# Patient Record
Sex: FEMALE | Race: WHITE | NOT HISPANIC OR LATINO | Employment: OTHER | URBAN - NONMETROPOLITAN AREA
[De-identification: names, ages, dates, MRNs, and addresses within clinical notes are randomized per-mention and may not be internally consistent; named-entity substitution may affect disease eponyms.]

---

## 2017-04-12 ENCOUNTER — TELEPHONE (OUTPATIENT)
Dept: FAMILY MEDICINE CLINIC | Facility: CLINIC | Age: 57
End: 2017-04-12

## 2017-04-12 RX ORDER — TRAZODONE HYDROCHLORIDE 50 MG/1
50-100 TABLET ORAL NIGHTLY
Qty: 60 TABLET | Refills: 2 | Status: SHIPPED | OUTPATIENT
Start: 2017-04-12 | End: 2017-04-13 | Stop reason: SDUPTHER

## 2017-04-12 NOTE — TELEPHONE ENCOUNTER
Requested Refills Sent    ----- Message from Lana Taveras sent at 4/12/2017  9:32 AM CDT -----  Regarding: REFILL  TRISTEN PT  Contact: [phone]  [facility] called and said that patient needs refill of Trazadone 50 mg #60. Phone is [phone].

## 2017-04-13 RX ORDER — TRAZODONE HYDROCHLORIDE 50 MG/1
50-100 TABLET ORAL NIGHTLY
Qty: 60 TABLET | Refills: 2 | Status: SHIPPED | OUTPATIENT
Start: 2017-04-13 | End: 2017-07-10 | Stop reason: SDUPTHER

## 2017-05-11 ENCOUNTER — OFFICE VISIT (OUTPATIENT)
Dept: FAMILY MEDICINE CLINIC | Facility: CLINIC | Age: 57
End: 2017-05-11

## 2017-05-11 VITALS
BODY MASS INDEX: 25.23 KG/M2 | HEART RATE: 90 BPM | HEIGHT: 64 IN | DIASTOLIC BLOOD PRESSURE: 78 MMHG | OXYGEN SATURATION: 99 % | SYSTOLIC BLOOD PRESSURE: 120 MMHG | WEIGHT: 147.8 LBS

## 2017-05-11 DIAGNOSIS — Z11.59 NEED FOR HEPATITIS C SCREENING TEST: ICD-10-CM

## 2017-05-11 DIAGNOSIS — K64.8 INTERNAL HEMORRHOID: Primary | ICD-10-CM

## 2017-05-11 DIAGNOSIS — Z00.00 ANNUAL PHYSICAL EXAM: Primary | ICD-10-CM

## 2017-05-11 PROCEDURE — 99212 OFFICE O/P EST SF 10 MIN: CPT | Performed by: GENERAL PRACTICE

## 2017-05-11 RX ORDER — HYDROCORTISONE ACETATE 25 MG/1
25 SUPPOSITORY RECTAL 2 TIMES DAILY
Qty: 12 SUPPOSITORY | Refills: 1 | Status: SHIPPED | OUTPATIENT
Start: 2017-05-11 | End: 2017-07-18

## 2017-07-07 ENCOUNTER — LAB (OUTPATIENT)
Dept: LAB | Facility: HOSPITAL | Age: 57
End: 2017-07-07

## 2017-07-07 DIAGNOSIS — Z00.00 ANNUAL PHYSICAL EXAM: ICD-10-CM

## 2017-07-07 DIAGNOSIS — Z11.59 NEED FOR HEPATITIS C SCREENING TEST: ICD-10-CM

## 2017-07-07 LAB
ALBUMIN SERPL-MCNC: 4.8 G/DL (ref 3.4–4.8)
ALBUMIN/GLOB SERPL: 1.6 G/DL (ref 1.1–1.8)
ALP SERPL-CCNC: 60 U/L (ref 38–126)
ALT SERPL W P-5'-P-CCNC: 45 U/L (ref 9–52)
ANION GAP SERPL CALCULATED.3IONS-SCNC: 13 MMOL/L (ref 5–15)
ARTICHOKE IGE QN: 182 MG/DL (ref 1–129)
AST SERPL-CCNC: 25 U/L (ref 14–36)
BACTERIA UR QL AUTO: ABNORMAL /HPF
BILIRUB SERPL-MCNC: 0.7 MG/DL (ref 0.2–1.3)
BILIRUB UR QL STRIP: NEGATIVE
BUN BLD-MCNC: 20 MG/DL (ref 7–21)
BUN/CREAT SERPL: 22.2 (ref 7–25)
CALCIUM SPEC-SCNC: 9.4 MG/DL (ref 8.4–10.2)
CHLORIDE SERPL-SCNC: 104 MMOL/L (ref 95–110)
CHOLEST SERPL-MCNC: 256 MG/DL (ref 0–199)
CLARITY UR: CLEAR
CO2 SERPL-SCNC: 25 MMOL/L (ref 22–31)
COLOR UR: YELLOW
CREAT BLD-MCNC: 0.9 MG/DL (ref 0.5–1)
GFR SERPL CREATININE-BSD FRML MDRD: 65 ML/MIN/1.73 (ref 51–120)
GLOBULIN UR ELPH-MCNC: 3 GM/DL (ref 2.3–3.5)
GLUCOSE BLD-MCNC: 106 MG/DL (ref 60–100)
GLUCOSE UR STRIP-MCNC: NEGATIVE MG/DL
HCV AB SER DONR QL: NEGATIVE
HDLC SERPL-MCNC: 51 MG/DL (ref 60–200)
HGB UR QL STRIP.AUTO: ABNORMAL
HYALINE CASTS UR QL AUTO: ABNORMAL /LPF
KETONES UR QL STRIP: NEGATIVE
LDLC/HDLC SERPL: 3.61 {RATIO} (ref 0–3.22)
LEUKOCYTE ESTERASE UR QL STRIP.AUTO: NEGATIVE
NITRITE UR QL STRIP: NEGATIVE
PH UR STRIP.AUTO: 5.5 [PH] (ref 5–9)
POTASSIUM BLD-SCNC: 4.5 MMOL/L (ref 3.5–5.1)
PROT SERPL-MCNC: 7.8 G/DL (ref 6.3–8.6)
PROT UR QL STRIP: NEGATIVE
RBC # UR: ABNORMAL /HPF
REF LAB TEST METHOD: ABNORMAL
SODIUM BLD-SCNC: 142 MMOL/L (ref 137–145)
SP GR UR STRIP: 1.02 (ref 1–1.03)
SQUAMOUS #/AREA URNS HPF: ABNORMAL /HPF
TRIGL SERPL-MCNC: 104 MG/DL (ref 20–199)
UROBILINOGEN UR QL STRIP: ABNORMAL
WBC UR QL AUTO: ABNORMAL /HPF

## 2017-07-07 PROCEDURE — 80061 LIPID PANEL: CPT

## 2017-07-07 PROCEDURE — 81001 URINALYSIS AUTO W/SCOPE: CPT

## 2017-07-07 PROCEDURE — 80053 COMPREHEN METABOLIC PANEL: CPT

## 2017-07-07 PROCEDURE — 36415 COLL VENOUS BLD VENIPUNCTURE: CPT

## 2017-07-07 PROCEDURE — 86803 HEPATITIS C AB TEST: CPT

## 2017-07-10 ENCOUNTER — APPOINTMENT (OUTPATIENT)
Dept: LAB | Facility: HOSPITAL | Age: 57
End: 2017-07-10

## 2017-07-10 ENCOUNTER — OFFICE VISIT (OUTPATIENT)
Dept: FAMILY MEDICINE CLINIC | Facility: CLINIC | Age: 57
End: 2017-07-10

## 2017-07-10 ENCOUNTER — APPOINTMENT (OUTPATIENT)
Dept: MAMMOGRAPHY | Facility: CLINIC | Age: 57
End: 2017-07-10

## 2017-07-10 VITALS
WEIGHT: 149.6 LBS | OXYGEN SATURATION: 99 % | HEART RATE: 76 BPM | SYSTOLIC BLOOD PRESSURE: 118 MMHG | DIASTOLIC BLOOD PRESSURE: 60 MMHG | HEIGHT: 64 IN | BODY MASS INDEX: 25.54 KG/M2

## 2017-07-10 DIAGNOSIS — E78.2 MIXED HYPERLIPIDEMIA: ICD-10-CM

## 2017-07-10 DIAGNOSIS — N89.8 VAGINAL ITCHING: ICD-10-CM

## 2017-07-10 DIAGNOSIS — R31.9 HEMATURIA: ICD-10-CM

## 2017-07-10 DIAGNOSIS — Z12.31 ENCOUNTER FOR SCREENING MAMMOGRAM FOR MALIGNANT NEOPLASM OF BREAST: Primary | ICD-10-CM

## 2017-07-10 DIAGNOSIS — Z00.00 ANNUAL PHYSICAL EXAM: Primary | ICD-10-CM

## 2017-07-10 LAB
CANDIDA ALBICANS: NEGATIVE
GARDNERELLA VAGINALIS: POSITIVE
TRICHOMONAS VAGINALIS PCR: NEGATIVE

## 2017-07-10 PROCEDURE — 99396 PREV VISIT EST AGE 40-64: CPT | Performed by: GENERAL PRACTICE

## 2017-07-10 PROCEDURE — 77063 BREAST TOMOSYNTHESIS BI: CPT | Performed by: GENERAL PRACTICE

## 2017-07-10 PROCEDURE — 77067 SCR MAMMO BI INCL CAD: CPT | Performed by: GENERAL PRACTICE

## 2017-07-10 PROCEDURE — 87660 TRICHOMONAS VAGIN DIR PROBE: CPT | Performed by: GENERAL PRACTICE

## 2017-07-10 PROCEDURE — 87510 GARDNER VAG DNA DIR PROBE: CPT | Performed by: GENERAL PRACTICE

## 2017-07-10 PROCEDURE — 87480 CANDIDA DNA DIR PROBE: CPT | Performed by: GENERAL PRACTICE

## 2017-07-10 RX ORDER — TRAZODONE HYDROCHLORIDE 50 MG/1
50-100 TABLET ORAL NIGHTLY
Qty: 180 TABLET | Refills: 3 | Status: SHIPPED | OUTPATIENT
Start: 2017-07-10 | End: 2018-08-20 | Stop reason: SDUPTHER

## 2017-07-10 RX ORDER — LOVASTATIN 20 MG/1
20 TABLET ORAL NIGHTLY
Qty: 90 TABLET | Refills: 3 | Status: SHIPPED | OUTPATIENT
Start: 2017-07-10 | End: 2018-07-20 | Stop reason: SDUPTHER

## 2017-07-10 NOTE — PROGRESS NOTES
Subjective   Belen Larios is a 56 y.o. female.     Chief Complaint   Patient presents with   • Annual Exam     labs smamo     History of Present Illness     For annual wellness exam.  Labs reviewed. Due for mammogram. Having anal and vaginal itching.     The following portions of the patient's history were reviewed and updated as appropriate: allergies, current medications, past family and social history and problem list.    Outpatient Medications Prior to Visit   Medication Sig Dispense Refill   • acetaminophen-codeine (TYLENOL #3) 300-30 MG per tablet Take 1 tablet by mouth every 4 (four) hours as needed for moderate pain (4-6). 60 tablet 0   • Calcium Carb-Cholecalciferol (CALCIUM CARBONATE-VITAMIN D3) 600-400 MG-UNIT tablet Take 600 mg by mouth daily.     • diclofenac (VOLTAREN) 50 MG EC tablet Take 1 tablet by mouth 3 (three) times a day. 90 tablet 5   • hydrocortisone (ANUSOL-HC) 25 MG suppository Insert 1 suppository into the rectum 2 (Two) Times a Day. 12 suppository 1   • traZODone (DESYREL) 50 MG tablet Take 1-2 tablets by mouth Every Night. 60 tablet 2   • ciprofloxacin (CIPRO) 500 MG tablet Take 1 tablet by mouth now. 1 tablet 0     No facility-administered medications prior to visit.      Review of Systems   Constitutional: Negative.  Negative for activity change, appetite change, chills, fatigue, fever and unexpected weight change.   HENT: Negative.  Negative for congestion, ear pain, hearing loss, nosebleeds, rhinorrhea, sinus pressure, sneezing, sore throat, tinnitus and trouble swallowing.    Eyes: Negative.  Negative for pain, discharge, redness, itching and visual disturbance.   Respiratory: Negative.  Negative for apnea, cough, chest tightness, shortness of breath and wheezing.    Cardiovascular: Negative.  Negative for chest pain, palpitations and leg swelling.   Gastrointestinal: Negative.  Negative for abdominal distention, abdominal pain, constipation, diarrhea, nausea and vomiting.  "  Endocrine: Negative.    Genitourinary: Negative.  Negative for dysuria, frequency and urgency.   Musculoskeletal: Negative.  Negative for arthralgias, back pain, gait problem, joint swelling, myalgias, neck pain and neck stiffness.   Skin: Negative.  Negative for color change and rash.   Allergic/Immunologic: Negative.    Neurological: Negative.  Negative for dizziness, weakness, light-headedness, numbness and headaches.   Hematological: Negative.  Negative for adenopathy.   Psychiatric/Behavioral: Negative.  Negative for dysphoric mood and sleep disturbance. The patient is not nervous/anxious.      Objective     Visit Vitals   • /60   • Pulse 76   • Ht 64\" (162.6 cm)   • Wt 149 lb 9.6 oz (67.9 kg)   • SpO2 99%   • BMI 25.68 kg/m2     Physical Exam   Constitutional: She is oriented to person, place, and time. She appears well-developed and well-nourished. No distress.   HENT:   Head: Normocephalic and atraumatic.   Nose: Nose normal.   Mouth/Throat: Oropharynx is clear and moist.   Eyes: Conjunctivae and EOM are normal. Pupils are equal, round, and reactive to light. Right eye exhibits no discharge. Left eye exhibits no discharge.   Neck: No tracheal deviation present. No thyromegaly present.   Cardiovascular: Normal rate, regular rhythm, normal heart sounds and intact distal pulses.    No murmur heard.  Pulmonary/Chest: Effort normal and breath sounds normal. No respiratory distress. She has no wheezes. She has no rales. She exhibits no tenderness. Right breast exhibits no inverted nipple, no mass, no nipple discharge, no skin change and no tenderness. Left breast exhibits no inverted nipple, no mass, no nipple discharge, no skin change and no tenderness.   Abdominal: Soft. Bowel sounds are normal. She exhibits no distension and no mass. There is no tenderness. No hernia. Hernia confirmed negative in the right inguinal area and confirmed negative in the left inguinal area.   Genitourinary: Vagina normal and " uterus normal. Pelvic exam was performed with patient supine. There is no rash, tenderness or lesion on the right labia. There is no rash, tenderness or lesion on the left labia. No erythema, tenderness or bleeding in the vagina. No foreign body in the vagina. No vaginal discharge found.   Musculoskeletal: Normal range of motion. She exhibits no deformity.   Lymphadenopathy:     She has no cervical adenopathy.        Right: No inguinal adenopathy present.        Left: No inguinal adenopathy present.   Neurological: She is alert and oriented to person, place, and time. She has normal reflexes.   Skin: Skin is warm and dry.   Psychiatric: She has a normal mood and affect. Her behavior is normal. Judgment and thought content normal.   Nursing note and vitals reviewed.     Lab on 07/07/2017   Component Date Value Ref Range Status   • Hepatitis C Ab 07/07/2017 Negative  Negative Final   Lab on 07/07/2017   Component Date Value Ref Range Status   • Color, UA 07/07/2017 Yellow  Yellow, Straw, Dark Yellow, Liliana Final   • Appearance, UA 07/07/2017 Clear  Clear Final   • pH, UA 07/07/2017 5.5  5.0 - 9.0 Final   • Specific Gravity, UA 07/07/2017 1.019  1.003 - 1.030 Final   • Glucose, UA 07/07/2017 Negative  Negative Final   • Ketones, UA 07/07/2017 Negative  Negative Final   • Bilirubin, UA 07/07/2017 Negative  Negative Final   • Blood, UA 07/07/2017 Trace* Negative Final   • Protein, UA 07/07/2017 Negative  Negative Final   • Leuk Esterase, UA 07/07/2017 Negative  Negative Final   • Nitrite, UA 07/07/2017 Negative  Negative Final   • Urobilinogen, UA 07/07/2017 0.2 E.U./dL  0.2 - 1.0 E.U./dL Final   • RBC, UA 07/07/2017 13-20* None Seen /HPF Final   • WBC, UA 07/07/2017 0-2  None Seen, 0-2, 3-5 /HPF Final   • Bacteria, UA 07/07/2017 None Seen  None Seen /HPF Final   • Squamous Epithelial Cells, UA 07/07/2017 3-5* None Seen, 0-2 /HPF Final   • Hyaline Casts, UA 07/07/2017 0-2  None Seen /LPF Final   • Methodology 07/07/2017  Automated Microscopy   Final   Lab on 07/07/2017   Component Date Value Ref Range Status   • Total Cholesterol 07/07/2017 256* 0 - 199 mg/dL Final   • Triglycerides 07/07/2017 104  20 - 199 mg/dL Final   • HDL Cholesterol 07/07/2017 51* 60 - 200 mg/dL Final   • LDL Cholesterol  07/07/2017 182* 1 - 129 mg/dL Final   • LDL/HDL Ratio 07/07/2017 3.61* 0.00 - 3.22 Final   Lab on 07/07/2017   Component Date Value Ref Range Status   • Glucose 07/07/2017 106* 60 - 100 mg/dL Final   • BUN 07/07/2017 20  7 - 21 mg/dL Final   • Creatinine 07/07/2017 0.90  0.50 - 1.00 mg/dL Final   • Sodium 07/07/2017 142  137 - 145 mmol/L Final   • Potassium 07/07/2017 4.5  3.5 - 5.1 mmol/L Final   • Chloride 07/07/2017 104  95 - 110 mmol/L Final   • CO2 07/07/2017 25.0  22.0 - 31.0 mmol/L Final   • Calcium 07/07/2017 9.4  8.4 - 10.2 mg/dL Final   • Total Protein 07/07/2017 7.8  6.3 - 8.6 g/dL Final   • Albumin 07/07/2017 4.80  3.40 - 4.80 g/dL Final   • ALT (SGPT) 07/07/2017 45  9 - 52 U/L Final   • AST (SGOT) 07/07/2017 25  14 - 36 U/L Final   • Alkaline Phosphatase 07/07/2017 60  38 - 126 U/L Final   • Total Bilirubin 07/07/2017 0.7  0.2 - 1.3 mg/dL Final   • eGFR Non  Amer 07/07/2017 65  51 - 120 mL/min/1.73 Final   • Globulin 07/07/2017 3.0  2.3 - 3.5 gm/dL Final   • A/G Ratio 07/07/2017 1.6  1.1 - 1.8 g/dL Final   • BUN/Creatinine Ratio 07/07/2017 22.2  7.0 - 25.0 Final   • Anion Gap 07/07/2017 13.0  5.0 - 15.0 mmol/L Final      Assessment/Plan   Problem List Items Addressed This Visit     None      Visit Diagnoses     Annual physical exam    -  Primary    Relevant Orders    Comprehensive Metabolic Panel    Lipid Panel    Urinalysis With Microscopic    Vaginal itching        Relevant Orders    Vaginitis / Vaginosis DNA Probe (Completed)    Mixed hyperlipidemia        Relevant Medications    lovastatin (MEVACOR) 20 MG tablet    Other Relevant Orders    Comprehensive Metabolic Panel    Lipid Panel    Hematuria        Relevant Orders     Urinalysis With Microscopic        Will notify regarding results. Start lovastatin.     New Medications Ordered This Visit   Medications   • lovastatin (MEVACOR) 20 MG tablet     Sig: Take 1 tablet by mouth Every Night.     Dispense:  90 tablet     Refill:  3   • traZODone (DESYREL) 50 MG tablet     Sig: Take 1-2 tablets by mouth Every Night.     Dispense:  180 tablet     Refill:  3     Return in about 1 year (around 7/10/2018) for Annual physical.

## 2017-07-12 RX ORDER — CLINDAMYCIN PHOSPHATE 20 MG/G
1 CREAM VAGINAL NIGHTLY
Qty: 40 G | Refills: 0 | Status: SHIPPED | OUTPATIENT
Start: 2017-07-12 | End: 2017-07-18

## 2017-08-28 ENCOUNTER — LAB (OUTPATIENT)
Dept: LAB | Facility: HOSPITAL | Age: 57
End: 2017-08-28

## 2017-08-28 DIAGNOSIS — Z00.00 ANNUAL PHYSICAL EXAM: ICD-10-CM

## 2017-08-28 LAB
BACTERIA UR QL AUTO: ABNORMAL /HPF
BILIRUB UR QL STRIP: NEGATIVE
CLARITY UR: CLEAR
COLOR UR: YELLOW
GLUCOSE UR STRIP-MCNC: NEGATIVE MG/DL
HGB UR QL STRIP.AUTO: ABNORMAL
HYALINE CASTS UR QL AUTO: ABNORMAL /LPF
KETONES UR QL STRIP: NEGATIVE
LEUKOCYTE ESTERASE UR QL STRIP.AUTO: NEGATIVE
NITRITE UR QL STRIP: NEGATIVE
PH UR STRIP.AUTO: 5.5 [PH] (ref 5–9)
PROT UR QL STRIP: NEGATIVE
RBC # UR: ABNORMAL /HPF
REF LAB TEST METHOD: ABNORMAL
SP GR UR STRIP: 1.02 (ref 1–1.03)
SQUAMOUS #/AREA URNS HPF: ABNORMAL /HPF
UROBILINOGEN UR QL STRIP: ABNORMAL
WBC UR QL AUTO: ABNORMAL /HPF

## 2017-08-28 PROCEDURE — 81001 URINALYSIS AUTO W/SCOPE: CPT | Performed by: GENERAL PRACTICE

## 2017-12-06 ENCOUNTER — OFFICE VISIT (OUTPATIENT)
Dept: FAMILY MEDICINE CLINIC | Facility: CLINIC | Age: 57
End: 2017-12-06

## 2017-12-06 VITALS
BODY MASS INDEX: 25.61 KG/M2 | HEIGHT: 64 IN | WEIGHT: 150 LBS | SYSTOLIC BLOOD PRESSURE: 122 MMHG | DIASTOLIC BLOOD PRESSURE: 82 MMHG

## 2017-12-06 DIAGNOSIS — R20.2 NUMBNESS AND TINGLING: ICD-10-CM

## 2017-12-06 DIAGNOSIS — M50.30 DEGENERATIVE DISC DISEASE, CERVICAL: ICD-10-CM

## 2017-12-06 DIAGNOSIS — M54.2 CERVICAL PAIN (NECK): Primary | ICD-10-CM

## 2017-12-06 DIAGNOSIS — R20.0 NUMBNESS AND TINGLING: ICD-10-CM

## 2017-12-06 PROCEDURE — 99213 OFFICE O/P EST LOW 20 MIN: CPT | Performed by: NURSE PRACTITIONER

## 2017-12-06 RX ORDER — MELOXICAM 7.5 MG/1
TABLET ORAL
Qty: 60 TABLET | Refills: 11 | Status: SHIPPED | OUTPATIENT
Start: 2017-12-06 | End: 2018-08-20

## 2017-12-06 RX ORDER — HYDROCORTISONE ACETATE 25 MG/1
25 SUPPOSITORY RECTAL 2 TIMES DAILY
Qty: 20 SUPPOSITORY | Refills: 5 | Status: SHIPPED | OUTPATIENT
Start: 2017-12-06 | End: 2018-08-20

## 2017-12-06 RX ORDER — BACLOFEN 20 MG/1
20 TABLET ORAL 3 TIMES DAILY
Qty: 90 TABLET | Refills: 11 | Status: SHIPPED | OUTPATIENT
Start: 2017-12-06 | End: 2018-08-20

## 2017-12-06 RX ORDER — ACETAMINOPHEN AND CODEINE PHOSPHATE 300; 30 MG/1; MG/1
1 TABLET ORAL EVERY 4 HOURS PRN
Qty: 90 TABLET | Refills: 0 | Status: SHIPPED | OUTPATIENT
Start: 2017-12-06 | End: 2018-10-30

## 2017-12-06 NOTE — PROGRESS NOTES
Chief Complaint   Patient presents with   • Neck Pain     does she need to stay on the same meds   • Hemorrhoids     flare up     Subjective   Belen Larios is a 57 y.o. female.     Neck Pain    This is a recurrent problem. The current episode started more than 1 year ago. The problem occurs 2 to 4 times per day. The problem has been gradually worsening. The pain is associated with lifting a heavy object. The pain is present in the anterior neck and left side. The quality of the pain is described as cramping, shooting and stabbing. The pain is at a severity of 10/10. The pain is severe. The symptoms are aggravated by twisting. The pain is same all the time. Stiffness is present all day. Associated symptoms include headaches and numbness. Pertinent negatives include no chest pain, fever, leg pain, pain with swallowing, paresis, photophobia, syncope, tingling, trouble swallowing, visual change or weakness. She has tried acetaminophen, NSAIDs, oral narcotics, muscle relaxants, neck support, ice, chiropractic manipulation, heat and home exercises (also seen neurosurgeon in [place] and they did not suggest surgery ) for the symptoms. The treatment provided mild relief.   Hemorrhoids   Associated symptoms include arthralgias, fatigue, headaches, joint swelling, myalgias, neck pain and numbness. Pertinent negatives include no chest pain, chills, congestion, fever, nausea, rash, sore throat, visual change or weakness.        The following portions of the patient's history were reviewed and updated as appropriate: allergies, current medications, past social history and problem list.    Review of Systems   Constitutional: Positive for fatigue. Negative for activity change, appetite change, chills and fever.   HENT: Negative for congestion, sore throat and trouble swallowing.    Eyes: Negative.  Negative for photophobia.   Respiratory: Negative.    Cardiovascular: Negative for chest pain and syncope.  "  Gastrointestinal: Positive for hemorrhoids. Negative for nausea.   Endocrine: Negative.    Genitourinary: Negative.    Musculoskeletal: Positive for arthralgias, back pain, joint swelling, myalgias, neck pain and neck stiffness. Negative for gait problem.   Skin: Negative.  Negative for rash.   Allergic/Immunologic: Negative.    Neurological: Positive for numbness and headaches. Negative for dizziness, tingling, tremors, seizures, syncope, facial asymmetry, speech difficulty, weakness and light-headedness.   Hematological: Negative.    Psychiatric/Behavioral: Negative.        Objective   /82  Ht 162.6 cm (64\")  Wt 68 kg (150 lb)  BMI 25.75 kg/m2  Physical Exam   Constitutional: She is oriented to person, place, and time. She appears well-developed and well-nourished. No distress.   HENT:   Head: Normocephalic and atraumatic.   Mouth/Throat: No oropharyngeal exudate.   Eyes: EOM are normal. Pupils are equal, round, and reactive to light. Right eye exhibits no discharge. Left eye exhibits no discharge. No scleral icterus.   Neck: Normal range of motion. Neck supple. No JVD present. No tracheal deviation present. No thyromegaly present.   Cardiovascular: Normal rate, regular rhythm, normal heart sounds and normal pulses.    Pulmonary/Chest: Effort normal and breath sounds normal. No stridor. No respiratory distress. She has no wheezes. She has no rales. She exhibits no tenderness.   Abdominal: Soft. Bowel sounds are normal. She exhibits no distension and no mass. There is no tenderness. There is no rebound and no guarding. No hernia.   Genitourinary: Rectum normal.   Musculoskeletal: She exhibits tenderness.        Right shoulder: She exhibits tenderness, pain and spasm. She exhibits normal range of motion, no bony tenderness, no swelling, no effusion, no crepitus, no deformity, no laceration, normal pulse and normal strength.        Left shoulder: She exhibits tenderness, bony tenderness, crepitus, pain, " spasm and decreased strength. She exhibits normal range of motion, no swelling, no effusion, no deformity, no laceration and normal pulse.        Cervical back: She exhibits decreased range of motion, tenderness, bony tenderness, swelling, edema, pain and spasm. She exhibits no deformity, no laceration and normal pulse.        Arms:  Lymphadenopathy:     She has no cervical adenopathy.   Neurological: She is alert and oriented to person, place, and time. She has normal reflexes. She displays normal reflexes. No cranial nerve deficit. She exhibits normal muscle tone. Coordination normal.   Skin: Skin is warm and dry. No rash noted. She is not diaphoretic. No erythema. No pallor.   Psychiatric: She has a normal mood and affect.   Nursing note and vitals reviewed.    MRI Cervical Spine Without Contrast [[id number]] (Order [id number])   Status: Final result   Appointment Information   PACS Images   Radiology Images   Study Result   Patient Name-  GERMAN GUSTAFSON  Patient ID-  [id number]   Ordering-  NAYELY MAGALLON NP  Attending-  YESENIA ABBOTT MD  Referring-  NAYELY MAGALLON NP  ------------------------------------------------  Neck pain and left shoulder pain.      MR, cervical spine without intravenous contrast.      Comparison is made with MRI exam dated August 30, 2010.      Examination revealed anterior fusion of C6 and C7. The alignment of  the cervical spine is well maintained. No subluxation is seen. The  disc spaces are within normal limits. No bone marrow replacement is  identified. The predental space is unremarkable. The craniocervical  junction is normal in appearance.      At C2-C3 there is mild disc bulge/osteophyte complex without cord  effacement.      At C3-C4 no significant abnormality is seen.      At C4-C5 there is facet arthropathy on the right.      At C5-C6 there is disc bulge/osteophyte complex which touches the  cord. There are mild bilateral uncovertebral osteophytes.      At C6-C7  there is mild posterior osteophytic ridging which touches the  right hemicord.      At C7-T1 there is disc bulge/osteophyte complex which touches the  cord.      No abnormal signal is seen within the spinal cord.      CONCLUSION- Degenerative changes as described above.      Electronically Signed By- Kory Gold MD On: 2016 16:24:36   Imaging   MRI Cervical Spine Without Contrast (Order #[id number]) on 2016 - Imaging Information   Result Notes   Notes Recorded by SHARYN Perkins on       Assessment/Plan   Problem List Items Addressed This Visit        Nervous and Auditory    Cervical pain (neck) - Primary    Relevant Orders    Comprehensive Metabolic Panel    Numbness and tingling    Relevant Orders    Comprehensive Metabolic Panel       Musculoskeletal and Integument    Degenerative disc disease, cervical           New Medications Ordered This Visit   Medications   • acetaminophen-codeine (TYLENOL #3) 300-30 MG per tablet     Sig: Take 1 tablet by mouth Every 4 (Four) Hours As Needed for Moderate Pain .     Dispense:  90 tablet     Refill:  0   • meloxicam (MOBIC) 7.5 MG tablet     Si-2 tab daily     Dispense:  60 tablet     Refill:  11   • baclofen (LIORESAL) 20 MG tablet     Sig: Take 1 tablet by mouth 3 (Three) Times a Day.     Dispense:  90 tablet     Refill:  11   • hydrocortisone (ANUSOL-HC) 25 MG suppository     Sig: Insert 1 suppository into the rectum 2 (Two) Times a Day.     Dispense:  20 suppository     Refill:  5      continue chiropractor as directed once monthly, meds sparingly, monitor gi upset diet discussed, ice to area, follow up if worsen    Patient understands the risks associated with this controlled medication, including tolerance and addiction.  she also agrees to only obtain this medication from me, and not from a another provider, unless that provider is covering for me in my absence.  she also agrees to be compliant in dosing, and not self adjust the dose of  medication.  A signed controlled substance agreement is on file, and she has received a controlled substance education sheet at this a previous visit.  she has also signed a consent for treatment with a controlled substance as per [facility] policy. SHABNAM was obtained.

## 2018-05-14 RX ORDER — TRAZODONE HYDROCHLORIDE 50 MG/1
TABLET ORAL
Qty: 60 TABLET | Refills: 5 | Status: SHIPPED | OUTPATIENT
Start: 2018-05-14 | End: 2018-10-18

## 2018-07-20 RX ORDER — LOVASTATIN 20 MG/1
20 TABLET ORAL NIGHTLY
Qty: 90 TABLET | Refills: 3 | Status: SHIPPED | OUTPATIENT
Start: 2018-07-20 | End: 2019-08-07 | Stop reason: SDUPTHER

## 2018-08-14 ENCOUNTER — LAB (OUTPATIENT)
Dept: LAB | Facility: HOSPITAL | Age: 58
End: 2018-08-14

## 2018-08-14 DIAGNOSIS — E78.2 MIXED HYPERLIPIDEMIA: ICD-10-CM

## 2018-08-14 DIAGNOSIS — Z00.00 ANNUAL PHYSICAL EXAM: ICD-10-CM

## 2018-08-14 LAB
ALBUMIN SERPL-MCNC: 4.9 G/DL (ref 3.4–4.8)
ALBUMIN/GLOB SERPL: 1.7 G/DL (ref 1.1–1.8)
ALP SERPL-CCNC: 49 U/L (ref 38–126)
ALT SERPL W P-5'-P-CCNC: 31 U/L (ref 9–52)
ANION GAP SERPL CALCULATED.3IONS-SCNC: 10 MMOL/L (ref 5–15)
ARTICHOKE IGE QN: 95 MG/DL (ref 1–129)
AST SERPL-CCNC: 30 U/L (ref 14–36)
BACTERIA UR QL AUTO: ABNORMAL /HPF
BILIRUB SERPL-MCNC: 0.7 MG/DL (ref 0.2–1.3)
BILIRUB UR QL STRIP: NEGATIVE
BUN BLD-MCNC: 19 MG/DL (ref 7–21)
BUN/CREAT SERPL: 25.3 (ref 7–25)
CALCIUM SPEC-SCNC: 9.4 MG/DL (ref 8.4–10.2)
CHLORIDE SERPL-SCNC: 104 MMOL/L (ref 95–110)
CHOLEST SERPL-MCNC: 181 MG/DL (ref 0–199)
CLARITY UR: CLEAR
CO2 SERPL-SCNC: 27 MMOL/L (ref 22–31)
COLOR UR: YELLOW
CREAT BLD-MCNC: 0.75 MG/DL (ref 0.5–1)
GFR SERPL CREATININE-BSD FRML MDRD: 80 ML/MIN/1.73 (ref 51–120)
GLOBULIN UR ELPH-MCNC: 2.9 GM/DL (ref 2.3–3.5)
GLUCOSE BLD-MCNC: 119 MG/DL (ref 60–100)
GLUCOSE UR STRIP-MCNC: NEGATIVE MG/DL
HDLC SERPL-MCNC: 47 MG/DL (ref 60–200)
HGB UR QL STRIP.AUTO: ABNORMAL
HYALINE CASTS UR QL AUTO: ABNORMAL /LPF
KETONES UR QL STRIP: ABNORMAL
LDLC/HDLC SERPL: 2.27 {RATIO} (ref 0–3.22)
LEUKOCYTE ESTERASE UR QL STRIP.AUTO: ABNORMAL
NITRITE UR QL STRIP: NEGATIVE
PH UR STRIP.AUTO: 5.5 [PH] (ref 5–9)
POTASSIUM BLD-SCNC: 4.3 MMOL/L (ref 3.5–5.1)
PROT SERPL-MCNC: 7.8 G/DL (ref 6.3–8.6)
PROT UR QL STRIP: NEGATIVE
RBC # UR: ABNORMAL /HPF
REF LAB TEST METHOD: ABNORMAL
SODIUM BLD-SCNC: 141 MMOL/L (ref 137–145)
SP GR UR STRIP: 1.02 (ref 1–1.03)
SQUAMOUS #/AREA URNS HPF: ABNORMAL /HPF
TRIGL SERPL-MCNC: 137 MG/DL (ref 20–199)
UROBILINOGEN UR QL STRIP: ABNORMAL
WBC UR QL AUTO: ABNORMAL /HPF

## 2018-08-14 PROCEDURE — 80053 COMPREHEN METABOLIC PANEL: CPT

## 2018-08-14 PROCEDURE — 36415 COLL VENOUS BLD VENIPUNCTURE: CPT

## 2018-08-14 PROCEDURE — 80061 LIPID PANEL: CPT

## 2018-08-14 PROCEDURE — 81001 URINALYSIS AUTO W/SCOPE: CPT

## 2018-08-20 ENCOUNTER — OFFICE VISIT (OUTPATIENT)
Dept: FAMILY MEDICINE CLINIC | Facility: CLINIC | Age: 58
End: 2018-08-20

## 2018-08-20 VITALS
WEIGHT: 146.4 LBS | HEIGHT: 64 IN | OXYGEN SATURATION: 98 % | DIASTOLIC BLOOD PRESSURE: 84 MMHG | SYSTOLIC BLOOD PRESSURE: 130 MMHG | HEART RATE: 89 BPM | BODY MASS INDEX: 25 KG/M2

## 2018-08-20 DIAGNOSIS — Z01.419 ENCOUNTER FOR GYNECOLOGICAL EXAMINATION WITHOUT ABNORMAL FINDING: ICD-10-CM

## 2018-08-20 DIAGNOSIS — Z78.0 POST-MENOPAUSAL: Primary | ICD-10-CM

## 2018-08-20 DIAGNOSIS — Z12.31 ENCOUNTER FOR SCREENING MAMMOGRAM FOR MALIGNANT NEOPLASM OF BREAST: ICD-10-CM

## 2018-08-20 DIAGNOSIS — Z00.00 ANNUAL PHYSICAL EXAM: ICD-10-CM

## 2018-08-20 DIAGNOSIS — R73.9 HYPERGLYCEMIA: Primary | ICD-10-CM

## 2018-08-20 PROCEDURE — 99396 PREV VISIT EST AGE 40-64: CPT | Performed by: GENERAL PRACTICE

## 2018-08-20 PROCEDURE — G0123 SCREEN CERV/VAG THIN LAYER: HCPCS | Performed by: GENERAL PRACTICE

## 2018-08-20 NOTE — PROGRESS NOTES
Subjective   Belen Larios is a 57 y.o. female.     Chief Complaint   Patient presents with   • Annual Exam     labs desmond kirkpatrick       History of Present Illness     For annual wellness exam.  Labs reviewed. Due for pap, mammogram and bone density.     The following portions of the patient's history were reviewed and updated as appropriate: allergies, current medications, past family and social history and problem list.    Outpatient Medications Prior to Visit   Medication Sig Dispense Refill   • acetaminophen-codeine (TYLENOL #3) 300-30 MG per tablet Take 1 tablet by mouth Every 4 (Four) Hours As Needed for Moderate Pain . 90 tablet 0   • Calcium Carb-Cholecalciferol (CALCIUM CARBONATE-VITAMIN D3) 600-400 MG-UNIT tablet Take 600 mg by mouth daily.     • lovastatin (MEVACOR) 20 MG tablet TAKE 1 TABLET BY MOUTH EVERY NIGHT. 90 tablet 3   • traZODone (DESYREL) 50 MG tablet TAKE 1 TO 2 TABLETS BY MOUTH EVERY NIGHT. 60 tablet 5   • baclofen (LIORESAL) 20 MG tablet Take 1 tablet by mouth 3 (Three) Times a Day. 90 tablet 11   • hydrocortisone (ANUSOL-HC) 25 MG suppository Insert 1 suppository into the rectum 2 (Two) Times a Day. 20 suppository 5   • meloxicam (MOBIC) 7.5 MG tablet 1-2 tab daily 60 tablet 11   • traZODone (DESYREL) 50 MG tablet Take 1-2 tablets by mouth Every Night. 180 tablet 3     No facility-administered medications prior to visit.        Review of Systems   Constitutional: Negative.  Negative for chills, fatigue, fever and unexpected weight change.   HENT: Negative.  Negative for congestion, ear pain, hearing loss, nosebleeds, rhinorrhea, sneezing, sore throat and tinnitus.    Eyes: Negative.  Negative for discharge.   Respiratory: Negative.  Negative for cough, shortness of breath and wheezing.    Cardiovascular: Negative.  Negative for chest pain and palpitations.   Gastrointestinal: Negative.  Negative for abdominal pain, constipation, diarrhea, nausea and vomiting.   Endocrine: Negative.   "  Genitourinary: Negative.  Negative for dysuria, frequency and urgency.   Musculoskeletal: Negative.  Negative for arthralgias, back pain, joint swelling, myalgias and neck pain.   Skin: Negative.  Negative for rash.   Allergic/Immunologic: Negative.    Neurological: Negative.  Negative for dizziness, weakness, numbness and headaches.   Hematological: Negative.  Negative for adenopathy.   Psychiatric/Behavioral: Negative.  Negative for dysphoric mood and sleep disturbance. The patient is not nervous/anxious.        Objective     Visit Vitals  /84   Pulse 89   Ht 162.6 cm (64\")   Wt 66.4 kg (146 lb 6.4 oz)   SpO2 98%   BMI 25.13 kg/m²     Physical Exam   Constitutional: She is oriented to person, place, and time. She appears well-developed and well-nourished. No distress.   HENT:   Head: Normocephalic and atraumatic.   Nose: Nose normal.   Mouth/Throat: Oropharynx is clear and moist.   Eyes: Pupils are equal, round, and reactive to light. Conjunctivae and EOM are normal. Right eye exhibits no discharge. Left eye exhibits no discharge.   Neck: No tracheal deviation present. No thyromegaly present.   Cardiovascular: Normal rate, regular rhythm, normal heart sounds and intact distal pulses.    No murmur heard.  Pulmonary/Chest: Effort normal and breath sounds normal. No respiratory distress. She has no wheezes. She has no rales. She exhibits no tenderness. Right breast exhibits no inverted nipple, no mass, no nipple discharge, no skin change and no tenderness. Left breast exhibits no inverted nipple, no mass, no nipple discharge, no skin change and no tenderness.   Abdominal: Soft. Bowel sounds are normal. She exhibits no distension and no mass. There is no tenderness. No hernia. Hernia confirmed negative in the right inguinal area and confirmed negative in the left inguinal area.   Genitourinary: Vagina normal and uterus normal. Pelvic exam was performed with patient supine. There is no rash, tenderness or " lesion on the right labia. There is no rash, tenderness or lesion on the left labia. Uterus is not deviated, not enlarged, not fixed and not tender. Cervix exhibits no discharge and no friability. Right adnexum displays no mass, no tenderness and no fullness. Left adnexum displays no mass, no tenderness and no fullness. No erythema, tenderness or bleeding in the vagina. No foreign body in the vagina. No vaginal discharge found.   Genitourinary Comments: Pap done, mild vaginal atrophy   Musculoskeletal: Normal range of motion. She exhibits no deformity.   Lymphadenopathy:     She has no cervical adenopathy.        Right: No inguinal adenopathy present.        Left: No inguinal adenopathy present.   Neurological: She is alert and oriented to person, place, and time. She has normal reflexes.   Skin: Skin is warm and dry.   Psychiatric: She has a normal mood and affect. Her behavior is normal. Judgment and thought content normal.   Nursing note and vitals reviewed.    Results for orders placed or performed in visit on 08/14/18   Comprehensive Metabolic Panel   Result Value Ref Range    Glucose 119 (H) 60 - 100 mg/dL    BUN 19 7 - 21 mg/dL    Creatinine 0.75 0.50 - 1.00 mg/dL    Sodium 141 137 - 145 mmol/L    Potassium 4.3 3.5 - 5.1 mmol/L    Chloride 104 95 - 110 mmol/L    CO2 27.0 22.0 - 31.0 mmol/L    Calcium 9.4 8.4 - 10.2 mg/dL    Total Protein 7.8 6.3 - 8.6 g/dL    Albumin 4.90 (H) 3.40 - 4.80 g/dL    ALT (SGPT) 31 9 - 52 U/L    AST (SGOT) 30 14 - 36 U/L    Alkaline Phosphatase 49 38 - 126 U/L    Total Bilirubin 0.7 0.2 - 1.3 mg/dL    eGFR Non  Amer 80 51 - 120 mL/min/1.73    Globulin 2.9 2.3 - 3.5 gm/dL    A/G Ratio 1.7 1.1 - 1.8 g/dL    BUN/Creatinine Ratio 25.3 (H) 7.0 - 25.0    Anion Gap 10.0 5.0 - 15.0 mmol/L   Lipid Panel   Result Value Ref Range    Total Cholesterol 181 0 - 199 mg/dL    Triglycerides 137 20 - 199 mg/dL    HDL Cholesterol 47 (L) 60 - 200 mg/dL    LDL Cholesterol  95 1 - 129 mg/dL     LDL/HDL Ratio 2.27 0.00 - 3.22   Urinalysis without microscopic (no culture) - Urine, Clean Catch   Result Value Ref Range    Color, UA Yellow Yellow, Straw, Dark Yellow, Liliana    Appearance, UA Clear Clear    pH, UA 5.5 5.0 - 9.0    Specific Gravity, UA 1.021 1.003 - 1.030    Glucose, UA Negative Negative    Ketones, UA Trace (A) Negative    Bilirubin, UA Negative Negative    Blood, UA Small (1+) (A) Negative    Protein, UA Negative Negative    Leuk Esterase, UA Small (1+) (A) Negative    Nitrite, UA Negative Negative    Urobilinogen, UA 0.2 E.U./dL 0.2 - 1.0 E.U./dL   Urinalysis, Microscopic Only - Urine, Clean Catch   Result Value Ref Range    RBC, UA 3-5 (A) None Seen /HPF    WBC, UA 6-12 (A) None Seen, 0-2, 3-5 /HPF    Bacteria, UA 1+ (A) None Seen /HPF    Squamous Epithelial Cells, UA 13-20 (A) None Seen, 0-2 /HPF    Hyaline Casts, UA 3-6 None Seen /LPF    Methodology Automated Microscopy       Assessment/Plan   Problem List Items Addressed This Visit     None      Visit Diagnoses     Hyperglycemia    -  Primary    Relevant Orders    Ambulatory Referral to Nutrition Services    Hemoglobin A1c    Encounter for gynecological examination without abnormal finding        Relevant Orders    Liquid-based Pap Smear, Screening    Annual physical exam        Relevant Orders    Hemoglobin A1c    Comprehensive Metabolic Panel    Lipid Panel    Liquid-based Pap Smear, Screening    Encounter for screening mammogram for malignant neoplasm of breast              Will notify regarding results.     No orders of the defined types were placed in this encounter.    Return in about 1 year (around 8/20/2019) for Annual physical.

## 2018-08-21 ENCOUNTER — TELEPHONE (OUTPATIENT)
Dept: FAMILY MEDICINE CLINIC | Facility: CLINIC | Age: 58
End: 2018-08-21

## 2018-08-21 NOTE — PROGRESS NOTES
Per Dr. Lucio, Ms. Larios has been called with her recent DEXA Bone Density results & recommendations.  Continue her current medications and follow-up as planned or sooner if any problems.

## 2018-08-21 NOTE — TELEPHONE ENCOUNTER
Per Dr. Lucio, Ms. Larios has been called with her recent DEXA Bone Density results & recommendations.  Continue her current medications and follow-up as planned or sooner if any problems.      ----- Message from Cathleen Lucio MD sent at 8/21/2018 12:46 PM CDT -----  Call and tell bone density shows some bone loss but not osteoporosis, make sure she is taking Ca + D 600mg daily and exercising regularly

## 2018-08-24 ENCOUNTER — TELEPHONE (OUTPATIENT)
Dept: FAMILY MEDICINE CLINIC | Facility: CLINIC | Age: 58
End: 2018-08-24

## 2018-08-27 ENCOUNTER — HOSPITAL ENCOUNTER (OUTPATIENT)
Dept: NUTRITION | Facility: HOSPITAL | Age: 58
Discharge: HOME | End: 2018-08-27
Admitting: DIETITIAN, REGISTERED

## 2018-08-27 VITALS — WEIGHT: 146 LBS | BODY MASS INDEX: 24.92 KG/M2 | HEIGHT: 64 IN

## 2018-08-27 DIAGNOSIS — Z12.31 ENCOUNTER FOR SCREENING MAMMOGRAM FOR MALIGNANT NEOPLASM OF BREAST: Primary | ICD-10-CM

## 2018-08-27 LAB
LAB AP CASE REPORT: NORMAL
LAB AP GYN ADDITIONAL INFORMATION: NORMAL
PATH INTERP SPEC-IMP: NORMAL
STAT OF ADQ CVX/VAG CYTO-IMP: NORMAL

## 2018-08-27 PROCEDURE — 97802 MEDICAL NUTRITION INDIV IN: CPT | Performed by: DIETITIAN, REGISTERED

## 2018-08-27 NOTE — PROGRESS NOTES
"Adult Outpatient Nutrition  Assessment    Patient Name:  Belen Larios  YOB: 1960  MRN: [id number]    Assessment Date:  8/27/2018    Comments:  Pt said for three years she has been \"watching her carbs\" feels like she needs some education. Pt hasn't been eating any carbs much at all not sure what to eat. Would like to lose 10 lbs still.\" I feel like a whole world is open up to me\"-  \"looking at this menu, I don't think\"i can't do this'\"  Happy to learn she can eat some carbs.  Will return in 2 weeks for follow up. This RDN will follow then.          General Info     Row Name 08/27/18 1535       Today's Session    Person(s) attending today's session Patient                Anthropometrics     Row Name 08/27/18 1536          Anthropometrics    Height 162.6 cm (64.02\")     Weight 66.2 kg (146 lb)        Ideal Body Weight (IBW)    Ideal Body Weight (IBW) (kg) 55.04     % Ideal Body Weight 120.32        Body Mass Index (BMI)    BMI (kg/m2) 25.1        IBW Adjustment, Para/Tetraplegia    5% Adjustment, Para (IBW) 52.29     10% Adjustment, Para (IBW) 49.54     10% Adjustment, Tetra (IBW) 49.54     15% Adjustment, Tetra (IBW) 46.78             Nutritional Info/Activity     Row Name 08/27/18 1534       Nutritional Information    Have you had weight changes? Yes    Describe weight changes 15 lb lost eating very few carbs    What is your desired body weight? 61.7 kg (136 lb)    Have you tried to lose weight before? Yes    What is your motivation to lose weight? control blood sugar    Functional Status able to prepare meals    What is the biggest challenge you have with your diet? Knowledge       Eating Environment    Eating environment Family       Physical Activity    Are you currently involved in an activity/exercise program?  Yes    Describe physical activity couch to 5 k    How would you rank exercise as an important health lifestyle practice? 7            Home Nutrition Report     Row Name 08/27/18 " "1538          Home Nutrition Report    Typical Food/Fluid Intake 3 meals per day     Food Preferences omits carbs to control her blood sugar. is hungry all the time. wants to lose more weight             Estimated/Assessed Needs     Row Name 08/27/18 1540 08/27/18 1536       Calculation Measurements    Height  -- 162.6 cm (64.02\")       Estimated/Assessed Needs    Additional Documentation Calorie Requirements (Group)  --       Calorie Requirements    Estimated Calorie Requirement Comment 1500   for weight mgt  --            Evaluation of Prescribed Nutrient/Fluid Intake     Row Name 08/27/18 1536          Calculation Measurements    Height 162.6 cm (64.02\")             Labs/Tests/Procedures/Meds     Row Name 08/27/18 1540          Labs/Procedures/Meds    Lab Results Reviewed reviewed        Medications    Pertinent Medications Reviewed reviewed           Electronically signed by:  April Mcgee RD  08/27/18 3:42 PM   "

## 2018-10-15 DIAGNOSIS — M89.8X1 PAIN OF RIGHT CLAVICLE: Primary | ICD-10-CM

## 2018-10-16 ENCOUNTER — OFFICE VISIT (OUTPATIENT)
Dept: ORTHOPEDIC SURGERY | Facility: CLINIC | Age: 58
End: 2018-10-16

## 2018-10-16 VITALS — HEIGHT: 64 IN | BODY MASS INDEX: 24.41 KG/M2 | WEIGHT: 143 LBS

## 2018-10-16 DIAGNOSIS — E78.00 HYPERCHOLESTEROLEMIA: ICD-10-CM

## 2018-10-16 DIAGNOSIS — S22.41XA CLOSED FRACTURE OF MULTIPLE RIBS OF RIGHT SIDE, INITIAL ENCOUNTER: ICD-10-CM

## 2018-10-16 DIAGNOSIS — S42.021A CLOSED DISPLACED FRACTURE OF SHAFT OF RIGHT CLAVICLE, INITIAL ENCOUNTER: Primary | ICD-10-CM

## 2018-10-16 DIAGNOSIS — M89.8X1 PAIN OF RIGHT CLAVICLE: ICD-10-CM

## 2018-10-16 DIAGNOSIS — Z87.09 HISTORY OF PNEUMOTHORAX: ICD-10-CM

## 2018-10-16 PROCEDURE — 99204 OFFICE O/P NEW MOD 45 MIN: CPT | Performed by: ORTHOPAEDIC SURGERY

## 2018-10-16 RX ORDER — BUPIVACAINE HCL/0.9 % NACL/PF 0.1 %
2 PLASTIC BAG, INJECTION (ML) EPIDURAL ONCE
Status: CANCELLED | OUTPATIENT
Start: 2018-10-19 | End: 2018-10-19
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
## 2018-10-18 ENCOUNTER — APPOINTMENT (OUTPATIENT)
Dept: PREADMISSION TESTING | Facility: HOSPITAL | Age: 58
End: 2018-10-18

## 2018-10-18 ENCOUNTER — ANESTHESIA EVENT (OUTPATIENT)
Dept: PERIOP | Facility: HOSPITAL | Age: 58
End: 2018-10-18

## 2018-10-18 VITALS
HEIGHT: 64 IN | DIASTOLIC BLOOD PRESSURE: 88 MMHG | RESPIRATION RATE: 12 BRPM | HEART RATE: 78 BPM | OXYGEN SATURATION: 100 % | WEIGHT: 143 LBS | BODY MASS INDEX: 24.41 KG/M2 | SYSTOLIC BLOOD PRESSURE: 154 MMHG

## 2018-10-18 DIAGNOSIS — E78.00 HYPERCHOLESTEROLEMIA: ICD-10-CM

## 2018-10-18 DIAGNOSIS — S42.021A CLOSED DISPLACED FRACTURE OF SHAFT OF RIGHT CLAVICLE, INITIAL ENCOUNTER: ICD-10-CM

## 2018-10-18 DIAGNOSIS — Z87.09 HISTORY OF PNEUMOTHORAX: ICD-10-CM

## 2018-10-18 DIAGNOSIS — S22.41XA CLOSED FRACTURE OF MULTIPLE RIBS OF RIGHT SIDE, INITIAL ENCOUNTER: ICD-10-CM

## 2018-10-18 DIAGNOSIS — M89.8X1 PAIN OF RIGHT CLAVICLE: ICD-10-CM

## 2018-10-18 LAB — MRSA DNA SPEC QL NAA+PROBE: NEGATIVE

## 2018-10-18 PROCEDURE — 93005 ELECTROCARDIOGRAM TRACING: CPT

## 2018-10-18 PROCEDURE — 93010 ELECTROCARDIOGRAM REPORT: CPT | Performed by: INTERNAL MEDICINE

## 2018-10-18 PROCEDURE — 87641 MR-STAPH DNA AMP PROBE: CPT | Performed by: ORTHOPAEDIC SURGERY

## 2018-10-18 RX ORDER — SODIUM CHLORIDE, SODIUM GLUCONATE, SODIUM ACETATE, POTASSIUM CHLORIDE, AND MAGNESIUM CHLORIDE 526; 502; 368; 37; 30 MG/100ML; MG/100ML; MG/100ML; MG/100ML; MG/100ML
1000 INJECTION, SOLUTION INTRAVENOUS CONTINUOUS
Status: CANCELLED | OUTPATIENT
Start: 2018-10-19

## 2018-10-18 RX ORDER — TRAZODONE HYDROCHLORIDE 50 MG/1
50 TABLET ORAL NIGHTLY
COMMUNITY
End: 2019-08-22

## 2018-10-18 NOTE — DISCHARGE INSTRUCTIONS
[facility]  Pre-op Information and Guidelines    You will be called after 2 p.m. the day before your surgery (Friday for Monday surgery) and notified of your time for arrival and approximate surgery time.  If you have not received a call by 4P.M., please contact Same Day Surgery at [phone] of if outside [facility] call [phone].    Please Follow these Important Safety Guidelines:    • The morning of your procedure, take only the medications listed below with   A sip of water:_____________________________________________       ______________________________________________    • DO NOT eat or drink anything after 12:00 midnight the night before surgery  Specific instructions concerning drinking clear liquids will be discussed during  the pre-surgery instruction call the day before your surgery.    • If you take a blood thinner (ex. Plavix, Coumadin, aspirin), ask your doctor when to stop it before surgery  STOP DATE: _________________    • Only 2 visitors are allowed in patient rooms at a time  Your visitors will be asked to wait in the lobby until the admission process is complete with the exception of a parent with a child and patients in need of special assistance.    • YOU CANNOT DRIVE YOURSELF HOME  You must be accompanied by someone who will be responsible for driving you home after surgery and for your care at home.    • DO NOT chew gum, use breath mints, hard candy, or smoke the day of surgery  • DO NOT drink alcohol for at least 24 hours before your surgery  • DO NOT wear any jewelry and remove all body piercing before coming to the hospital  • DO NOT wear make-up to the hospital  • If you are having surgery on an extremity (arm/leg/foot) remove nail polish/artificial nails on the surgical side  • Clothing, glasses, contacts, dentures, and hairpieces must be removed before surgery  • Bathe the night before or the morning of your surgery and do not use powders/lotions on  skin.

## 2018-10-19 ENCOUNTER — HOSPITAL ENCOUNTER (OUTPATIENT)
Facility: HOSPITAL | Age: 58
Setting detail: HOSPITAL OUTPATIENT SURGERY
Discharge: HOME | End: 2018-10-19
Attending: ORTHOPAEDIC SURGERY | Admitting: ORTHOPAEDIC SURGERY

## 2018-10-19 ENCOUNTER — ANESTHESIA (OUTPATIENT)
Dept: PERIOP | Facility: HOSPITAL | Age: 58
End: 2018-10-19

## 2018-10-19 ENCOUNTER — APPOINTMENT (OUTPATIENT)
Dept: GENERAL RADIOLOGY | Facility: HOSPITAL | Age: 58
End: 2018-10-19

## 2018-10-19 VITALS
DIASTOLIC BLOOD PRESSURE: 82 MMHG | RESPIRATION RATE: 20 BRPM | TEMPERATURE: 98.3 F | BODY MASS INDEX: 24.28 KG/M2 | HEART RATE: 82 BPM | HEIGHT: 64 IN | SYSTOLIC BLOOD PRESSURE: 169 MMHG | WEIGHT: 142.2 LBS | OXYGEN SATURATION: 98 %

## 2018-10-19 DIAGNOSIS — M89.8X1 PAIN OF RIGHT CLAVICLE: ICD-10-CM

## 2018-10-19 DIAGNOSIS — S42.021A CLOSED DISPLACED FRACTURE OF SHAFT OF RIGHT CLAVICLE, INITIAL ENCOUNTER: ICD-10-CM

## 2018-10-19 DIAGNOSIS — E78.00 HYPERCHOLESTEROLEMIA: ICD-10-CM

## 2018-10-19 DIAGNOSIS — Z87.09 HISTORY OF PNEUMOTHORAX: ICD-10-CM

## 2018-10-19 DIAGNOSIS — S22.41XA CLOSED FRACTURE OF MULTIPLE RIBS OF RIGHT SIDE, INITIAL ENCOUNTER: ICD-10-CM

## 2018-10-19 PROCEDURE — C1713 ANCHOR/SCREW BN/BN,TIS/BN: HCPCS | Performed by: ORTHOPAEDIC SURGERY

## 2018-10-19 PROCEDURE — 76000 FLUOROSCOPY <1 HR PHYS/QHP: CPT

## 2018-10-19 PROCEDURE — 73000 X-RAY EXAM OF COLLAR BONE: CPT | Performed by: ORTHOPAEDIC SURGERY

## 2018-10-19 PROCEDURE — 25010000002 DEXAMETHASONE PER 1 MG: Performed by: NURSE ANESTHETIST, CERTIFIED REGISTERED

## 2018-10-19 PROCEDURE — 25010000002 FENTANYL CITRATE (PF) 250 MCG/5ML SOLUTION: Performed by: NURSE ANESTHETIST, CERTIFIED REGISTERED

## 2018-10-19 PROCEDURE — 25010000002 PHENYLEPHRINE PER 1 ML: Performed by: NURSE ANESTHETIST, CERTIFIED REGISTERED

## 2018-10-19 PROCEDURE — 23485 REVISION OF COLLAR BONE: CPT | Performed by: ORTHOPAEDIC SURGERY

## 2018-10-19 PROCEDURE — 25010000002 PROPOFOL 10 MG/ML EMULSION: Performed by: NURSE ANESTHETIST, CERTIFIED REGISTERED

## 2018-10-19 PROCEDURE — 25010000002 HYDROMORPHONE 1 MG/ML SOLUTION: Performed by: NURSE ANESTHETIST, CERTIFIED REGISTERED

## 2018-10-19 DEVICE — 3.5MM X 12.0MM LOCKING CORTICAL SCREW
Type: IMPLANTABLE DEVICE | Status: FUNCTIONAL
Brand: ACUMED

## 2018-10-19 DEVICE — 3.5MM X 14.0MM LOCKING CORTICAL SCREW
Type: IMPLANTABLE DEVICE | Status: FUNCTIONAL
Brand: ACUMED

## 2018-10-19 DEVICE — 3.5MM X 16.0MM CORTICAL SCREW
Type: IMPLANTABLE DEVICE | Status: FUNCTIONAL
Brand: ACUMED

## 2018-10-19 DEVICE — LOW PROF CLAVICLE PLATE, 8-HOLE STR RT
Type: IMPLANTABLE DEVICE | Status: FUNCTIONAL
Brand: ACUMED

## 2018-10-19 DEVICE — 3.5MM X 18.0MM CORTICAL SCREW
Type: IMPLANTABLE DEVICE | Status: FUNCTIONAL
Brand: ACUMED

## 2018-10-19 RX ORDER — LIDOCAINE HYDROCHLORIDE 20 MG/ML
INJECTION, SOLUTION INFILTRATION; PERINEURAL AS NEEDED
Status: DISCONTINUED | OUTPATIENT
Start: 2018-10-19 | End: 2018-10-19 | Stop reason: SURG

## 2018-10-19 RX ORDER — DEXAMETHASONE SODIUM PHOSPHATE 4 MG/ML
INJECTION, SOLUTION INTRA-ARTICULAR; INTRALESIONAL; INTRAMUSCULAR; INTRAVENOUS; SOFT TISSUE AS NEEDED
Status: DISCONTINUED | OUTPATIENT
Start: 2018-10-19 | End: 2018-10-19 | Stop reason: SURG

## 2018-10-19 RX ORDER — PROMETHAZINE HYDROCHLORIDE 25 MG/ML
12.5 INJECTION, SOLUTION INTRAMUSCULAR; INTRAVENOUS ONCE AS NEEDED
Status: DISCONTINUED | OUTPATIENT
Start: 2018-10-19 | End: 2018-10-19 | Stop reason: HOSPADM

## 2018-10-19 RX ORDER — SODIUM CHLORIDE, SODIUM GLUCONATE, SODIUM ACETATE, POTASSIUM CHLORIDE, AND MAGNESIUM CHLORIDE 526; 502; 368; 37; 30 MG/100ML; MG/100ML; MG/100ML; MG/100ML; MG/100ML
1000 INJECTION, SOLUTION INTRAVENOUS CONTINUOUS
Status: DISCONTINUED | OUTPATIENT
Start: 2018-10-19 | End: 2018-10-19 | Stop reason: HOSPADM

## 2018-10-19 RX ORDER — OXYCODONE HYDROCHLORIDE AND ACETAMINOPHEN 5; 325 MG/1; MG/1
1 TABLET ORAL ONCE AS NEEDED
Status: DISCONTINUED | OUTPATIENT
Start: 2018-10-19 | End: 2018-10-19 | Stop reason: HOSPADM

## 2018-10-19 RX ORDER — BUPIVACAINE HYDROCHLORIDE AND EPINEPHRINE 5; 5 MG/ML; UG/ML
INJECTION, SOLUTION EPIDURAL; INTRACAUDAL; PERINEURAL AS NEEDED
Status: DISCONTINUED | OUTPATIENT
Start: 2018-10-19 | End: 2018-10-19 | Stop reason: HOSPADM

## 2018-10-19 RX ORDER — ACETAMINOPHEN 650 MG/1
650 SUPPOSITORY RECTAL ONCE AS NEEDED
Status: DISCONTINUED | OUTPATIENT
Start: 2018-10-19 | End: 2018-10-19 | Stop reason: HOSPADM

## 2018-10-19 RX ORDER — ROCURONIUM BROMIDE 10 MG/ML
INJECTION, SOLUTION INTRAVENOUS AS NEEDED
Status: DISCONTINUED | OUTPATIENT
Start: 2018-10-19 | End: 2018-10-19 | Stop reason: SURG

## 2018-10-19 RX ORDER — DIPHENHYDRAMINE HYDROCHLORIDE 50 MG/ML
12.5 INJECTION INTRAMUSCULAR; INTRAVENOUS
Status: DISCONTINUED | OUTPATIENT
Start: 2018-10-19 | End: 2018-10-19 | Stop reason: HOSPADM

## 2018-10-19 RX ORDER — FENTANYL CITRATE 50 UG/ML
INJECTION, SOLUTION INTRAMUSCULAR; INTRAVENOUS AS NEEDED
Status: DISCONTINUED | OUTPATIENT
Start: 2018-10-19 | End: 2018-10-19 | Stop reason: SURG

## 2018-10-19 RX ORDER — PROPOFOL 10 MG/ML
VIAL (ML) INTRAVENOUS AS NEEDED
Status: DISCONTINUED | OUTPATIENT
Start: 2018-10-19 | End: 2018-10-19 | Stop reason: SURG

## 2018-10-19 RX ORDER — FLUMAZENIL 0.1 MG/ML
0.2 INJECTION INTRAVENOUS AS NEEDED
Status: DISCONTINUED | OUTPATIENT
Start: 2018-10-19 | End: 2018-10-19 | Stop reason: HOSPADM

## 2018-10-19 RX ORDER — ACETAMINOPHEN 325 MG/1
650 TABLET ORAL ONCE AS NEEDED
Status: DISCONTINUED | OUTPATIENT
Start: 2018-10-19 | End: 2018-10-19 | Stop reason: HOSPADM

## 2018-10-19 RX ORDER — NALOXONE HCL 0.4 MG/ML
0.2 VIAL (ML) INJECTION AS NEEDED
Status: DISCONTINUED | OUTPATIENT
Start: 2018-10-19 | End: 2018-10-19 | Stop reason: HOSPADM

## 2018-10-19 RX ORDER — LABETALOL HYDROCHLORIDE 5 MG/ML
5 INJECTION, SOLUTION INTRAVENOUS
Status: DISCONTINUED | OUTPATIENT
Start: 2018-10-19 | End: 2018-10-19 | Stop reason: HOSPADM

## 2018-10-19 RX ORDER — ONDANSETRON 2 MG/ML
4 INJECTION INTRAMUSCULAR; INTRAVENOUS ONCE AS NEEDED
Status: DISCONTINUED | OUTPATIENT
Start: 2018-10-19 | End: 2018-10-19 | Stop reason: HOSPADM

## 2018-10-19 RX ORDER — BACITRACIN 50000 [IU]/1
INJECTION, POWDER, FOR SOLUTION INTRAMUSCULAR AS NEEDED
Status: DISCONTINUED | OUTPATIENT
Start: 2018-10-19 | End: 2018-10-19 | Stop reason: HOSPADM

## 2018-10-19 RX ORDER — 0.9 % SODIUM CHLORIDE 0.9 %
VIAL (ML) INJECTION AS NEEDED
Status: DISCONTINUED | OUTPATIENT
Start: 2018-10-19 | End: 2018-10-19 | Stop reason: HOSPADM

## 2018-10-19 RX ORDER — EPHEDRINE SULFATE 50 MG/ML
5 INJECTION, SOLUTION INTRAVENOUS ONCE AS NEEDED
Status: DISCONTINUED | OUTPATIENT
Start: 2018-10-19 | End: 2018-10-19 | Stop reason: HOSPADM

## 2018-10-19 RX ORDER — MEPERIDINE HYDROCHLORIDE 50 MG/ML
12.5 INJECTION INTRAMUSCULAR; INTRAVENOUS; SUBCUTANEOUS
Status: DISCONTINUED | OUTPATIENT
Start: 2018-10-19 | End: 2018-10-19 | Stop reason: HOSPADM

## 2018-10-19 RX ORDER — BUPIVACAINE HCL/0.9 % NACL/PF 0.1 %
2 PLASTIC BAG, INJECTION (ML) EPIDURAL ONCE
Status: COMPLETED | OUTPATIENT
Start: 2018-10-19 | End: 2018-10-19

## 2018-10-19 RX ADMIN — PHENYLEPHRINE HYDROCHLORIDE 100 MCG: 10 INJECTION INTRAVENOUS at 16:20

## 2018-10-19 RX ADMIN — LIDOCAINE HYDROCHLORIDE 70 MG: 20 INJECTION, SOLUTION INFILTRATION; PERINEURAL at 16:09

## 2018-10-19 RX ADMIN — OXYCODONE HYDROCHLORIDE AND ACETAMINOPHEN 1 TABLET: 5; 325 TABLET ORAL at 19:44

## 2018-10-19 RX ADMIN — SODIUM CHLORIDE, SODIUM GLUCONATE, SODIUM ACETATE, POTASSIUM CHLORIDE, AND MAGNESIUM CHLORIDE: 526; 502; 368; 37; 30 INJECTION, SOLUTION INTRAVENOUS at 17:15

## 2018-10-19 RX ADMIN — FENTANYL CITRATE 100 MCG: 50 INJECTION, SOLUTION INTRAMUSCULAR; INTRAVENOUS at 16:09

## 2018-10-19 RX ADMIN — FENTANYL CITRATE 50 MCG: 50 INJECTION, SOLUTION INTRAMUSCULAR; INTRAVENOUS at 17:22

## 2018-10-19 RX ADMIN — ROCURONIUM BROMIDE 30 MG: 10 INJECTION INTRAVENOUS at 16:09

## 2018-10-19 RX ADMIN — PROPOFOL 150 MG: 10 INJECTION, EMULSION INTRAVENOUS at 16:09

## 2018-10-19 RX ADMIN — Medication 2 G: at 16:19

## 2018-10-19 RX ADMIN — HYDROMORPHONE HYDROCHLORIDE 0.5 MG: 1 INJECTION, SOLUTION INTRAMUSCULAR; INTRAVENOUS; SUBCUTANEOUS at 18:44

## 2018-10-19 RX ADMIN — HYDROMORPHONE HYDROCHLORIDE 0.5 MG: 1 INJECTION, SOLUTION INTRAMUSCULAR; INTRAVENOUS; SUBCUTANEOUS at 18:58

## 2018-10-19 RX ADMIN — DEXAMETHASONE SODIUM PHOSPHATE 4 MG: 4 INJECTION, SOLUTION INTRAMUSCULAR; INTRAVENOUS at 16:57

## 2018-10-19 RX ADMIN — SODIUM CHLORIDE, SODIUM GLUCONATE, SODIUM ACETATE, POTASSIUM CHLORIDE, AND MAGNESIUM CHLORIDE 1000 ML: 526; 502; 368; 37; 30 INJECTION, SOLUTION INTRAVENOUS at 13:55

## 2018-10-19 RX ADMIN — PHENYLEPHRINE HYDROCHLORIDE 100 MCG: 10 INJECTION INTRAVENOUS at 16:50

## 2018-10-19 NOTE — ANESTHESIA PREPROCEDURE EVALUATION
Anesthesia Evaluation     history of anesthetic complications: PONV  NPO Solid Status: > 8 hours  NPO Liquid Status: > 2 hours           Airway   Mallampati: II  TM distance: >3 FB  Neck ROM: full  Possible difficult intubation  Dental - normal exam     Pulmonary - normal exam    breath sounds clear to auscultation  (-) not a smoker    ROS comment: snores  Cardiovascular - normal exam  Exercise tolerance: good (4-7 METS)    ECG reviewed  Rhythm: regular  Rate: normal    (+) hyperlipidemia,   (-) angina    ROS comment: Normal sinus rhythm  Normal ECG  When compared with ECG of 18-FEB-2016 12:42,  premature ventricular complexes are no longer present  Confirmed by JOE    Neuro/Psych  (+) headaches (occ),     GI/Hepatic/Renal/Endo      Musculoskeletal     (+) neck pain,       ROS comment: Right clavicle fracture  Abdominal    Substance History   (+) alcohol use (socially),      OB/GYN negative ob/gyn ROS         Other        (-) history of cancer                  Anesthesia Plan    ASA 2     general and regional     intravenous induction   Anesthetic plan, all risks, benefits, and alternatives have been provided, discussed and informed consent has been obtained with: patient and spouse/significant other.
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
## 2018-10-19 NOTE — ANESTHESIA PROCEDURE NOTES
Airway  Urgency: elective    Airway not difficult    General Information and Staff    Patient location during procedure: OR  CRNA: RICHARD BEARDEN    Indications and Patient Condition  Indications for airway management: airway protection    Preoxygenated: yes  Mask difficulty assessment: 1 - vent by mask    Final Airway Details  Final airway type: endotracheal airway      Successful airway: ETT  Cuffed: yes   Successful intubation technique: direct laryngoscopy  Facilitating devices/methods: intubating stylet  Endotracheal tube insertion site: oral  Blade: Sumanth  Blade size: 3  ETT size: 7.5 mm  Cormack-Lehane Classification: grade I - full view of glottis  Placement verified by: chest auscultation and capnometry   Measured from: lips  ETT to lips (cm): 20  Number of attempts at approach: 1

## 2018-10-19 NOTE — ANESTHESIA PROCEDURE NOTES
Peripheral Block    Pre-sedation assessment completed: 10/19/2018 4:00 PM    Patient location during procedure: OR  Start time: 10/19/2018 4:00 PM  Stop time: 10/19/2018 4:06 PM  Reason for block: at surgeon's request and post-op pain management  Performed by  Anesthesiologist: JOJO SIMON  Assisted by: ADELSO SALEH  Preanesthetic Checklist  Completed: patient identified, site marked, surgical consent, pre-op evaluation, timeout performed, IV checked, risks and benefits discussed and monitors and equipment checked  Prep:  Pt Position: supine  Sterile barriers:cap, gloves and mask  Prep: ChloraPrep  Patient monitoring: blood pressure monitoring, continuous pulse oximetry and EKG  Procedure  Sedation:yes  Performed under: MAC  Guidance:ultrasound guided  ULTRASOUND INTERPRETATION.  Using ultrasound guidance a 22 G gauge needle was placed in close proximity to the brachial plexus nerve, at which point, under ultrasound guidance anesthetic was injected in the area of the nerve and spread of the anesthesia was seen on ultrasound in close proximity thereto.  There were no abnormalities seen on ultrasound; a digital image was taken; and the patient tolerated the procedure with no complications. Images:still images obtained    Laterality:rightInjection Technique:single-shot  Needle Type:echogenic  Needle Gauge:22 G    Medications  Comment:Superficial cervical plexus block performed under ultrasound guidance as above  Images looked good and local spread was appropriate  Local Injected:ropivacaine 0.5% Local Amount Injected:10mL  Post Assessment  Injection Assessment: negative aspiration for heme, no paresthesia on injection and incremental injection  Patient Tolerance:comfortable throughout block  Complications:no

## 2018-10-19 NOTE — OP NOTE
CLAVICLE OPEN REDUCTION INTERNAL FIXATION  Procedure Note    Name:    Belen Larios  YOB: 1960  Date of surgery:   10/19/2018    Pre-op Diagnosis:   Hypercholesterolemia [E78.00]  History of pneumothorax [Z87.09]  Closed displaced fracture of shaft of right clavicle, initial encounter [S42.021A]  Pain of right clavicle [M89.8X1]  Closed fracture of multiple ribs of right side, initial encounter [S22.41XA]    Post-op Diagnosis:    Post-Op Diagnosis Codes:     * Hypercholesterolemia [E78.00]     * History of pneumothorax [Z87.09]     * Closed displaced fracture of shaft of right clavicle, initial encounter [S42.021A]     * Pain of right clavicle [M89.8X1]     * Closed fracture of multiple ribs of right side, initial encounter [S22.41XA]    Procedure:  Procedure(s):  CLAVICLE OPEN REDUCTION INTERNAL FIXATION with iliac crest bone graft right side.         Surgeon:  Surgeon(s):  Phillip Grant MD    ASSISTANT:  Bisi Almaguer CSA    Anesthesia: General    Staff:   Circulator: Ness Amaro RN; Maile Russell RN  Radiology Technologist: Noemi Rodriguez II  Scrub Person: Liliana Crowley  Assistant: Bisi Almaguer CSA    Estimated Blood Loss: minimal    Specimens:                None      Drains:  none    Findings:  Partially healed clavicle that was grossly displaced.    Complications: None    IMPLANTS:     Implant Name Type Inv. Item Serial No.  Lot No. LRB No. Used   PLT CLAV LP STR 8H RT - [id number] Implant PLT CLAV LP STR 8H RT  ACUMED  Right 1   SCRW SOFIYA 3.5 X16MM - [id number] Implant SCRW SOFIYA 3.5 X16MM  ACUMED  Right 1   SCRW SOFIYA 3.5X18MM - [id number] Implant SCRW SOFIYA 3.5X18MM  ACUMED  Right 1   SCRW SOFIYA LK 3.5X12MM - [id number] Implant SCRW SOFIYA LK 3.5X12MM  ACUMED  Right 1   SCRW SOFIYA LK 3.5X14MM - [id number] Implant SCRW SOFIYA LK 3.5X14MM  ACUMED  Right 3         PROCEDURE:    INDICATIONS: The patient is a 58-year-old white female who suffered a clavicle  fracture with flail chest and pneumothorax approximately 6-7 weeks ago. She had initially been treated nonoperatively for her clavicle but was continuing to have severe pain and significant dysfunction in her clavicle. Her ribs had healed significantly and her pulmonary status was drastically improved. After a long discussion with the patient she wished to proceed with open reduction and internal fixation, understanding that bone grafting would most likely be necessary due to the partial healing in a grossly displaced position. The situation mimics an unstable nonunion and therefore requires longer operative time and the bone grafting for final healing.     DESCRIPTION OF PROCEDURE: Once consent was obtained the patient was taken to the operating room and once adequate anesthesia was obtained the patient was positioned on the operating table with a longitudinal-rolled sheet between her shoulder blades. The patient was placed in a modified beach chair position and all bony prominences were well-padded and the head was secured. Surgical timeout was performed. The right upper extremity and the right hip area were prepped.     An incision was then made overlying the clavicle fracture. Dissection was carried down to the fracture site. Periosteum was elevated along the medial aspect of the clavicle and then laterally as well. There was a significant amount of fibrous soft tissue material in the fracture site. This was debrided and removed with a rongeur. There was a free bony fragment that was  the medial and lateral aspects of the clavicle. This was not attached to either segment of the clavicle but there was soft tissue fibrous material that was healing around the bony fragment. Bone ends of the medial and lateral aspect of the clavicle were sclerotic in nature. The free segment that was the old butterfly fragment was dissected free and was found to be devascularized. This was saved to be used as  corticocancellous bone graft. The free ends of the clavicle were then dissected free and mobilized to allow for reduction. The sclerotic ends of the bone were debrided to allow for fresh bleeding bone edges. Once adequate mobilization was felt to be achieved then the C-arm imaging was utilized to confirm acceptable alignment radiographically as well. Due to the amount of sclerotic bone and the amount of bone loss at the fracture site it was felt that bone grafting would give the best chance for final healing and complete healing.     At this point the wound was covered and the hip area was exposed and an incision was made over the iliac crest on the right-hand side. Dissection was carried down to the crest and the periosteum was incised. The iliac crest was then exposed and a trap door was created with a 2.0 drill bit and the osteotomes to open the anterior aspect of the crest. Curettes were then used to gain cancellous bone grafting material from the inside of the iliac crest. This was kept in a cup with saline solution. Once an adequate amount of bone graft material was obtained then the wound was copiously irrigated with Bacitracin saline. The trap door was closed and the periosteum was closed with 0 Vicryl over the trap door opening in the crest. The subcutaneous tissue was then closed with 0 Vicryl and 2-0 Vicryl and then this wound was covered as well.     The attention was then returned to the clavicle. A plate was then applied onto the superior surface of the clavicle and reduction was maintained using lion jaw clamps. C-arm imaging was used to confirm acceptable position and alignment of the fracture as well as placement of the plate. The positioning was felt to be acceptable and so then a nonlocking screw was placed on either side of the fracture site in the oblong hole. Locking screws x2 were then placed on either side of the fracture site. The 2 screw holes that were directly on either side of the  fracture site were not utilized because this is where the obliquity of the fracture was and it was not felt that adequate fixation would be held in these screw holes. The final C-arm imaging confirmed acceptable position and alignment and the screw lengths were confirmed. Bone graft material was mixed with the small segment of bone that was removed and this was crunched to allow for basically a corticocancellous bone graft material. This was packed around the fracture site under the plate. Final 2-view x-ray of the clavicle confirmed acceptable position and alignment of the fracture, placement of the plate/screws, as well as screw lengths. The wound had been copiously irrigated with Bacitracin saline prior to placement of the bone graft. The periosteum was then closed using 0 Vicryl as a deep layer. The subcutaneous tissue was closed using 2-0 Vicryl and then the skin was closed using skin staples. The wound on the hip was then closed with skin staples as well. Both wounds were covered with Xeroform gauze, 4 x 4's, and Mefix dressing. The patient was then awakened and taken to the recovery room in good condition. She tolerated the procedure very well. Each wound had also been injected with 10 mL of Marcaine with epinephrine.           Phillip Grant MD     Date: 10/19/2018  Time: 6:48 PM

## 2018-10-20 NOTE — ANESTHESIA POSTPROCEDURE EVALUATION
Patient: Belen Larios    Procedure Summary     Date:  10/19/18 Room / Location:  [facility] OR  / [facility] OR    Anesthesia Start:  1553 Anesthesia Stop:  1829    Procedure:  CLAVICLE OPEN REDUCTION INTERNAL FIXATION with iliac crest bone graft right side.     (C-ARM#3) (Right Shoulder) Diagnosis:       Hypercholesterolemia      History of pneumothorax      Closed displaced fracture of shaft of right clavicle, initial encounter      Pain of right clavicle      Closed fracture of multiple ribs of right side, initial encounter      (Hypercholesterolemia [E78.00])      (History of pneumothorax [Z87.09])      (Closed displaced fracture of shaft of right clavicle, initial encounter [S42.021A])      (Pain of right clavicle [M89.8X1])      (Closed fracture of multiple ribs of right side, initial encounter [S22.41XA])    Surgeon:  Phillip Grant MD Provider:  Efren Cavazos MD    Anesthesia Type:  general, regional ASA Status:  2          Anesthesia Type: general, regional  Last vitals  BP   169/82 (10/19/18 2003)   Temp   98.3 °F (36.8 °C) (10/19/18 2003)   Pulse   82 (10/19/18 2003)   Resp   20 (10/19/18 2003)     SpO2   98 % (10/19/18 2003)     Post Anesthesia Care and Evaluation    Patient location during evaluation: PACU  Patient participation: complete - patient participated  Level of consciousness: awake and alert  Pain score: 0  Pain management: adequate  Airway patency: patent  Anesthetic complications: No anesthetic complications  PONV Status: none  Cardiovascular status: acceptable  Respiratory status: acceptable  Hydration status: acceptable

## 2018-10-20 NOTE — DISCHARGE INSTRUCTIONS
Minor Surgery  Outpatient Instructions    General Information  You have had a minor surgical procedure and are not expected to require extensive treatment or a long recovery period.  However, the following information/instructions that are listed serve as guidelines to help you recover at home.    Activity:  Wear sling at all times    Hygiene:  May shower once bandage is removed. No tub baths/submerging wounds in water.     Dressing/Wound Care:  May remove dressing in 2 days.     Diet:  As tolerated    Important Points:  -Call your doctor to report any of the following:   -unusual or excessive bleeding/drainage   -pain not reduced/controlled by medication   -elevated temperature consistently greater that 100 degrees F   -increased swelling, redness, bruising, or tenderness in surgical area  -Take medications as prescribed by your physician  -If you have any questions, please contact your doctor or the Same Day Surgery Unit at   [phone]  -If a post-operative emergency occurs, report to your nearest ER

## 2018-10-29 DIAGNOSIS — S42.021A CLOSED DISPLACED FRACTURE OF SHAFT OF RIGHT CLAVICLE, INITIAL ENCOUNTER: Primary | ICD-10-CM

## 2018-10-30 ENCOUNTER — OFFICE VISIT (OUTPATIENT)
Dept: ORTHOPEDIC SURGERY | Facility: CLINIC | Age: 58
End: 2018-10-30

## 2018-10-30 VITALS — BODY MASS INDEX: 24.59 KG/M2 | HEIGHT: 64 IN | WEIGHT: 144 LBS

## 2018-10-30 DIAGNOSIS — M89.8X1 PAIN OF RIGHT CLAVICLE: ICD-10-CM

## 2018-10-30 DIAGNOSIS — S42.021A CLOSED DISPLACED FRACTURE OF SHAFT OF RIGHT CLAVICLE, INITIAL ENCOUNTER: Primary | ICD-10-CM

## 2018-10-30 PROCEDURE — 99024 POSTOP FOLLOW-UP VISIT: CPT | Performed by: ORTHOPAEDIC SURGERY

## 2018-11-26 DIAGNOSIS — S42.021A CLOSED DISPLACED FRACTURE OF SHAFT OF RIGHT CLAVICLE, INITIAL ENCOUNTER: Primary | ICD-10-CM

## 2018-11-27 ENCOUNTER — OFFICE VISIT (OUTPATIENT)
Dept: ORTHOPEDIC SURGERY | Facility: CLINIC | Age: 58
End: 2018-11-27

## 2018-11-27 VITALS — WEIGHT: 149.2 LBS | BODY MASS INDEX: 25.47 KG/M2 | HEIGHT: 64 IN

## 2018-11-27 DIAGNOSIS — S42.021D CLOSED DISPLACED FRACTURE OF SHAFT OF RIGHT CLAVICLE WITH ROUTINE HEALING, SUBSEQUENT ENCOUNTER: Primary | ICD-10-CM

## 2018-11-27 PROCEDURE — 99024 POSTOP FOLLOW-UP VISIT: CPT | Performed by: ORTHOPAEDIC SURGERY

## 2018-11-27 RX ORDER — OXYCODONE AND ACETAMINOPHEN 7.5; 325 MG/1; MG/1
1 TABLET ORAL EVERY 6 HOURS PRN
Qty: 30 TABLET | Refills: 0 | Status: SHIPPED | OUTPATIENT
Start: 2018-11-27 | End: 2018-12-27

## 2018-11-27 NOTE — PROGRESS NOTES
"Belen Larios is a 58 y.o. female returns for     Chief Complaint   Patient presents with   • Right Clavicle - Follow-up     Xray today       HISTORY OF PRESENT ILLNESS:  Follow up Right clavicle.  ORIF 10/19/18,  Xray today.  Patient reports more pan the last week.  Pain level 6/10 today.  Having more pain over the last week but was more active.  More soreness, some aching.         CONCURRENT MEDICAL HISTORY:    The following portions of the patient's history were reviewed and updated as appropriate: allergies, current medications, past family history, past medical history, past social history, past surgical history and problem list.     ROS  No fevers or chills.  No chest pain or shortness of air.  No GI or  disturbances.    PHYSICAL EXAMINATION:       Ht 162.6 cm (64\")   Wt 67.7 kg (149 lb 3.2 oz)   LMP  (LMP Unknown)   BMI 25.61 kg/m²     Physical Exam   Constitutional: She is oriented to person, place, and time. She appears well-developed and well-nourished.   Neurological: She is alert and oriented to person, place, and time.   Psychiatric: She has a normal mood and affect. Her behavior is normal. Judgment and thought content normal.       GAIT:     [x]  Normal  []  Antalgic    Assistive device: [x]  None  []  Walker     []  Crutches  []  Cane     []  Wheelchair  []  Stretcher    Right Shoulder Exam     Tenderness   Right shoulder tenderness location: mild tenderness.    Range of Motion   Active abduction: 90   Forward flexion: 90     Muscle Strength   Abduction: 3/5   Supraspinatus: 3/5     Other   Erythema: absent  Scars: present  Sensation: normal  Pulse: present              Xr Clavicle Right    Result Date: 11/28/2018  Narrative: Ordering Provider:  Phillip Grant MD Ordering Diagnosis/Indication:  Closed displaced fracture of shaft of right clavicle, initial encounter Procedure:  XR CLAVICLE RIGHT Exam Date:  11/27/18 COMPARISON:  Todays X-rays were compared to previous images dated " October 30, 2018.     Impression:  2 views of the right clavicle show acceptable position and alignment of a midshaft clavicle fracture status post ORIF.  No sign of implant loosening or failure is noted.  Progressive healing is noted in comparison to prior x-ray.  Presence of hardware in the lower cervical spine consistent with prior cervical fusion noted on the periphery of the x-ray. Phillip Grant MD 11/27/18     Xr Clavicle Right    Result Date: 10/30/2018  Narrative: Ordering Provider:  Phillip Grant MD Ordering Diagnosis/Indication:  Closed displaced fracture of shaft of right clavicle, initial encounter Procedure:  XR CLAVICLE RIGHT Exam Date:  10/30/18 COMPARISON:  Todays X-rays were compared to previous images dated October 16, 2018.     Impression:  2 views of the right clavicle show acceptable position and alignment of a clavicle fracture status post ORIF.  No sign of implant loosening or failure is noted.  Surgical clips are noted on both views.  Interval fixation is noted in comparison to prior x-rays.  There is also noted fixation for a lower cervical spinal fusion as well with no interval change.  Bone grafting material is noted at the fracture site of the clavicle. Phillip Grant MD 10/30/18             ASSESSMENT:    Diagnoses and all orders for this visit:    Closed displaced fracture of shaft of right clavicle with routine healing, subsequent encounter  -     Ambulatory Referral to Physical Therapy Evaluate and treat    Other orders  -     oxyCODONE-acetaminophen (PERCOCET) 7.5-325 MG per tablet; Take 1 tablet by mouth Every 6 (Six) Hours As Needed for Moderate Pain .          PLAN    Slow to move.  Will continue with motion and activity   Will begin PT for ROM and begin mild strengthening.  Recheck in 4 week with repeat xrays.  Slowly progress as tolerated.    Patient's Body mass index is 25.61 kg/m². BMI is within normal parameters. No follow-up required.      Return  in about 4 weeks (around 12/25/2018) for Recheck with repeat xrays.    Phillip Grant MD

## 2018-12-04 ENCOUNTER — HOSPITAL ENCOUNTER (OUTPATIENT)
Dept: PHYSICAL THERAPY | Facility: HOSPITAL | Age: 58
Setting detail: THERAPIES SERIES
Discharge: HOME | End: 2018-12-04

## 2018-12-04 DIAGNOSIS — S42.021D CLOSED DISPLACED FRACTURE OF SHAFT OF RIGHT CLAVICLE WITH ROUTINE HEALING, SUBSEQUENT ENCOUNTER: Primary | ICD-10-CM

## 2018-12-04 PROCEDURE — 97110 THERAPEUTIC EXERCISES: CPT | Performed by: PHYSICAL THERAPIST

## 2018-12-04 PROCEDURE — 97161 PT EVAL LOW COMPLEX 20 MIN: CPT | Performed by: PHYSICAL THERAPIST
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
## 2018-12-07 ENCOUNTER — HOSPITAL ENCOUNTER (OUTPATIENT)
Dept: PHYSICAL THERAPY | Facility: HOSPITAL | Age: 58
Setting detail: THERAPIES SERIES
Discharge: HOME | End: 2018-12-07

## 2018-12-07 DIAGNOSIS — S42.021D CLOSED DISPLACED FRACTURE OF SHAFT OF RIGHT CLAVICLE WITH ROUTINE HEALING, SUBSEQUENT ENCOUNTER: Primary | ICD-10-CM

## 2018-12-07 PROCEDURE — 97110 THERAPEUTIC EXERCISES: CPT

## 2018-12-07 NOTE — THERAPY TREATMENT NOTE
Outpatient Physical Therapy Ortho Treatment Note  [facility]     Patient Name: Belen Larios  : 1960  MRN: [id number]  Today's Date: 2018      Visit Date: 2018   Attendance:   Subjective improvement:N/A  Recert: 18  MD Appointment: 18      Visit Dx:    ICD-10-CM ICD-9-CM   1. Closed displaced fracture of shaft of right clavicle with routine healing, subsequent encounter S42.021D V54.11       Patient Active Problem List   Diagnosis   • Neck pain, acute   • Skin sensation disturbance   • Cervical pain (neck)   • Numbness and tingling   • Degenerative disc disease, cervical   • Pain of right clavicle   • Hypercholesterolemia   • History of pneumothorax   • Closed displaced fracture of shaft of right clavicle   • Multiple closed fractures of ribs of right side        Past Medical History:   Diagnosis Date   • Acute upper respiratory infection    • Allergic rhinitis    • Backache     Backache - possible IT band      • Cough    • Encounter for general adult medical examination without abnormal findings    • Fibrocystic breast    • Health maintenance examination     Adult health examination      • History of screening mammography     Screening mammography      • Hyperlipidemia    • Hyperthyroidism     Hyperthyroidism - hx of same     • Pharyngitis    • Rhinitis    • Sacroiliitis (CMS/HCC)     Sacroiliitis, not elsewhere classified      • Skin lesion     Skin lesion - lesion right cheek      • Upper respiratory infection    • Visit for gynecologic examination         Past Surgical History:   Procedure Laterality Date   • CERVICAL SPINE SURGERY  10/28/2010    Cervical spine disk surgery (1)   • COLONOSCOPY  10/27/2011    Colonoscopy, diagnostic (screening) 47360 (1)      • ENDOSCOPY  2008    EGD w/ tube 13694 (1)    Hypopharynx, esophagus,NRL. 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 Multiple cold biop was obtained from the antrum. A single cold biop was obtained & placed on  PyloriTek strip. Pylorus, duodenum NRL.    • INJECTION OF MEDICATION  12/08/2015    Kenalog (4)      • INJECTION OF MEDICATION  01/17/2015    Rocephin (2)      • INJECTION OF MEDICATION  01/17/2015    Solu-Medrol (1)     • PAP SMEAR  06/22/2015    WNL, CARD SENT, DR. RAMON'S OFFICE   • PROCEDURE GENERIC CONVERTED  02/18/2016    Consult, Dermatology (1)          PT Ortho     Row Name 12/07/18 1100       Precautions and Contraindications    Precautions  C6-7 fusion  -EM       Posture/Observations    Posture/Observations Comments  No apparent distress  -EM      User Key  (r) = Recorded By, (t) = Taken By, (c) = Cosigned By    Initials Name Provider Type    Ramos Huertas, PTA Physical Therapy Assistant                      PT Assessment/Plan     Row Name 12/07/18 1100          PT Assessment    Functional Limitations  Limitations in functional capacity and performance;Performance in self-care ADL;Limitation in home management;Performance in work activities  -EM     Impairments  Range of motion;Pain;Muscle strength  -EM     Assessment Comments  Pt deja tx well with new therex. Tx limited due to a Code Red at facility.   -EM     Rehab Potential  Good  -EM     Patient/caregiver participated in establishment of treatment plan and goals  Yes  -EM     Patient would benefit from skilled therapy intervention  Yes  -EM        PT Plan    PT Frequency  2x/week  -EM     Predicted Duration of Therapy Intervention (Therapy Eval)  6wks  -EM     PT Plan Comments  Update HEP issue shoulder pulley for HEP. Cont to gently progress as deja  -EM       User Key  (r) = Recorded By, (t) = Taken By, (c) = Cosigned By    Initials Name Provider Type    Ramos Huertas PTA Physical Therapy Assistant              Exercises     Row Name 12/07/18 1100             Subjective Comments    Subjective Comments  Pt states she is doing well, has most of her difficulty with abd. Pt reports full compliance with current HEP.   -EM         Exercise 1     Exercise Name 1  PRO II-AAROM UE  -EM      Time 1  10'  -EM         Exercise 2    Exercise Name 2  3 Way Shoulder Pulley  -EM      Sets 2  1  -EM      Reps 2  20  -EM         Exercise 3    Exercise Name 3  AAROM w/ Cane: Flex, Abd  -EM      Sets 3  1  -EM      Reps 3  20  -EM         Exercise 4    Exercise Name 4  Tx terminated due to Code Red at facility  -EM        User Key  (r) = Recorded By, (t) = Taken By, (c) = Cosigned By    Initials Name Provider Type    EM Ramos Lake, PTA Physical Therapy Assistant                         PT OP Goals     Row Name 12/07/18 1100          PT Short Term Goals    STG Date to Achieve  12/25/18  -EM     STG 1  Patient will be independent in home exercise program  -EM     STG 1 Progress  Met  (Significant)   -EM     STG 2  Patient will have active shoulder flexion 140°  -EM     STG 2 Progress  Not Met  -EM     STG 3  Patient will have active shoulder abduction 110°  -EM     STG 3 Progress  Not Met  -EM     STG 4  Patient will have active external rotation to 70° at 90° abduction  -EM     STG 4 Progress  Not Met  -EM     STG 5   Patient will have internal rotation to 60° at 90° abduction  -EM     STG 5 Progress  Not Met  -EM     STG 6  Patient will have quick Dash score of 36% or less  -EM     STG 6 Progress  Not Met  -EM        Long Term Goals    LTG Date to Achieve  01/04/19  -EM     LTG 1  Patient will be able to report subjectively 75-80% overall improvement  -EM     LTG 1 Progress  Not Met  -EM     LTG 2  Patient be independent in final home exercise program  -EM     LTG 2 Progress  Not Met  -EM     LTG 3  Patient will have active shoulder flexion 160°  -EM     LTG 3 Progress  Not Met  -EM     LTG 4  Patient will have active shoulder abduction to 150°  -EM     LTG 4 Progress  Not Met  -EM     LTG 5  Patient will have quick Dash score of less than 20% deficit  -EM     LTG 5 Progress  Not Met  -EM     LTG 6  Patient will have met Patient goals of returning to work and put  hair up in a ponytail  -EM     LTG 6 Progress  Not Met  -EM        Time Calculation    PT Goal Re-Cert Due Date  12/25/18  -EM       User Key  (r) = Recorded By, (t) = Taken By, (c) = Cosigned By    Initials Name Provider Type    EM Ramos Lake, SHELLEY Physical Therapy Assistant                         Time Calculation:   Start Time: 1004  Stop Time: 1044  Time Calculation (min): 40 min  Total Timed Code Minutes- PT: 40 minute(s)  Therapy Suggested Charges     Code   Minutes Charges    None           Therapy Charges for Today     Code Description Service Date Service Provider Modifiers Qty    84694755484 HC PT THER PROC EA 15 MIN 12/7/2018 Ramos Lake PTA GP 3                    Ramos Lake PTA  12/7/2018

## 2018-12-10 ENCOUNTER — HOSPITAL ENCOUNTER (OUTPATIENT)
Dept: PHYSICAL THERAPY | Facility: HOSPITAL | Age: 58
Setting detail: THERAPIES SERIES
Discharge: HOME | End: 2018-12-10

## 2018-12-10 DIAGNOSIS — S42.021D CLOSED DISPLACED FRACTURE OF SHAFT OF RIGHT CLAVICLE WITH ROUTINE HEALING, SUBSEQUENT ENCOUNTER: Primary | ICD-10-CM

## 2018-12-10 PROCEDURE — G0283 ELEC STIM OTHER THAN WOUND: HCPCS

## 2018-12-10 PROCEDURE — 97110 THERAPEUTIC EXERCISES: CPT

## 2018-12-10 NOTE — THERAPY TREATMENT NOTE
Outpatient Physical Therapy Ortho Treatment Note  [facility]     Patient Name: Belen Larios  : 1960  MRN: [id number]  Today's Date: 12/10/2018      Visit Date: 12/10/2018   Attendance: 3/3  Subjective improvement: N/A  Recert: 18  MD Appointment: 18      Visit Dx:    ICD-10-CM ICD-9-CM   1. Closed displaced fracture of shaft of right clavicle with routine healing, subsequent encounter S42.021D V54.11       Patient Active Problem List   Diagnosis   • Neck pain, acute   • Skin sensation disturbance   • Cervical pain (neck)   • Numbness and tingling   • Degenerative disc disease, cervical   • Pain of right clavicle   • Hypercholesterolemia   • History of pneumothorax   • Closed displaced fracture of shaft of right clavicle   • Multiple closed fractures of ribs of right side        Past Medical History:   Diagnosis Date   • Acute upper respiratory infection    • Allergic rhinitis    • Backache     Backache - possible IT band      • Cough    • Encounter for general adult medical examination without abnormal findings    • Fibrocystic breast    • Health maintenance examination     Adult health examination      • History of screening mammography     Screening mammography      • Hyperlipidemia    • Hyperthyroidism     Hyperthyroidism - hx of same     • Pharyngitis    • Rhinitis    • Sacroiliitis (CMS/HCC)     Sacroiliitis, not elsewhere classified      • Skin lesion     Skin lesion - lesion right cheek      • Upper respiratory infection    • Visit for gynecologic examination         Past Surgical History:   Procedure Laterality Date   • CERVICAL SPINE SURGERY  10/28/2010    Cervical spine disk surgery (1)   • COLONOSCOPY  10/27/2011    Colonoscopy, diagnostic (screening) 12460 (1)      • ENDOSCOPY  2008    EGD w/ tube 53666 (1)    Hypopharynx, esophagus,NRL. 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 Multiple cold biop was obtained from the antrum. A single cold biop was obtained & placed  on PyloriTek strip. Pylorus, duodenum NRL.    • INJECTION OF MEDICATION  12/08/2015    Kenalog (4)      • INJECTION OF MEDICATION  01/17/2015    Rocephin (2)      • INJECTION OF MEDICATION  01/17/2015    Solu-Medrol (1)     • PAP SMEAR  06/22/2015    WNL, CARD SENT, DR. RAMON'S OFFICE   • PROCEDURE GENERIC CONVERTED  02/18/2016    Consult, Dermatology (1)          PT Ortho     Row Name 12/10/18 1100       Precautions and Contraindications    Precautions  C6-7 fusion  -EM       Posture/Observations    Posture/Observations Comments  No acute distress w/   -EM       Right Upper Ext    Rt Shoulder Abduction PROM  125  -EM    Rt Shoulder Flexion PROM  143  -EM    Rt Shoulder External Rotation PROM  45  -EM    Rt Shoulder Internal Rotation PROM  53  -EM      User Key  (r) = Recorded By, (t) = Taken By, (c) = Cosigned By    Initials Name Provider Type    EM Ramos Lake, PTA Physical Therapy Assistant                      PT Assessment/Plan     Row Name 12/10/18 1100          PT Assessment    Functional Limitations  Limitations in functional capacity and performance;Performance in self-care ADL;Limitation in home management;Performance in work activities  -EM     Impairments  Range of motion;Pain;Muscle strength  -EM     Assessment Comments  Pt deja tx well with good improvements noted with flexion, Abd, and ER.  -EM     Rehab Potential  Good  -EM     Patient/caregiver participated in establishment of treatment plan and goals  Yes  -EM     Patient would benefit from skilled therapy intervention  Yes  -EM        PT Plan    PT Frequency  2x/week  -EM     Predicted Duration of Therapy Intervention (Therapy Eval)  6 wks  -EM     PT Plan Comments  Add Wall slides next tx. Reduce Scap Retraction without TB due to sight increaes in pain afterwards. Cont to gently progress as deja. Progress to supine AROM, AAROM against gravity.   -EM       User Key  (r) = Recorded By, (t) = Taken By, (c) = Cosigned By    Initials Name Provider  Type    EM Ramos Lake, SHELLEY Physical Therapy Assistant          Modalities     Row Name 12/10/18 1100             Subjective Pain    Able to rate subjective pain?  yes  -EM      Pre-Treatment Pain Level  4  -EM      Post-Treatment Pain Level  3  -EM         ELECTRICAL STIMULATION    Attended/Unattended  Unattended  -EM      Stimulation Type  IFC w/ ice  -EM      Max mAmp  6  -EM      Location/Electrode Placement/Other  R shoulder 10'  -EM        User Key  (r) = Recorded By, (t) = Taken By, (c) = Cosigned By    Initials Name Provider Type    EM Ramos Lake PTA Physical Therapy Assistant          Exercises     Row Name 12/10/18 1100             Subjective Comments    Subjective Comments   Pt reports no new changes since last tx.   -EM         Subjective Pain    Able to rate subjective pain?  yes  -EM      Pre-Treatment Pain Level  4  -EM      Post-Treatment Pain Level  3  -EM         Exercise 1    Exercise Name 1  PRO II-AAROM UE/LE  -EM      Time 1  10'  -EM         Exercise 2    Exercise Name 2  3 Way Shoulder Pulley  -EM      Sets 2  1  -EM      Reps 2  30  -EM         Exercise 3    Exercise Name 3  Scap Retraction w/ TB  -EM      Sets 3  1  -EM      Reps 3  20  -EM      Additional Comments  Yellow-Slight increase in pain noted afterward  -EM         Exercise 4    Exercise Name 4  Lat Pulls w/ TB  -EM      Sets 4  1  -EM      Reps 4  20  -EM      Additional Comments  Yellow  -EM         Exercise 5    Exercise Name 5  Shoulder Shruggs  -EM      Sets 5  1  -EM      Reps 5  20  -EM         Exercise 6    Exercise Name 6  AAROM w/ Cane: Flex, Abd  -EM      Sets 6  1  -EM      Reps 6  30  -EM         Exercise 7    Exercise Name 7  Sup Shoulder Pendulum: CW, CCW, Up/down, Side/side  -EM      Sets 7  1  -EM      Reps 7  20  -EM         Exercise 8    Exercise Name 8  Sup Serratus Punch   -EM      Sets 8  1  -EM      Reps 8  20  -EM         Exercise 9    Exercise Name 9  PROM  -EM      Time 9  16'  -EM          Exercise 10    Exercise Name 10  IFC w/ ice  -EM      Time 10  10'  -EM        User Key  (r) = Recorded By, (t) = Taken By, (c) = Cosigned By    Initials Name Provider Type    KEEGAN Ramos Lake, SHELLEY Physical Therapy Assistant                        Manual Rx (last 36 hours)      Manual Treatments     Row Name 12/10/18 1100             Manual Rx 1    Manual Rx 1 Location  R shoulder   -EM      Manual Rx 1 Type  PROM: Flex, Abd, IR, ER  -EM      Manual Rx 1 Duration  16'  -EM        User Key  (r) = Recorded By, (t) = Taken By, (c) = Cosigned By    Initials Name Provider Type    KEEGAN LakeRamos, SHELLEY Physical Therapy Assistant          PT OP Goals     Row Name 12/10/18 1100          PT Short Term Goals    STG Date to Achieve  12/25/18  -EM     STG 1  Patient will be independent in home exercise program  -EM     STG 1 Progress  Met  (Significant)   -EM     STG 2  Patient will have active shoulder flexion 140°  -EM     STG 2 Progress  Not Met  -EM     STG 3  Patient will have active shoulder abduction 110°  -EM     STG 3 Progress  Not Met  -EM     STG 4  Patient will have active external rotation to 70° at 90° abduction  -EM     STG 4 Progress  Not Met  -EM     STG 5   Patient will have internal rotation to 60° at 90° abduction  -EM     STG 5 Progress  Not Met  -EM     STG 6  Patient will have quick Dash score of 36% or less  -EM     STG 6 Progress  Not Met  -EM        Long Term Goals    LTG Date to Achieve  01/04/19  -EM     LTG 1  Patient will be able to report subjectively 75-80% overall improvement  -EM     LTG 1 Progress  Not Met  -EM     LTG 2  Patient be independent in final home exercise program  -EM     LTG 2 Progress  Not Met  -EM     LTG 3  Patient will have active shoulder flexion 160°  -EM     LTG 3 Progress  Not Met  -EM     LTG 4  Patient will have active shoulder abduction to 150°  -EM     LTG 4 Progress  Not Met  -EM     LTG 5  Patient will have quick Dash score of less than 20% deficit  -EM     LTG 5  Progress  Not Met  -EM     LTG 6  Patient will have met Patient goals of returning to work and put hair up in a ponytail  -EM     LTG 6 Progress  Not Met  -EM        Time Calculation    PT Goal Re-Cert Due Date  12/25/18  -EM       User Key  (r) = Recorded By, (t) = Taken By, (c) = Cosigned By    Initials Name Provider Type    EM Ramos Lake, SHELLEY Physical Therapy Assistant          Therapy Education  Education Details: Updated HEP issued with pulley system: AAROM w/ cane: flex, abd, IR, ER, Ext, Scap Retraction  Given: HEP  Program: Progressed  How Provided: Verbal, Demonstration, Written  Provided to: Patient  Level of Understanding: Verbalized, Demonstrated              Time Calculation:   Start Time: 1113  Stop Time: 1225  Time Calculation (min): 72 min  Total Timed Code Minutes- PT: 72 minute(s)  Therapy Suggested Charges     Code   Minutes Charges    None           Therapy Charges for Today     Code Description Service Date Service Provider Modifiers Qty    02695538550 HC PT THER PROC EA 15 MIN 12/10/2018 Ramos Lake, PTA GP 4    69946995077 HC PT ELECTRICAL STIM UNATTENDED 12/10/2018 Ramos Lake, PTA  1                    Ramos Lake PTA  12/10/2018

## 2018-12-12 ENCOUNTER — HOSPITAL ENCOUNTER (OUTPATIENT)
Dept: PHYSICAL THERAPY | Facility: HOSPITAL | Age: 58
Setting detail: THERAPIES SERIES
Discharge: HOME | End: 2018-12-12

## 2018-12-12 DIAGNOSIS — S42.021D CLOSED DISPLACED FRACTURE OF SHAFT OF RIGHT CLAVICLE WITH ROUTINE HEALING, SUBSEQUENT ENCOUNTER: Primary | ICD-10-CM

## 2018-12-12 PROCEDURE — 97110 THERAPEUTIC EXERCISES: CPT

## 2018-12-12 PROCEDURE — G0283 ELEC STIM OTHER THAN WOUND: HCPCS

## 2018-12-12 NOTE — THERAPY TREATMENT NOTE
Outpatient Physical Therapy Ortho Treatment Note  [facility]     Patient Name: Belen Larios  : 1960  MRN: [id number]  Today's Date: 2018      Visit Date: 2018   Attendance:   Subjective improvement: N/A  Recert: 18  MD Appointment: 18        Visit Dx:    ICD-10-CM ICD-9-CM   1. Closed displaced fracture of shaft of right clavicle with routine healing, subsequent encounter S42.021D V54.11       Patient Active Problem List   Diagnosis   • Neck pain, acute   • Skin sensation disturbance   • Cervical pain (neck)   • Numbness and tingling   • Degenerative disc disease, cervical   • Pain of right clavicle   • Hypercholesterolemia   • History of pneumothorax   • Closed displaced fracture of shaft of right clavicle   • Multiple closed fractures of ribs of right side        Past Medical History:   Diagnosis Date   • Acute upper respiratory infection    • Allergic rhinitis    • Backache     Backache - possible IT band      • Cough    • Encounter for general adult medical examination without abnormal findings    • Fibrocystic breast    • Health maintenance examination     Adult health examination      • History of screening mammography     Screening mammography      • Hyperlipidemia    • Hyperthyroidism     Hyperthyroidism - hx of same     • Pharyngitis    • Rhinitis    • Sacroiliitis (CMS/HCC)     Sacroiliitis, not elsewhere classified      • Skin lesion     Skin lesion - lesion right cheek      • Upper respiratory infection    • Visit for gynecologic examination         Past Surgical History:   Procedure Laterality Date   • CERVICAL SPINE SURGERY  10/28/2010    Cervical spine disk surgery (1)   • COLONOSCOPY  10/27/2011    Colonoscopy, diagnostic (screening) 50754 (1)      • ENDOSCOPY  2008    EGD w/ tube 29189 (1)    Hypopharynx, esophagus,NRL. 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 Multiple cold biop was obtained from the antrum. A single cold biop was obtained & placed  on PyloriTek strip. Pylorus, duodenum NRL.    • INJECTION OF MEDICATION  12/08/2015    Kenalog (4)      • INJECTION OF MEDICATION  01/17/2015    Rocephin (2)      • INJECTION OF MEDICATION  01/17/2015    Solu-Medrol (1)     • PAP SMEAR  06/22/2015    WNL, CARD SENT, DR. RAMON'S OFFICE   • PROCEDURE GENERIC CONVERTED  02/18/2016    Consult, Dermatology (1)          PT Ortho     Row Name 12/12/18 1000       Precautions and Contraindications    Precautions  C6-7 fusion  -EM       Posture/Observations    Posture/Observations Comments  Very little guarding noted with PROM.   -EM       Right Upper Ext    Rt Shoulder Abduction PROM  130  -EM    Rt Shoulder Flexion PROM  150  -EM    Rt Shoulder External Rotation PROM  45  -EM    Rt Shoulder Internal Rotation PROM  to belly  -EM    Row Name 12/10/18 1100       Precautions and Contraindications    Precautions  C6-7 fusion  -EM       Posture/Observations    Posture/Observations Comments  No acute distress w/   -EM       Right Upper Ext    Rt Shoulder Abduction PROM  125  -EM    Rt Shoulder Flexion PROM  143  -EM    Rt Shoulder External Rotation PROM  45  -EM    Rt Shoulder Internal Rotation PROM  53  -EM      User Key  (r) = Recorded By, (t) = Taken By, (c) = Cosigned By    Initials Name Provider Type    EM Ramos Lake, PTA Physical Therapy Assistant                      PT Assessment/Plan     Row Name 12/12/18 1000          PT Assessment    Functional Limitations  Limitations in functional capacity and performance;Performance in self-care ADL;Limitation in home management;Performance in work activities  -EM     Impairments  Range of motion;Pain;Muscle strength  -EM     Assessment Comments  Pt deja tx well with initiation of AROM in a gravity minimized motion. Pt cont to make good gains with PROM measurements this tx.   -EM     Rehab Potential  Good  -EM     Patient/caregiver participated in establishment of treatment plan and goals  Yes  -EM     Patient would benefit from  skilled therapy intervention  Yes  -EM        PT Plan    PT Frequency  2x/week  -EM     Predicted Duration of Therapy Intervention (Therapy Eval)  6 weeks  -EM     PT Plan Comments  Sup AROM measurements next tx.   -EM       User Key  (r) = Recorded By, (t) = Taken By, (c) = Cosigned By    Initials Name Provider Type    Ramos Huertas, SHELLEY Physical Therapy Assistant          Modalities     Row Name 12/12/18 1000             Subjective Pain    Post-Treatment Pain Level  4  -EM         ELECTRICAL STIMULATION    Attended/Unattended  Unattended  -EM      Stimulation Type  IFC w/ ice  -EM      Max mAmp  5  -EM      Location/Electrode Placement/Other  R shoulder 15'  -EM        User Key  (r) = Recorded By, (t) = Taken By, (c) = Cosigned By    Initials Name Provider Type    Ramos Huertas PTA Physical Therapy Assistant          Exercises     Row Name 12/12/18 1000             Subjective Comments    Subjective Comments  Pt reports some soreness after last tx.   -EM         Subjective Pain    Able to rate subjective pain?  yes  -EM      Pre-Treatment Pain Level  4  -EM      Post-Treatment Pain Level  4  -EM         Exercise 1    Exercise Name 1  PRO II-AAROM UE/LE  -EM      Time 1  10'  -EM         Exercise 2    Exercise Name 2  3 Way Shoulder Pulley  -EM      Sets 2  1  -EM      Reps 2  30  -EM         Exercise 3    Exercise Name 3  Scap Retraction   -EM      Sets 3  1  -EM      Reps 3  30  -EM         Exercise 4    Exercise Name 4  Shoulder Shruggs/Rolls  -EM      Sets 4  1  -EM      Reps 4  30  -EM         Exercise 5    Exercise Name 5  AAROM w/ Cane: Flex, Abd, IR, ER, Ext  -EM      Sets 5  1  -EM      Reps 5  30  -EM         Exercise 6    Exercise Name 6  Sup AROM: Flex,   -EM      Sets 6  1  -EM      Reps 6  30  -EM         Exercise 7    Exercise Name 7  SL AROM: Abd,ER  -EM      Sets 7  1  -EM      Reps 7  30  -EM         Exercise 8    Exercise Name 8  Sup Shoulder Pendulums: CW, CCW, up/down, side/side  -EM       Sets 8  1  -EM      Reps 8  30  -EM         Exercise 9    Exercise Name 9  Sup Serratus Punches  -EM      Sets 9  1  -EM      Reps 9  30  -EM         Exercise 10    Exercise Name 10  PROM:   -EM      Time 10  10'  -EM         Exercise 11    Exercise Name 11  IFC w/ ice  -EM      Time 11  15'  -EM        User Key  (r) = Recorded By, (t) = Taken By, (c) = Cosigned By    Initials Name Provider Type    EM Ramos Lake, PTA Physical Therapy Assistant                         PT OP Goals     Row Name 12/12/18 1000          PT Short Term Goals    STG Date to Achieve  12/25/18  -EM     STG 1  Patient will be independent in home exercise program  -EM     STG 1 Progress  Met  (Significant)   -EM     STG 2  Patient will have active shoulder flexion 140°  -EM     STG 2 Progress  Not Met  -EM     STG 3  Patient will have active shoulder abduction 110°  -EM     STG 3 Progress  Not Met  -EM     STG 4  Patient will have active external rotation to 70° at 90° abduction  -EM     STG 4 Progress  Not Met  -EM     STG 5   Patient will have internal rotation to 60° at 90° abduction  -EM     STG 5 Progress  Not Met  -EM     STG 6  Patient will have quick Dash score of 36% or less  -EM     STG 6 Progress  Not Met  -EM        Long Term Goals    LTG Date to Achieve  01/04/19  -EM     LTG 1  Patient will be able to report subjectively 75-80% overall improvement  -EM     LTG 1 Progress  Not Met  -EM     LTG 2  Patient be independent in final home exercise program  -EM     LTG 2 Progress  Not Met  -EM     LTG 3  Patient will have active shoulder flexion 160°  -EM     LTG 3 Progress  Not Met  -EM     LTG 4  Patient will have active shoulder abduction to 150°  -EM     LTG 4 Progress  Not Met  -EM     LTG 5  Patient will have quick Dash score of less than 20% deficit  -EM     LTG 5 Progress  Not Met  -EM     LTG 6  Patient will have met Patient goals of returning to work and put hair up in a ponytail  -EM     LTG 6 Progress  Not Met  -EM         Time Calculation    PT Goal Re-Cert Due Date  12/25/18  -EM       User Key  (r) = Recorded By, (t) = Taken By, (c) = Cosigned By    Initials Name Provider Type    EM Ramos Lake PTA Physical Therapy Assistant                         Time Calculation:   Start Time: 1003  Stop Time: 1122  Time Calculation (min): 79 min  Total Timed Code Minutes- PT: 79 minute(s)  Therapy Suggested Charges     Code   Minutes Charges    None           Therapy Charges for Today     Code Description Service Date Service Provider Modifiers Qty    13312249154 HC PT THER PROC EA 15 MIN 12/12/2018 Ramos Lake PTA GP 4    18018817393 HC PT ELECTRICAL STIM UNATTENDED 12/12/2018 Ramos Lake PTA  1            Shoulder pulley issued with charges.      Ramos Lake PTA  12/12/2018

## 2018-12-18 ENCOUNTER — HOSPITAL ENCOUNTER (OUTPATIENT)
Dept: PHYSICAL THERAPY | Facility: HOSPITAL | Age: 58
Setting detail: THERAPIES SERIES
Discharge: HOME | End: 2018-12-18

## 2018-12-18 DIAGNOSIS — S42.021D CLOSED DISPLACED FRACTURE OF SHAFT OF RIGHT CLAVICLE WITH ROUTINE HEALING, SUBSEQUENT ENCOUNTER: Primary | ICD-10-CM

## 2018-12-18 PROCEDURE — 97110 THERAPEUTIC EXERCISES: CPT

## 2018-12-18 PROCEDURE — G0283 ELEC STIM OTHER THAN WOUND: HCPCS

## 2018-12-18 NOTE — THERAPY TREATMENT NOTE
Outpatient Physical Therapy Ortho Treatment Note  [facility]     Patient Name: Belen Larios  : 1960  MRN: [id number]  Today's Date: 2018      Visit Date: 2018   Attendance:   Subjective improvement: 50%  Recert: 18  MD Appointment: 18      Visit Dx:    ICD-10-CM ICD-9-CM   1. Closed displaced fracture of shaft of right clavicle with routine healing, subsequent encounter S42.021D V54.11       Patient Active Problem List   Diagnosis   • Neck pain, acute   • Skin sensation disturbance   • Cervical pain (neck)   • Numbness and tingling   • Degenerative disc disease, cervical   • Pain of right clavicle   • Hypercholesterolemia   • History of pneumothorax   • Closed displaced fracture of shaft of right clavicle   • Multiple closed fractures of ribs of right side        Past Medical History:   Diagnosis Date   • Acute upper respiratory infection    • Allergic rhinitis    • Backache     Backache - possible IT band      • Cough    • Encounter for general adult medical examination without abnormal findings    • Fibrocystic breast    • Health maintenance examination     Adult health examination      • History of screening mammography     Screening mammography      • Hyperlipidemia    • Hyperthyroidism     Hyperthyroidism - hx of same     • Pharyngitis    • Rhinitis    • Sacroiliitis (CMS/HCC)     Sacroiliitis, not elsewhere classified      • Skin lesion     Skin lesion - lesion right cheek      • Upper respiratory infection    • Visit for gynecologic examination         Past Surgical History:   Procedure Laterality Date   • CERVICAL SPINE SURGERY  10/28/2010    Cervical spine disk surgery (1)   • COLONOSCOPY  10/27/2011    Colonoscopy, diagnostic (screening) 29614 (1)      • ENDOSCOPY  2008    EGD w/ tube 72164 (1)    Hypopharynx, esophagus,NRL. 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 Multiple cold biop was obtained from the antrum. A single cold biop was obtained & placed  on PyloriTek strip. Pylorus, duodenum NRL.    • INJECTION OF MEDICATION  12/08/2015    Kenalog (4)      • INJECTION OF MEDICATION  01/17/2015    Rocephin (2)      • INJECTION OF MEDICATION  01/17/2015    Solu-Medrol (1)     • PAP SMEAR  06/22/2015    WNL, CARD SENT, DR. RAMON'S OFFICE   • PROCEDURE GENERIC CONVERTED  02/18/2016    Consult, Dermatology (1)          PT Ortho     Row Name 12/18/18 1000       Precautions and Contraindications    Precautions  C6-7 fusion  -EM       Subjective Pain    Able to rate subjective pain?  yes  -EM    Pre-Treatment Pain Level  4  -EM    Post-Treatment Pain Level  4  -EM       Posture/Observations    Posture/Observations Comments  Crepitis noted with a small arc with flexion-non painful.   -EM       Right Upper Ext    Rt Shoulder Abduction AROM  128  -EM    Rt Shoulder Flexion AROM  140  -EM      User Key  (r) = Recorded By, (t) = Taken By, (c) = Cosigned By    Initials Name Provider Type    EM Ramos Lake, PTA Physical Therapy Assistant                      PT Assessment/Plan     Row Name 12/18/18 1000          PT Assessment    Functional Limitations  Limitations in functional capacity and performance;Performance in self-care ADL;Limitation in home management;Performance in work activities  -EM     Impairments  Range of motion;Pain;Muscle strength  -EM     Assessment Comments  Pt deja tx well meeting 2 goals this tx with AROM measurements. Pt con to have increased pain with ER, and Abd.   -EM     Rehab Potential  Good  -EM     Patient/caregiver participated in establishment of treatment plan and goals  Yes  -EM     Patient would benefit from skilled therapy intervention  Yes  -EM        PT Plan    PT Frequency  2x/week  -EM     Predicted Duration of Therapy Intervention (Therapy Eval)  6 weeks  -EM     PT Plan Comments  MD note next tx. Cont current POC, progress gently as deja. Add Seated no money next tx.   (Significant)   -EM       User Key  (r) = Recorded By, (t) = Taken  "By, (c) = Cosigned By    Initials Name Provider Type    EM Ramos Lake, SHELLEY Physical Therapy Assistant              Exercises     Row Name 12/18/18 1000             Subjective Comments    Subjective Comments  \"I feel im hurting more than I should\"   -EM         Subjective Pain    Able to rate subjective pain?  yes  -EM      Pre-Treatment Pain Level  4  -EM      Post-Treatment Pain Level  4  -EM         Exercise 1    Exercise Name 1  PRO II-AAROM UE/LE  -EM      Time 1  10'  -EM         Exercise 2    Exercise Name 2  3 Way Shoulder Pulley  -EM      Sets 2  1  -EM      Reps 2  30  -EM         Exercise 3    Exercise Name 3  Scap Retraction   -EM      Sets 3  1  -EM      Reps 3  30  -EM         Exercise 4    Exercise Name 4  Shoulder Shruggs/Rolls  -EM      Sets 4  1  -EM      Reps 4  30  -EM         Exercise 5    Exercise Name 5  Seated AROM: Flex, Abd  -EM      Sets 5  1  -EM      Reps 5  20  -EM         Exercise 6    Exercise Name 6  Shoulder TB: Ext, Add, IR  -EM      Sets 6  1  -EM      Reps 6  20  -EM      Additional Comments  Yellow  -EM         Exercise 7    Exercise Name 7  Wall Taps  -EM      Sets 7  3  -EM      Reps 7  5 Over and Back  -EM         Exercise 8    Exercise Name 8  Sup Shoulder Pendulums: CW, CCW, up/down, Side/side  -EM      Sets 8  1  -EM      Reps 8  30  -EM         Exercise 9    Exercise Name 9  PROM  -EM      Time 9  10'  -EM         Exercise 10    Exercise Name 10  IFC w/ ice  -EM      Time 10  15'  -EM        User Key  (r) = Recorded By, (t) = Taken By, (c) = Cosigned By    Initials Name Provider Type    EM Ramos Lake, SHELLEY Physical Therapy Assistant                        Manual Rx (last 36 hours)      Manual Treatments     Row Name 12/18/18 1100             Manual Rx 1    Manual Rx 1 Location  R shoulder   -EM      Manual Rx 1 Type  PROM: Flex, Abd, IR, ER  -EM      Manual Rx 1 Duration  10'  -EM        User Key  (r) = Recorded By, (t) = Taken By, (c) = Cosigned By    Initials Name " Provider Type    EM Ramos Lake, PTA Physical Therapy Assistant          PT OP Goals     Row Name 12/18/18 1000          PT Short Term Goals    STG Date to Achieve  12/25/18  -EM     STG 1  Patient will be independent in home exercise program  -EM     STG 1 Progress  Met  (Significant)   -EM     STG 2  Patient will have active shoulder flexion 140°  -EM     STG 2 Progress  Met  (Significant)   -EM     STG 3  Patient will have active shoulder abduction 110°  -EM     STG 3 Progress  Met  (Significant)   -EM     STG 4  Patient will have active external rotation to 70° at 90° abduction  -EM     STG 4 Progress  Not Met  -EM     STG 5   Patient will have internal rotation to 60° at 90° abduction  -EM     STG 5 Progress  Not Met  -EM     STG 6  Patient will have quick Dash score of 36% or less  -EM     STG 6 Progress  Not Met  -EM        Long Term Goals    LTG Date to Achieve  01/04/19  -EM     LTG 1  Patient will be able to report subjectively 75-80% overall improvement  -EM     LTG 1 Progress  Not Met  -EM     LTG 2  Patient be independent in final home exercise program  -EM     LTG 2 Progress  Not Met  -EM     LTG 3  Patient will have active shoulder flexion 160°  -EM     LTG 3 Progress  Not Met  -EM     LTG 4  Patient will have active shoulder abduction to 150°  -EM     LTG 4 Progress  Not Met  -EM     LTG 5  Patient will have quick Dash score of less than 20% deficit  -EM     LTG 5 Progress  Not Met  -EM     LTG 6  Patient will have met Patient goals of returning to work and put hair up in a ponytail  -EM     LTG 6 Progress  Not Met  -EM        Time Calculation    PT Goal Re-Cert Due Date  12/25/18  -EM       User Key  (r) = Recorded By, (t) = Taken By, (c) = Cosigned By    Initials Name Provider Type    EM Ramos Lake, PTA Physical Therapy Assistant                         Time Calculation:   Start Time: 1005  Stop Time: 1116  Time Calculation (min): 71 min  Total Timed Code Minutes- PT: 71 minute(s)  Therapy  Suggested Charges     Code   Minutes Charges    None           Therapy Charges for Today     Code Description Service Date Service Provider Modifiers Qty    38654785353 HC PT THER PROC EA 15 MIN 12/18/2018 Ramos Lake, PTA GP 4    99819760302 HC PT ELECTRICAL STIM UNATTENDED 12/18/2018 Ramos Lake, PTA  1                    Ramos Lake, PTA  12/18/2018

## 2018-12-21 ENCOUNTER — HOSPITAL ENCOUNTER (OUTPATIENT)
Dept: PHYSICAL THERAPY | Facility: HOSPITAL | Age: 58
Setting detail: THERAPIES SERIES
Discharge: HOME | End: 2018-12-21

## 2018-12-21 DIAGNOSIS — S42.021D CLOSED DISPLACED FRACTURE OF SHAFT OF RIGHT CLAVICLE WITH ROUTINE HEALING, SUBSEQUENT ENCOUNTER: Primary | ICD-10-CM

## 2018-12-21 PROCEDURE — 97110 THERAPEUTIC EXERCISES: CPT

## 2018-12-21 NOTE — THERAPY TREATMENT NOTE
Outpatient Physical Therapy Ortho Treatment Note  [facility]     Patient Name: Belen Larios  : 1960  MRN: [id number]  Today's Date: 2018      Visit Date: 2018   Attendance:   Subjective improvement: 60%  Recert: 18  MD Appointment: 18-MD note sent with pt      Visit Dx:    ICD-10-CM ICD-9-CM   1. Closed displaced fracture of shaft of right clavicle with routine healing, subsequent encounter S42.021D V54.11       Patient Active Problem List   Diagnosis   • Neck pain, acute   • Skin sensation disturbance   • Cervical pain (neck)   • Numbness and tingling   • Degenerative disc disease, cervical   • Pain of right clavicle   • Hypercholesterolemia   • History of pneumothorax   • Closed displaced fracture of shaft of right clavicle   • Multiple closed fractures of ribs of right side        Past Medical History:   Diagnosis Date   • Acute upper respiratory infection    • Allergic rhinitis    • Backache     Backache - possible IT band      • Cough    • Encounter for general adult medical examination without abnormal findings    • Fibrocystic breast    • Health maintenance examination     Adult health examination      • History of screening mammography     Screening mammography      • Hyperlipidemia    • Hyperthyroidism     Hyperthyroidism - hx of same     • Pharyngitis    • Rhinitis    • Sacroiliitis (CMS/HCC)     Sacroiliitis, not elsewhere classified      • Skin lesion     Skin lesion - lesion right cheek      • Upper respiratory infection    • Visit for gynecologic examination         Past Surgical History:   Procedure Laterality Date   • CERVICAL SPINE SURGERY  10/28/2010    Cervical spine disk surgery (1)   • CLAVICLE OPEN REDUCTION INTERNAL FIXATION Right 10/19/2018    Procedure: CLAVICLE OPEN REDUCTION INTERNAL FIXATION with iliac crest bone graft right side.     (C-ARM#3);  Surgeon: Phillip Grant MD;  Location: [facility];  Service: Orthopedics   •  COLONOSCOPY  10/27/2011    Colonoscopy, diagnostic (screening) 94164 (1)      • ENDOSCOPY  03/05/2008    EGD w/ tube 75719 (1)    Hypopharynx, esophagus,NRL. 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 Multiple cold biop was obtained from the antrum. A single cold biop was obtained & placed on PyloriTek strip. Pylorus, duodenum NRL.    • INJECTION OF MEDICATION  12/08/2015    Kenalog (4)      • INJECTION OF MEDICATION  01/17/2015    Rocephin (2)      • INJECTION OF MEDICATION  01/17/2015    Solu-Medrol (1)     • PAP SMEAR  06/22/2015    WNL, CARD SENT, DR. RAMON'S OFFICE   • PROCEDURE GENERIC CONVERTED  02/18/2016    Consult, Dermatology (1)          PT Ortho     Row Name 12/21/18 1000       Precautions and Contraindications    Precautions  C6-7 fusion  -EM       Subjective Pain    Pre-Treatment Pain Level  3  -EM    Post-Treatment Pain Level  4  -EM       Right Upper Ext    Rt Shoulder Abduction AROM  132  -EM    Rt Shoulder Flexion AROM  149  -EM    Rt Shoulder External Rotation AROM  T2  -EM    Rt Shoulder Internal Rotation AROM  L1  -EM       MMT (Manual Muscle Testing)    General MMT Comments  R Shoulder: Flex: 4+/5, Abd: 4+/5, IR: 4+/5, ER: 4/5  -EM      User Key  (r) = Recorded By, (t) = Taken By, (c) = Cosigned By    Initials Name Provider Type    EM Ramos Lake, PTA Physical Therapy Assistant                      PT Assessment/Plan     Row Name 12/21/18 1000          PT Assessment    Functional Limitations  Limitations in functional capacity and performance;Performance in self-care ADL;Limitation in home management;Performance in work activities  -EM     Impairments  Range of motion;Pain;Muscle strength  -EM     Assessment Comments  Pt deja tx well with steady improvements noted. Pt currently tolerating gentle progression of therex well at this time.  Tx limited today due to a late arrival and needing to leave by 11:00  -EM     Rehab Potential  Good  -EM     Patient/caregiver participated in establishment of  treatment plan and goals  Yes  -EM     Patient would benefit from skilled therapy intervention  Yes  -EM        PT Plan    PT Frequency  2x/week  -EM     Predicted Duration of Therapy Intervention (Therapy Eval)  6 weeks  -EM     PT Plan Comments  Cont per MD recommendations. Return PROM next tx.   -EM       User Key  (r) = Recorded By, (t) = Taken By, (c) = Cosigned By    Initials Name Provider Type    EM Ramos Lake, PTA Physical Therapy Assistant              Exercises     Row Name 12/21/18 1000             Subjective Comments    Subjective Comments  Pt presents 4' late to tx.Pt denies any new changes since last tx. Pt states last tx went well. Pt states she needs to leave by 11:10.  -EM         Subjective Pain    Able to rate subjective pain?  yes  -EM      Pre-Treatment Pain Level  3  -EM      Post-Treatment Pain Level  4  -EM         Exercise 1    Exercise Name 1  PRO II-AAROM UE/LE  -EM      Time 1  10'  -EM         Exercise 2    Exercise Name 2  3 Way Shoulder Pulley  -EM      Sets 2  1  -EM      Reps 2  30  -EM         Exercise 3    Exercise Name 3  Scap Retraction   -EM      Sets 3  1  -EM      Reps 3  30  -EM         Exercise 4    Exercise Name 4  Shoulder Shruggs/Rolls  -EM      Sets 4  1  -EM      Reps 4  30  -EM         Exercise 5    Exercise Name 5  Seated AROM: Flex, Abd  -EM      Sets 5  1  -EM      Reps 5  30  -EM         Exercise 6    Exercise Name 6  Shoulder TB: Ext, Add, IR  -EM      Sets 6  1  -EM      Reps 6  30  -EM         Exercise 7    Exercise Name 7  Wall Taps  -EM      Sets 7  3  -EM      Reps 7  5 Over and Back  -EM         Exercise 8    Exercise Name 8   Press  -EM      Sets 8  1  -EM      Reps 8  20  -EM         Exercise 9    Exercise Name 9  Seated No Money  -EM      Sets 9  1  -EM      Reps 9  20  -EM         Exercise 10    Exercise Name 10  Pt declines IFC and ice due to needing to leave  -EM        User Key  (r) = Recorded By, (t) = Taken By, (c) = Cosigned By     Initials Name Provider Type    EM Ramos Lake, PTA Physical Therapy Assistant                         PT OP Goals     Row Name 12/21/18 1000          PT Short Term Goals    STG Date to Achieve  12/25/18  -EM     STG 1  Patient will be independent in home exercise program  -EM     STG 1 Progress  Met  (Significant)   -EM     STG 2  Patient will have active shoulder flexion 140°  -EM     STG 2 Progress  Met  (Significant)   -EM     STG 3  Patient will have active shoulder abduction 110°  -EM     STG 3 Progress  Met  (Significant)   -EM     STG 4  Patient will have active external rotation to 70° at 90° abduction  -EM     STG 4 Progress  Not Met  -EM     STG 5   Patient will have internal rotation to 60° at 90° abduction  -EM     STG 5 Progress  Not Met  -EM     STG 6  Patient will have quick Dash score of 36% or less  -EM     STG 6 Progress  Met  (Significant)   -EM        Long Term Goals    LTG Date to Achieve  01/04/19  -EM     LTG 1  Patient will be able to report subjectively 75-80% overall improvement  -EM     LTG 1 Progress  Not Met  -EM     LTG 2  Patient be independent in final home exercise program  -EM     LTG 2 Progress  Not Met  -EM     LTG 3  Patient will have active shoulder flexion 160°  -EM     LTG 3 Progress  Not Met  -EM     LTG 4  Patient will have active shoulder abduction to 150°  -EM     LTG 4 Progress  Not Met  -EM     LTG 5  Patient will have quick Dash score of less than 20% deficit  -EM     LTG 5 Progress  Not Met  -EM     LTG 6  Patient will have met Patient goals of returning to work and put hair up in a ponytail  -EM     LTG 6 Progress  Not Met  -EM        Time Calculation    PT Goal Re-Cert Due Date  12/25/18  -EM       User Key  (r) = Recorded By, (t) = Taken By, (c) = Cosigned By    Initials Name Provider Type    EM Ramos Lake, PTA Physical Therapy Assistant               Outcome Measure Options: Quick DASH  Quick DASH  Open a tight or new jar.: Mild Difficulty  Do heavy  household chores (e.g., wash walls, wash floors): Mild Difficulty  Carry a shopping bag or briefcase: No Difficulty  Wash your back: Mild Difficulty  Use a knife to cut food: No Difficulty  Recreational activities in which you take some force or impact through your arm, should or hand (e.g. golf, hammering, tennis, etc.): Moderate Difficulty  During the past week, to what extent has your arm, shoulder, or hand problem interfered with your normal social activites with family, friends, neighbors or groups?: Slightly  During the past week, were you limited in your work or other regular daily activities as a result of your arm, shoulder or hand problem?: Slightly Limited  Arm, Shoulder, or hand pain: Mild  Tingling (pins and needles) in your arm, shoulder, or hand: None  During the past week, how much difficulty have you had sleeping because of the pain in your arm, shoulder or hand?: Mild Difficulty  Number of Questions Answered: 11  Quick DASH Score: 20.45         Time Calculation:   Start Time: 1022  Stop Time: 1100  Time Calculation (min): 38 min  Total Timed Code Minutes- PT: 38 minute(s)  Therapy Suggested Charges     Code   Minutes Charges    None           Therapy Charges for Today     Code Description Service Date Service Provider Modifiers Qty    62003826937 HC PT THER PROC EA 15 MIN 12/21/2018 Ramos Lake, PTA GP 3          PT G-Codes  Outcome Measure Options: Quick DASH  Quick DASH Score: 20.45         Ramos Lake, SHELLEY  12/21/2018

## 2018-12-24 DIAGNOSIS — S42.021D CLOSED DISPLACED FRACTURE OF SHAFT OF RIGHT CLAVICLE WITH ROUTINE HEALING, SUBSEQUENT ENCOUNTER: Primary | ICD-10-CM

## 2018-12-27 ENCOUNTER — OFFICE VISIT (OUTPATIENT)
Dept: ORTHOPEDIC SURGERY | Facility: CLINIC | Age: 58
End: 2018-12-27

## 2018-12-27 VITALS — BODY MASS INDEX: 25.44 KG/M2 | WEIGHT: 149 LBS | HEIGHT: 64 IN

## 2018-12-27 DIAGNOSIS — S42.021D CLOSED DISPLACED FRACTURE OF SHAFT OF RIGHT CLAVICLE WITH ROUTINE HEALING, SUBSEQUENT ENCOUNTER: Primary | ICD-10-CM

## 2018-12-27 PROCEDURE — 99024 POSTOP FOLLOW-UP VISIT: CPT | Performed by: ORTHOPAEDIC SURGERY

## 2018-12-27 RX ORDER — OXYCODONE AND ACETAMINOPHEN 7.5; 325 MG/1; MG/1
1 TABLET ORAL EVERY 8 HOURS PRN
Qty: 20 TABLET | Refills: 0 | Status: SHIPPED | OUTPATIENT
Start: 2018-12-27 | End: 2019-02-07

## 2018-12-27 NOTE — PROGRESS NOTES
"Belen Larios is a 58 y.o. female returns for     Chief Complaint   Patient presents with   • Right Clavicle - Follow-up     Xray today       HISTORY OF PRESENT ILLNESS:  Follow up Right clavicle fracture.  ORIF on 10/19/18.  Xray today.  Therapy at [facility]. Patient brought in progress note.  She reports improvement.    Pain is getting better  Motion is getting better.     CONCURRENT MEDICAL HISTORY:    The following portions of the patient's history were reviewed and updated as appropriate: allergies, current medications, past family history, past medical history, past social history, past surgical history and problem list.     ROS  No fevers or chills.  No chest pain or shortness of air.  No GI or  disturbances.    PHYSICAL EXAMINATION:       Ht 162.6 cm (64\")   Wt 67.6 kg (149 lb)   LMP  (LMP Unknown)   BMI 25.58 kg/m²     Physical Exam   Constitutional: She is oriented to person, place, and time. She appears well-developed and well-nourished.   Neurological: She is alert and oriented to person, place, and time.   Psychiatric: She has a normal mood and affect. Her behavior is normal. Judgment and thought content normal.       GAIT:     [x]  Normal  []  Antalgic    Assistive device: [x]  None  []  Walker     []  Crutches  []  Cane     []  Wheelchair  []  Stretcher    Right Shoulder Exam     Tenderness   The patient is experiencing no tenderness.    Range of Motion   Active abduction: 120   Forward flexion: 150     Other   Erythema: absent  Scars: present              Xr Clavicle Right    Result Date: 12/27/2018  Narrative: Ordering Provider:  Phillip Grant MD Ordering Diagnosis/Indication:  Closed displaced fracture of shaft of right clavicle with routine healing, subsequent encounter Procedure:  XR CLAVICLE RIGHT Exam Date:  12/27/18 COMPARISON:  Todays X-rays were compared to previous images dated November 27, 2018.     Impression:  2 views of the right clavicle show acceptable position and " alignment of the clavicle fracture status post ORIF.  Progressive healing is noted in comparison to prior x-ray.  No sign of implant loosening or failure is noted.  No other acute bony abnormality is noted.  Also present on the x-ray is hardware in the lower cervical spine consistent with prior cervical fusion.  No change in this hardware in comparison to prior x-ray. Phillip Grant MD 12/27/18             ASSESSMENT:    Diagnoses and all orders for this visit:    Closed displaced fracture of shaft of right clavicle with routine healing, subsequent encounter    Other orders  -     oxyCODONE-acetaminophen (PERCOCET) 7.5-325 MG per tablet; Take 1 tablet by mouth Every 8 (Eight) Hours As Needed.          PLAN    Continue PT  Plan return to work on Jan 7  Continue HEP with strengthening and conditioning.  Continue icing    Patient's Body mass index is 25.58 kg/m². BMI is above normal parameters. Recommendations include: exercise counseling and nutrition counseling.      Return in about 6 weeks (around 2/7/2019) for Recheck with repeat xrays.    Phillip Grant MD

## 2018-12-28 ENCOUNTER — HOSPITAL ENCOUNTER (OUTPATIENT)
Dept: PHYSICAL THERAPY | Facility: HOSPITAL | Age: 58
Setting detail: THERAPIES SERIES
Discharge: HOME | End: 2018-12-28

## 2018-12-28 DIAGNOSIS — S42.021D CLOSED DISPLACED FRACTURE OF SHAFT OF RIGHT CLAVICLE WITH ROUTINE HEALING, SUBSEQUENT ENCOUNTER: Primary | ICD-10-CM

## 2018-12-28 PROCEDURE — 97110 THERAPEUTIC EXERCISES: CPT

## 2018-12-28 NOTE — THERAPY TREATMENT NOTE
Outpatient Physical Therapy Ortho Treatment Note  [facility]     Patient Name: Belen Larios  : 1960  MRN: [id number]  Today's Date: 2018      Visit Date: 2018   Attendance:   Subjective improvement: 60%  Recert:18  MD Appointment: 19      Visit Dx:    ICD-10-CM ICD-9-CM   1. Closed displaced fracture of shaft of right clavicle with routine healing, subsequent encounter S42.021D V54.11       Patient Active Problem List   Diagnosis   • Neck pain, acute   • Skin sensation disturbance   • Cervical pain (neck)   • Numbness and tingling   • Degenerative disc disease, cervical   • Pain of right clavicle   • Hypercholesterolemia   • History of pneumothorax   • Closed displaced fracture of shaft of right clavicle   • Multiple closed fractures of ribs of right side        Past Medical History:   Diagnosis Date   • Acute upper respiratory infection    • Allergic rhinitis    • Backache     Backache - possible IT band      • Cough    • Encounter for general adult medical examination without abnormal findings    • Fibrocystic breast    • Health maintenance examination     Adult health examination      • History of screening mammography     Screening mammography      • Hyperlipidemia    • Hyperthyroidism     Hyperthyroidism - hx of same     • Pharyngitis    • Rhinitis    • Sacroiliitis (CMS/HCC)     Sacroiliitis, not elsewhere classified      • Skin lesion     Skin lesion - lesion right cheek      • Upper respiratory infection    • Visit for gynecologic examination         Past Surgical History:   Procedure Laterality Date   • CERVICAL SPINE SURGERY  10/28/2010    Cervical spine disk surgery (1)   • CLAVICLE OPEN REDUCTION INTERNAL FIXATION Right 10/19/2018    Procedure: CLAVICLE OPEN REDUCTION INTERNAL FIXATION with iliac crest bone graft right side.     (C-ARM#3);  Surgeon: Phillip Grant MD;  Location: [facility];  Service: Orthopedics   • COLONOSCOPY  10/27/2011     "Colonoscopy, diagnostic (screening) 08680 (1)      • ENDOSCOPY  03/05/2008    EGD w/ tube 33000 (1)    Hypopharynx, esophagus,NRL. 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 Multiple cold biop was obtained from the antrum. A single cold biop was obtained & placed on PyloriTek strip. Pylorus, duodenum NRL.    • INJECTION OF MEDICATION  12/08/2015    Kenalog (4)      • INJECTION OF MEDICATION  01/17/2015    Rocephin (2)      • INJECTION OF MEDICATION  01/17/2015    Solu-Medrol (1)     • PAP SMEAR  06/22/2015    WNL, CARD SENT, DR. RAMON'S OFFICE   • PROCEDURE GENERIC CONVERTED  02/18/2016    Consult, Dermatology (1)          PT Ortho     Row Name 12/28/18 0900       Subjective Comments    Subjective Comments  Pt reports she did well over the holidays.  Went to MD yesterday. Pt to RTW on 1/7/19.  -EM       Precautions and Contraindications    Precautions  C6-7 fusion  -EM    Contraindications  \"Slowly progress as tolerated.\"  (Significant)   -EM       Posture/Observations    Posture/Observations Comments  Pain noted at endrange with all motions  -EM      User Key  (r) = Recorded By, (t) = Taken By, (c) = Cosigned By    Initials Name Provider Type    EM Ramos Lake, PTA Physical Therapy Assistant                      PT Assessment/Plan     Row Name 12/28/18 0900          PT Assessment    Functional Limitations  Limitations in functional capacity and performance;Performance in self-care ADL;Limitation in home management;Performance in work activities  -EM     Impairments  Range of motion;Pain;Muscle strength  -EM     Assessment Comments  Pt deja tx well with progress exercise and increased resistance.  Pt cont to be most limited with ER  -EM     Rehab Potential  Good  -EM     Patient/caregiver participated in establishment of treatment plan and goals  Yes  -EM     Patient would benefit from skilled therapy intervention  Yes  -EM        PT Plan    PT Frequency  2x/week  -EM     Predicted Duration of Therapy Intervention (Therapy " Eval)  6 weeks  -EM     PT Plan Comments  Cont to gently progress as tolerated-pain limited.   -EM       User Key  (r) = Recorded By, (t) = Taken By, (c) = Cosigned By    Initials Name Provider Type    Ramos Huertas, SHELLEY Physical Therapy Assistant          Modalities     Row Name 12/28/18 0900             Subjective Pain    Post-Treatment Pain Level  4  -EM         Ice    Ice Applied  Yes ice to go  -EM        User Key  (r) = Recorded By, (t) = Taken By, (c) = Cosigned By    Initials Name Provider Type    Ramos Huertas, SHELLEY Physical Therapy Assistant          Exercises     Row Name 12/28/18 0900             Subjective Comments    Subjective Comments  Pt reports she did well over the holidays.  Went to MD yesterday. Pt to RTW on 1/7/19.  -EM         Subjective Pain    Able to rate subjective pain?  yes  -EM      Pre-Treatment Pain Level  3  -EM      Post-Treatment Pain Level  4  -EM         Exercise 1    Exercise Name 1  PRO II-AAROM UE/LE  -EM         Exercise 2    Exercise Name 2  3 Way Shoulder Pulley  -EM         Exercise 3    Exercise Name 3  Wall Slides  -EM      Sets 3  1  -EM      Reps 3  30  -EM         Exercise 4    Exercise Name 4  Scap Retraction w/ TB  -EM      Sets 4  1  -EM      Reps 4  20  -EM      Additional Comments  Yellow   -EM         Exercise 5    Exercise Name 5  Lat Pulls w/ TB  -EM      Sets 5  1  -EM      Reps 5  20  -EM      Additional Comments  Yellow  -EM         Exercise 6    Exercise Name 6  Shoulder TB: Ext, Add, IR  -EM      Sets 6  1  -EM      Reps 6  30  -EM      Additional Comments  Yellow  -EM         Exercise 7    Exercise Name 7  D2 PNF Flexion w/ DB  -EM      Sets 7  1  -EM      Reps 7  20  -EM         Exercise 8    Exercise Name 8  St AROM: Flex, Abd  -EM      Sets 8  1  -EM      Reps 8  30  -EM         Exercise 9    Exercise Name 9  Wall taps w/ MB  -EM      Sets 9  1  -EM      Reps 9  10 Over and Back  -EM      Additional Comments  2# MB  -EM         Exercise 10     Exercise Name 10  PROM  -EM      Time 10  14'  -EM         Exercise 11    Exercise Name 11  Declines IFC, ice to go  -EM        User Key  (r) = Recorded By, (t) = Taken By, (c) = Cosigned By    Initials Name Provider Type    Ramos Huertas, SHELLEY Physical Therapy Assistant                        Manual Rx (last 36 hours)      Manual Treatments     Row Name 12/28/18 0900             Manual Rx 1    Manual Rx 1 Location  R shoulder   -EM      Manual Rx 1 Type  PROM: Flex, Abd, IR, ER  -EM      Manual Rx 1 Duration  14'  -EM        User Key  (r) = Recorded By, (t) = Taken By, (c) = Cosigned By    Initials Name Provider Type    Ramos Huertas, SHELLEY Physical Therapy Assistant          PT OP Goals     Row Name 12/28/18 0900          PT Short Term Goals    STG Date to Achieve  12/25/18  -EM     STG 1  Patient will be independent in home exercise program  -EM     STG 1 Progress  Met  (Significant)   -EM     STG 2  Patient will have active shoulder flexion 140°  -EM     STG 2 Progress  Met  (Significant)   -EM     STG 3  Patient will have active shoulder abduction 110°  -EM     STG 3 Progress  Met  (Significant)   -EM     STG 4  Patient will have active external rotation to 70° at 90° abduction  -EM     STG 4 Progress  Not Met  -EM     STG 5   Patient will have internal rotation to 60° at 90° abduction  -EM     STG 5 Progress  Not Met  -EM     STG 6  Patient will have quick Dash score of 36% or less  -EM     STG 6 Progress  Met  (Significant)   -EM        Long Term Goals    LTG Date to Achieve  01/04/19  -EM     LTG 1  Patient will be able to report subjectively 75-80% overall improvement  -EM     LTG 1 Progress  Not Met  -EM     LTG 2  Patient be independent in final home exercise program  -EM     LTG 2 Progress  Not Met  -EM     LTG 3  Patient will have active shoulder flexion 160°  -EM     LTG 3 Progress  Not Met  -EM     LTG 4  Patient will have active shoulder abduction to 150°  -EM     LTG 4 Progress  Not Met  -EM      LTG 5  Patient will have quick Dash score of less than 20% deficit  -EM     LTG 5 Progress  Not Met  -EM     LTG 6  Patient will have met Patient goals of returning to work and put hair up in a ponytail  -EM     LTG 6 Progress  Not Met  -EM        Time Calculation    PT Goal Re-Cert Due Date  12/25/18  -EM       User Key  (r) = Recorded By, (t) = Taken By, (c) = Cosigned By    Initials Name Provider Type    EM Ramos Lake PTA Physical Therapy Assistant                         Time Calculation:   Start Time: 0850  Stop Time: 0943  Time Calculation (min): 53 min  Total Timed Code Minutes- PT: 53 minute(s)  Therapy Suggested Charges     Code   Minutes Charges    None           Therapy Charges for Today     Code Description Service Date Service Provider Modifiers Qty    79783884299 HC PT THER PROC EA 15 MIN 12/28/2018 Ramos Lake PTA GP 4                    Ramos Lake PTA  12/28/2018

## 2019-01-03 ENCOUNTER — HOSPITAL ENCOUNTER (OUTPATIENT)
Dept: PHYSICAL THERAPY | Facility: HOSPITAL | Age: 59
Setting detail: THERAPIES SERIES
Discharge: HOME | End: 2019-01-03

## 2019-01-03 DIAGNOSIS — S42.021D CLOSED DISPLACED FRACTURE OF SHAFT OF RIGHT CLAVICLE WITH ROUTINE HEALING, SUBSEQUENT ENCOUNTER: Primary | ICD-10-CM

## 2019-01-03 PROCEDURE — G0283 ELEC STIM OTHER THAN WOUND: HCPCS

## 2019-01-03 PROCEDURE — 97110 THERAPEUTIC EXERCISES: CPT

## 2019-01-03 NOTE — THERAPY TREATMENT NOTE
Outpatient Physical Therapy Ortho Treatment Note  [facility]     Patient Name: Belen Larios  : 1960  MRN: [id number]  Today's Date: 1/3/2019      Visit Date: 2019   Attendance:   Subjective improvement:60%  Recert: 18  MD Appointment: 19      Visit Dx:    ICD-10-CM ICD-9-CM   1. Closed displaced fracture of shaft of right clavicle with routine healing, subsequent encounter S42.021D V54.11       Patient Active Problem List   Diagnosis   • Neck pain, acute   • Skin sensation disturbance   • Cervical pain (neck)   • Numbness and tingling   • Degenerative disc disease, cervical   • Pain of right clavicle   • Hypercholesterolemia   • History of pneumothorax   • Closed displaced fracture of shaft of right clavicle   • Multiple closed fractures of ribs of right side        Past Medical History:   Diagnosis Date   • Acute upper respiratory infection    • Allergic rhinitis    • Backache     Backache - possible IT band      • Cough    • Encounter for general adult medical examination without abnormal findings    • Fibrocystic breast    • Health maintenance examination     Adult health examination      • History of screening mammography     Screening mammography      • Hyperlipidemia    • Hyperthyroidism     Hyperthyroidism - hx of same     • Pharyngitis    • Rhinitis    • Sacroiliitis (CMS/HCC)     Sacroiliitis, not elsewhere classified      • Skin lesion     Skin lesion - lesion right cheek      • Upper respiratory infection    • Visit for gynecologic examination         Past Surgical History:   Procedure Laterality Date   • CERVICAL SPINE SURGERY  10/28/2010    Cervical spine disk surgery (1)   • CLAVICLE OPEN REDUCTION INTERNAL FIXATION Right 10/19/2018    Procedure: CLAVICLE OPEN REDUCTION INTERNAL FIXATION with iliac crest bone graft right side.     (C-ARM#3);  Surgeon: Phillip rGant MD;  Location: [facility];  Service: Orthopedics   • COLONOSCOPY  10/27/2011     "Colonoscopy, diagnostic (screening) 35030 (1)      • ENDOSCOPY  03/05/2008    EGD w/ tube 19676 (1)    Hypopharynx, esophagus,NRL. 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 Multiple cold biop was obtained from the antrum. A single cold biop was obtained & placed on PyloriTek strip. Pylorus, duodenum NRL.    • INJECTION OF MEDICATION  12/08/2015    Kenalog (4)      • INJECTION OF MEDICATION  01/17/2015    Rocephin (2)      • INJECTION OF MEDICATION  01/17/2015    Solu-Medrol (1)     • PAP SMEAR  06/22/2015    WNL, CARD SENT, DR. RAMON'S OFFICE   • PROCEDURE GENERIC CONVERTED  02/18/2016    Consult, Dermatology (1)          PT Ortho     Row Name 01/03/19 1500       Precautions and Contraindications    Precautions  C6-7 fusion  -EM    Contraindications  \"Slowly progress as tolerated.\"  (Significant)   -EM      User Key  (r) = Recorded By, (t) = Taken By, (c) = Cosigned By    Initials Name Provider Type    Ramos Huertas, SHELLEY Physical Therapy Assistant                      PT Assessment/Plan     Row Name 01/03/19 1500          PT Assessment    Functional Limitations  Limitations in functional capacity and performance;Performance in self-care ADL;Limitation in home management;Performance in work activities  -EM     Impairments  Range of motion;Pain;Muscle strength  -EM     Assessment Comments  Pt deja tx well with progressed therex regime. Pt demo good understanding of HEP.   -EM     Rehab Potential  Good  -EM     Patient/caregiver participated in establishment of treatment plan and goals  Yes  -EM     Patient would benefit from skilled therapy intervention  Yes  -EM        PT Plan    PT Frequency  2x/week  -EM     Predicted Duration of Therapy Intervention (Therapy Eval)  6 weeks  -EM     PT Plan Comments  Cont to progress shoulder/scap strengthening as deja. Add Lawnmower with TB next tx.   -EM       User Key  (r) = Recorded By, (t) = Taken By, (c) = Cosigned By    Initials Name Provider Type    EM Ramos Lake PTA Physical " Therapy Assistant          Modalities     Row Name 01/03/19 1500             ELECTRICAL STIMULATION    Attended/Unattended  Unattended  -EM      Stimulation Type  IFC w/ ice  -EM      Location/Electrode Placement/Other  R shoulder 15'  -EM        User Key  (r) = Recorded By, (t) = Taken By, (c) = Cosigned By    Initials Name Provider Type    EM Ramos Lake, PTA Physical Therapy Assistant          Exercises     Row Name 01/03/19 1500             Subjective Comments    Subjective Comments  Pt states she is hurting more this week, feels she may have over done it putting up hali decorations.  -EM         Subjective Pain    Able to rate subjective pain?  yes  -EM      Pre-Treatment Pain Level  4  -EM      Post-Treatment Pain Level  3  -EM         Exercise 1    Exercise Name 1  PRO II-AAROM UE/LE  -EM      Time 1  10'  -EM         Exercise 2    Exercise Name 2  3 Way Shoulder Pulley  -EM      Sets 2  1  -EM      Reps 2  30  -EM         Exercise 3    Exercise Name 3  Lat Pulls w/ TB  -EM      Sets 3  1  -EM      Reps 3  20  -EM      Additional Comments  Yellow  -EM         Exercise 4    Exercise Name 4  Scap Retraction w/ TB  -EM      Sets 4  1  -EM      Reps 4  20  -EM      Additional Comments  Yellow  -EM         Exercise 5    Exercise Name 5  6 Way Shoulder TB  -EM      Sets 5  1  -EM      Reps 5  20  -EM      Additional Comments  Yellow  -EM         Exercise 6    Exercise Name 6  D2 PNF Flexion w/ DB  -EM      Sets 6  1  -EM      Reps 6  20  -EM      Additional Comments  1# DB  -EM         Exercise 7    Exercise Name 7   Press w/ DB  -EM      Sets 7  1  -EM      Reps 7  20  -EM      Additional Comments  1# DB  -EM         Exercise 8    Exercise Name 8  Wall Taps w/ MB  -EM      Sets 8  1  -EM      Reps 8  10 Over and Back  -EM      Additional Comments  2# MB  -EM         Exercise 9    Exercise Name 9  PROM:   -EM      Time 9  11'  -EM         Exercise 10    Exercise Name 10  IFC w/ ice  -EM      Time 10   15'  -EM        User Key  (r) = Recorded By, (t) = Taken By, (c) = Cosigned By    Initials Name Provider Type    KEEGAN Ramos Lake, SHELLEY Physical Therapy Assistant                        Manual Rx (last 36 hours)      Manual Treatments     Row Name 01/03/19 1500             Manual Rx 1    Manual Rx 1 Location  R shoulder   -EM      Manual Rx 1 Type  PROM: Flex, Abd, IR, ER  -EM      Manual Rx 1 Duration  11'  -EM        User Key  (r) = Recorded By, (t) = Taken By, (c) = Cosigned By    Initials Name Provider Type    Ramos Huertas SHARON, SHELLEY Physical Therapy Assistant          PT OP Goals     Row Name 01/03/19 1500          PT Short Term Goals    STG Date to Achieve  12/25/18  -EM     STG 1  Patient will be independent in home exercise program  -EM     STG 1 Progress  Met  (Significant)   -EM     STG 2  Patient will have active shoulder flexion 140°  -EM     STG 2 Progress  Met  (Significant)   -EM     STG 3  Patient will have active shoulder abduction 110°  -EM     STG 3 Progress  Met  (Significant)   -EM     STG 4  Patient will have active external rotation to 70° at 90° abduction  -EM     STG 4 Progress  Not Met  -EM     STG 5   Patient will have internal rotation to 60° at 90° abduction  -EM     STG 5 Progress  Not Met  -EM     STG 6  Patient will have quick Dash score of 36% or less  -EM     STG 6 Progress  Met  (Significant)   -EM        Long Term Goals    LTG Date to Achieve  01/04/19  -EM     LTG 1  Patient will be able to report subjectively 75-80% overall improvement  -EM     LTG 1 Progress  Not Met  -EM     LTG 2  Patient be independent in final home exercise program  -EM     LTG 2 Progress  Not Met  -EM     LTG 3  Patient will have active shoulder flexion 160°  -EM     LTG 3 Progress  Not Met  -EM     LTG 4  Patient will have active shoulder abduction to 150°  -EM     LTG 4 Progress  Not Met  -EM     LTG 5  Patient will have quick Dash score of less than 20% deficit  -EM     LTG 5 Progress  Not Met  -EM      LTG 6  Patient will have met Patient goals of returning to work and put hair up in a ponytail  -EM     LTG 6 Progress  Not Met  -EM        Time Calculation    PT Goal Re-Cert Due Date  12/25/19  -EM       User Key  (r) = Recorded By, (t) = Taken By, (c) = Cosigned By    Initials Name Provider Type    EM Ramos Lake PTA Physical Therapy Assistant          Therapy Education  Education Details: Updated HEP issued w/ yellow TB: IR strap S, Scap REtraction TB, Lat Pulls TB, 6 way Shoulder TB, No Money TB  Given: HEP  Program: Progressed  How Provided: Verbal, Demonstration, Written  Provided to: Patient  Level of Understanding: Verbalized, Demonstrated              Time Calculation:   Start Time: 1506  Stop Time: 1618  Time Calculation (min): 72 min  Total Timed Code Minutes- PT: 72 minute(s)  Therapy Suggested Charges     Code   Minutes Charges    None           Therapy Charges for Today     Code Description Service Date Service Provider Modifiers Qty    96589284341 HC PT THER PROC EA 15 MIN 1/3/2019 Ramso Lake, PTA GP 4    80545015962 HC PT ELECTRICAL STIM UNATTENDED 1/3/2019 Ramos Lake, PTA  1                    Ramos Lake PTA  1/3/2019

## 2019-01-04 ENCOUNTER — HOSPITAL ENCOUNTER (OUTPATIENT)
Dept: PHYSICAL THERAPY | Facility: HOSPITAL | Age: 59
Setting detail: THERAPIES SERIES
Discharge: HOME | End: 2019-01-04

## 2019-01-04 DIAGNOSIS — S42.021D CLOSED DISPLACED FRACTURE OF SHAFT OF RIGHT CLAVICLE WITH ROUTINE HEALING, SUBSEQUENT ENCOUNTER: Primary | ICD-10-CM

## 2019-01-04 PROCEDURE — G0283 ELEC STIM OTHER THAN WOUND: HCPCS | Performed by: PHYSICAL THERAPIST

## 2019-01-04 PROCEDURE — 97110 THERAPEUTIC EXERCISES: CPT | Performed by: PHYSICAL THERAPIST

## 2019-01-04 NOTE — THERAPY PROGRESS REPORT/RE-CERT
Outpatient Physical Therapy Ortho Progress Note  [facility]     Patient Name: Belen Larios  : 1960  MRN: [id number]  Today's Date: 2019      Visit Date: 2019      Attendance    Authorized 30   Pre Rx pain 5   Post Rx pain 3   % improvement 75   MD follow up    Recert date            Visit Dx:    ICD-10-CM ICD-9-CM   1. Closed displaced fracture of shaft of right clavicle with routine healing, subsequent encounter S42.021D V54.11       Patient Active Problem List   Diagnosis   • Neck pain, acute   • Skin sensation disturbance   • Cervical pain (neck)   • Numbness and tingling   • Degenerative disc disease, cervical   • Pain of right clavicle   • Hypercholesterolemia   • History of pneumothorax   • Closed displaced fracture of shaft of right clavicle   • Multiple closed fractures of ribs of right side        Past Medical History:   Diagnosis Date   • Acute upper respiratory infection    • Allergic rhinitis    • Backache     Backache - possible IT band      • Cough    • Encounter for general adult medical examination without abnormal findings    • Fibrocystic breast    • Health maintenance examination     Adult health examination      • History of screening mammography     Screening mammography      • Hyperlipidemia    • Hyperthyroidism     Hyperthyroidism - hx of same     • Pharyngitis    • Rhinitis    • Sacroiliitis (CMS/HCC)     Sacroiliitis, not elsewhere classified      • Skin lesion     Skin lesion - lesion right cheek      • Upper respiratory infection    • Visit for gynecologic examination         Past Surgical History:   Procedure Laterality Date   • CERVICAL SPINE SURGERY  10/28/2010    Cervical spine disk surgery (1)   • CLAVICLE OPEN REDUCTION INTERNAL FIXATION Right 10/19/2018    Procedure: CLAVICLE OPEN REDUCTION INTERNAL FIXATION with iliac crest bone graft right side.     (C-ARM#3);  Surgeon: Phillip Grant MD;  Location: [facility];  Service:  "Orthopedics   • COLONOSCOPY  10/27/2011    Colonoscopy, diagnostic (screening) 73696 (1)      • ENDOSCOPY  03/05/2008    EGD w/ tube 09687 (1)    Hypopharynx, esophagus,NRL. 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 Multiple cold biop was obtained from the antrum. A single cold biop was obtained & placed on PyloriTek strip. Pylorus, duodenum NRL.    • INJECTION OF MEDICATION  12/08/2015    Kenalog (4)      • INJECTION OF MEDICATION  01/17/2015    Rocephin (2)      • INJECTION OF MEDICATION  01/17/2015    Solu-Medrol (1)     • PAP SMEAR  06/22/2015    WNL, CARD SENT, DR. RAMON'S OFFICE   • PROCEDURE GENERIC CONVERTED  02/18/2016    Consult, Dermatology (1)          PT Ortho     Row Name 01/04/19 0900       Subjective Comments    Subjective Comments  Pt states pain is increased today.  Just had therapy yesterday.  -DD       Precautions and Contraindications    Precautions  C6-7 fusion  -DD    Contraindications  \"Slowly progress as tolerated.\"  -DD       Subjective Pain    Able to rate subjective pain?  yes  -DD    Pre-Treatment Pain Level  5  -DD    Row Name 01/03/19 1500       Precautions and Contraindications    Precautions  C6-7 fusion  -EM    Contraindications  \"Slowly progress as tolerated.\"  (Significant)   -EM      User Key  (r) = Recorded By, (t) = Taken By, (c) = Cosigned By    Initials Name Provider Type    EM Ramos Lake, PTA Physical Therapy Assistant    DD Elly Elizondo, PT DPT Physical Therapist                      PT Assessment/Plan     Row Name 01/04/19 1041 01/03/19 1500       PT Assessment    Functional Limitations  Limitations in functional capacity and performance;Limitation in home management;Performance in work activities  -DD  Limitations in functional capacity and performance;Performance in self-care ADL;Limitation in home management;Performance in work activities  -EM    Impairments  Range of motion;Muscle strength  -DD  Range of motion;Pain;Muscle strength  -EM    Assessment Comments  Good " progress towards goals.  Painful when stressed overhead and rotation motions; Needs further ROM and Strengthening.  -DD  Pt deja tx well with progressed therex regime. Pt demo good understanding of HEP.   -EM    Please refer to paper survey for additional self-reported information  Yes  -DD  --    Rehab Potential  Good  -DD  Good  -EM    Patient/caregiver participated in establishment of treatment plan and goals  Yes  -DD  Yes  -EM    Patient would benefit from skilled therapy intervention  Yes  -DD  Yes  -EM       PT Plan    PT Frequency  2x/week  -DD  2x/week  -EM    Predicted Duration of Therapy Intervention (Therapy Eval)  3-4 weeks  -DD  6 weeks  -EM    Planned CPT's?  PT EVAL LOW COMPLEXITY: 25817;PT MANUAL THERAPY EA 15 MIN: 65972;PT THER PROC EA 15 MIN: 87909;PT ELECTRICAL STIM ATTD EA 15 MIN: 84447;PT HOT/COLD PACK WC NONMCARE: 47374  -DD  --    PT Plan Comments  progress scapular resistance and ROM.  Bent over rows. Clocks.  -DD  Cont to progress shoulder/scap strengthening as deja. Add Lawnmower with TB next tx.   -EM      User Key  (r) = Recorded By, (t) = Taken By, (c) = Cosigned By    Initials Name Provider Type    Ramos Huertas PTA Physical Therapy Assistant    Elly Eldridge, PT DPT Physical Therapist          Modalities     Row Name 01/04/19 0900 01/03/19 1500          Subjective Pain    Post-Treatment Pain Level  3  -DD  --        Ice    Ice Applied  Yes  -DD  --     Location  right shoulder  -DD  --     Rx Minutes  15 mins  -DD  --     Ice S/P Rx  Yes  -DD  --        ELECTRICAL STIMULATION    Attended/Unattended  Unattended  -DD  Unattended  -EM     Stimulation Type  IFC  -DD  IFC w/ ice  -EM     Max mAmp  8  -DD  --     Location/Electrode Placement/Other   right shoudler  -DD  R shoulder 15'  -EM       User Key  (r) = Recorded By, (t) = Taken By, (c) = Cosigned By    Initials Name Provider Type    Ramos Huertas PTA Physical Therapy Assistant    Elly Eldridge, PT DPT  "Physical Therapist          Exercises     Row Name 01/04/19 0930 01/04/19 0900 01/03/19 1500       Subjective Comments    Subjective Comments  --  Pt states pain is increased today.  Just had therapy yesterday.  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  -EM       Subjective Pain    Able to rate subjective pain?  --  yes  -DD  yes  -EM    Pre-Treatment Pain Level  --  5  -DD  4  -EM    Post-Treatment Pain Level  --  3  -DD  3  -EM       Exercise 1    Exercise Name 1  PRO II-AAROM UE/LE  -DD  --  PRO II-AAROM UE/LE  -EM    Time 1  10'  -DD  --  10'  -EM       Exercise 2    Exercise Name 2  3 Way Shoulder Pulley  -DD  --  3 Way Shoulder Pulley  -EM    Sets 2  1  -DD  --  1  -EM    Reps 2  30  -DD  --  30  -EM       Exercise 3    Exercise Name 3  IR strap stretch  -DD  --  Lat Pulls w/ TB  -EM    Sets 3  --  --  1  -EM    Reps 3  3/30\"  -DD  --  20  -EM    Additional Comments  --  --  Yellow  -EM       Exercise 4    Exercise Name 4  pendulums  -DD  --  Scap Retraction w/ TB  -EM    Sets 4  --  --  1  -EM    Reps 4  30  -DD  --  20  -EM    Additional Comments  --  --  Yellow  -EM       Exercise 5    Exercise Name 5  Joint mobes/ PROM/ MFR  -DD  --  6 Way Shoulder TB  -EM    Sets 5  --  --  1  -EM    Reps 5  --  --  20  -EM    Time 5  15  -DD  --  --    Additional Comments  --  --  Yellow  -EM       Exercise 6    Exercise Name 6  --  --  D2 PNF Flexion w/ DB  -EM    Sets 6  --  --  1  -EM    Reps 6  --  --  20  -EM    Additional Comments  --  --  1# DB  -EM       Exercise 7    Exercise Name 7  --  --   Press w/ DB  -EM    Sets 7  --  --  1  -EM    Reps 7  --  --  20  -EM    Additional Comments  --  --  1# DB  -EM       Exercise 8    Exercise Name 8  --  --  Wall Taps w/ MB  -EM    Sets 8  --  --  1  -EM    Reps 8  --  --  10 Over and Back  -EM    Additional Comments  --  --  2# MB  -EM       Exercise 9    Exercise Name 9  --  --  PROM:   -EM    " Time 9  --  --  11'  -EM       Exercise 10    Exercise Name 10  --  --  IFC w/ ice  -EM    Time 10  --  --  15'  -EM      User Key  (r) = Recorded By, (t) = Taken By, (c) = Cosigned By    Initials Name Provider Type    Ramos Huertas, SHELLEY Physical Therapy Assistant    Elly Eldridge, PT DPT Physical Therapist                        Manual Rx (last 36 hours)      Manual Treatments     Row Name 01/03/19 1500             Manual Rx 1    Manual Rx 1 Location  R shoulder   -EM      Manual Rx 1 Type  PROM: Flex, Abd, IR, ER  -EM      Manual Rx 1 Duration  11'  -EM        User Key  (r) = Recorded By, (t) = Taken By, (c) = Cosigned By    Initials Name Provider Type    Ramos Huertas PTA Physical Therapy Assistant          PT OP Goals     Row Name 01/04/19 1043 01/04/19 0930       PT Short Term Goals    STG Date to Achieve  --  12/25/18  -DD    STG 1  --  Patient will be independent in home exercise program  -DD    STG 1 Progress  --  Met  -DD    STG 2  --  Patient will have active shoulder flexion 140°  -DD    STG 2 Progress  --  Met  -DD    STG 3  --  Patient will have active shoulder abduction 110°  -DD    STG 3 Progress  --  Met  -DD    STG 4  --  Patient will have active external rotation to 70° at 90° abduction  -DD    STG 4 Progress  --  Progressing  -DD    STG 4 Progress Comments  --  65  -DD    STG 5  --   Patient will have internal rotation to 60° at 90° abduction  -DD    STG 5 Progress  --  Progressing  -DD    STG 5 Progress Comments  --  58  -DD    STG 6  --  Patient will have quick Dash score of 36% or less  -DD    STG 6 Progress  --  Met  -DD       Long Term Goals    LTG Date to Achieve  --  01/04/19  -DD    LTG 1  --  Patient will be able to report subjectively 75-80% overall improvement  -DD    LTG 1 Progress  --  Met  -DD    LTG 1 Progress Comments  --  75%  -DD    LTG 2  --  Patient be independent in final home exercise program  -DD    LTG 2 Progress  --  Progressing  -DD    LTG 3  --   Patient will have active shoulder flexion 160°  -DD    LTG 3 Progress  --  Met  -DD    LTG 4  --  Patient will have active shoulder abduction to 150°  -DD    LTG 4 Progress  --  Progressing  -DD    LTG 4 Progress Comments  --  142  -DD    LTG 5  --  Patient will have quick Dash score of less than 20% deficit  -DD    LTG 5 Progress  --  Met  -DD    LTG 5 Progress Comments  --  20%  -DD    LTG 6  --  Patient will have met Patient goals of returning to work and put hair up in a ponytail  -DD    LTG 6 Progress  --  Met  -DD       Time Calculation    PT Goal Re-Cert Due Date  01/25/19  -DD  --    Row Name 01/03/19 1500          PT Short Term Goals    STG Date to Achieve  12/25/18  -EM     STG 1  Patient will be independent in home exercise program  -EM     STG 1 Progress  Met  (Significant)   -EM     STG 2  Patient will have active shoulder flexion 140°  -EM     STG 2 Progress  Met  (Significant)   -EM     STG 3  Patient will have active shoulder abduction 110°  -EM     STG 3 Progress  Met  (Significant)   -EM     STG 4  Patient will have active external rotation to 70° at 90° abduction  -EM     STG 4 Progress  Not Met  -EM     STG 5   Patient will have internal rotation to 60° at 90° abduction  -EM     STG 5 Progress  Not Met  -EM     STG 6  Patient will have quick Dash score of 36% or less  -EM     STG 6 Progress  Met  (Significant)   -EM        Long Term Goals    LTG Date to Achieve  01/04/19  -EM     LTG 1  Patient will be able to report subjectively 75-80% overall improvement  -EM     LTG 1 Progress  Not Met  -EM     LTG 2  Patient be independent in final home exercise program  -EM     LTG 2 Progress  Not Met  -EM     LTG 3  Patient will have active shoulder flexion 160°  -EM     LTG 3 Progress  Not Met  -EM     LTG 4  Patient will have active shoulder abduction to 150°  -EM     LTG 4 Progress  Not Met  -EM     LTG 5  Patient will have quick Dash score of less than 20% deficit  -EM     LTG 5 Progress  Not Met  -EM      LTG 6  Patient will have met Patient goals of returning to work and put hair up in a ponytail  -EM     LTG 6 Progress  Not Met  -EM        Time Calculation    PT Goal Re-Cert Due Date  12/25/19  -EM       User Key  (r) = Recorded By, (t) = Taken By, (c) = Cosigned By    Initials Name Provider Type    EM Ramos Lake, PTA Physical Therapy Assistant    DD Elly Elizondo, PT DPT Physical Therapist               Outcome Measure Options: Quick DASH  Quick DASH  Open a tight or new jar.: Mild Difficulty  Do heavy household chores (e.g., wash walls, wash floors): Mild Difficulty  Carry a shopping bag or briefcase: No Difficulty  Wash your back: Mild Difficulty  Use a knife to cut food: No Difficulty  Recreational activities in which you take some force or impact through your arm, should or hand (e.g. golf, hammering, tennis, etc.): Moderate Difficulty  During the past week, to what extent has your arm, shoulder, or hand problem interfered with your normal social activites with family, friends, neighbors or groups?: Slightly  During the past week, were you limited in your work or other regular daily activities as a result of your arm, shoulder or hand problem?: Slightly Limited  Arm, Shoulder, or hand pain: Mild  Tingling (pins and needles) in your arm, shoulder, or hand: None  During the past week, how much difficulty have you had sleeping because of the pain in your arm, shoulder or hand?: Mild Difficulty  Number of Questions Answered: 11  Quick DASH Score: 20.45         Time Calculation:   Start Time: 0930  Stop Time: 1035  Time Calculation (min): 65 min  Total Timed Code Minutes- PT: 47 minute(s)  Therapy Suggested Charges     Code   Minutes Charges    None           Therapy Charges for Today     Code Description Service Date Service Provider Modifiers Qty    69734949980 HC PT ELECTRICAL STIM UNATTENDED 1/4/2019 Elly Elizondo, PT DPT  1    86255447478 HC PT THER PROC EA 15 MIN 1/4/2019  Elly Elizondo, PT DPT GP 3          PT G-Codes  Outcome Measure Options: Quick DASH  Quick DASH Score: 20.45         Elly Elizondo, PT DPT, ATC  1/4/2019

## 2019-01-08 ENCOUNTER — HOSPITAL ENCOUNTER (OUTPATIENT)
Dept: PHYSICAL THERAPY | Facility: HOSPITAL | Age: 59
Setting detail: THERAPIES SERIES
Discharge: HOME | End: 2019-01-08

## 2019-01-08 DIAGNOSIS — S42.021D CLOSED DISPLACED FRACTURE OF SHAFT OF RIGHT CLAVICLE WITH ROUTINE HEALING, SUBSEQUENT ENCOUNTER: Primary | ICD-10-CM

## 2019-01-08 PROCEDURE — 97110 THERAPEUTIC EXERCISES: CPT | Performed by: PHYSICAL THERAPIST

## 2019-01-08 PROCEDURE — 97110 THERAPEUTIC EXERCISES: CPT

## 2019-01-08 NOTE — THERAPY TREATMENT NOTE
Outpatient Physical Therapy Ortho Treatment Note  [facility]     Patient Name: Belen Larios  : 1960  MRN: [id number]  Today's Date: 2019      Visit Date: 2019   Attendance: 10/10 (27 Remain)  Subjective improvement: 75%  Recert: 19  MD Appointment: 19      Visit Dx:    ICD-10-CM ICD-9-CM   1. Closed displaced fracture of shaft of right clavicle with routine healing, subsequent encounter S42.021D V54.11       Patient Active Problem List   Diagnosis   • Neck pain, acute   • Skin sensation disturbance   • Cervical pain (neck)   • Numbness and tingling   • Degenerative disc disease, cervical   • Pain of right clavicle   • Hypercholesterolemia   • History of pneumothorax   • Closed displaced fracture of shaft of right clavicle   • Multiple closed fractures of ribs of right side        Past Medical History:   Diagnosis Date   • Acute upper respiratory infection    • Allergic rhinitis    • Backache     Backache - possible IT band      • Cough    • Encounter for general adult medical examination without abnormal findings    • Fibrocystic breast    • Health maintenance examination     Adult health examination      • History of screening mammography     Screening mammography      • Hyperlipidemia    • Hyperthyroidism     Hyperthyroidism - hx of same     • Pharyngitis    • Rhinitis    • Sacroiliitis (CMS/HCC)     Sacroiliitis, not elsewhere classified      • Skin lesion     Skin lesion - lesion right cheek      • Upper respiratory infection    • Visit for gynecologic examination         Past Surgical History:   Procedure Laterality Date   • CERVICAL SPINE SURGERY  10/28/2010    Cervical spine disk surgery (1)   • CLAVICLE OPEN REDUCTION INTERNAL FIXATION Right 10/19/2018    Procedure: CLAVICLE OPEN REDUCTION INTERNAL FIXATION with iliac crest bone graft right side.     (C-ARM#3);  Surgeon: Phillip Grant MD;  Location: [facility];  Service: Orthopedics   • COLONOSCOPY   "10/27/2011    Colonoscopy, diagnostic (screening) 44369 (1)      • ENDOSCOPY  03/05/2008    EGD w/ tube 59334 (1)    Hypopharynx, esophagus,NRL. 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 Multiple cold biop was obtained from the antrum. A single cold biop was obtained & placed on PyloriTek strip. Pylorus, duodenum NRL.    • INJECTION OF MEDICATION  12/08/2015    Kenalog (4)      • INJECTION OF MEDICATION  01/17/2015    Rocephin (2)      • INJECTION OF MEDICATION  01/17/2015    Solu-Medrol (1)     • PAP SMEAR  06/22/2015    WNL, CARD SENT, DR. RAMON'S OFFICE   • PROCEDURE GENERIC CONVERTED  02/18/2016    Consult, Dermatology (1)          PT Ortho     Row Name 01/08/19 0700       Precautions and Contraindications    Precautions  C6-7 fusion  -EM    Contraindications  \"Slowly progress as tolerated.\"  (Significant)   -EM      User Key  (r) = Recorded By, (t) = Taken By, (c) = Cosigned By    Initials Name Provider Type    Ramos Huertas PTA Physical Therapy Assistant                      PT Assessment/Plan     Row Name 01/08/19 0800          PT Assessment    Functional Limitations  Limitations in functional capacity and performance;Limitation in home management;Performance in work activities  -EM     Impairments  Range of motion;Muscle strength  -EM     Assessment Comments  Pt deja tx well with progressing therex regime.   -EM     Rehab Potential  Good  -EM     Patient/caregiver participated in establishment of treatment plan and goals  Yes  -EM     Patient would benefit from skilled therapy intervention  Yes  -EM        PT Plan    PT Frequency  2x/week  -EM     Predicted Duration of Therapy Intervention (Therapy Eval)  3-4 weeks  -EM     PT Plan Comments  Add 3 Way scap stab w/ TB next tx. Progress to red TB as indicated.   -EM       User Key  (r) = Recorded By, (t) = Taken By, (c) = Cosigned By    Initials Name Provider Type    Ramos Huertas PTA Physical Therapy Assistant          Modalities     Row Name 01/08/19 0700    "          Ice    Patient denies application of Ice  Yes and IFC  -EM      Patient reports will apply ice at home to involved area  Yes  -EM        User Key  (r) = Recorded By, (t) = Taken By, (c) = Cosigned By    Initials Name Provider Type    EM Ramos Lake, PTA Physical Therapy Assistant          Exercises     Row Name 01/08/19 0700             Precautions    Existing Precautions/Restrictions  -- go slow  -DD         Subjective Comments    Subjective Comments  Doing OK today  -DD         Subjective Pain    Able to rate subjective pain?  yes  -DD      Pre-Treatment Pain Level  2  -DD      Post-Treatment Pain Level  4  -EM         Exercise 1    Exercise Name 1  PRO II-AAROM UE/LE  -DD      Time 1  10'  -DD         Exercise 2    Exercise Name 2  3 Way Shoulder Pulley  -DD      Sets 2  1  -DD      Reps 2  30  -DD         Exercise 3    Exercise Name 3  IR strap stretch  -DD      Reps 3  3'  -DD         Exercise 4    Exercise Name 4  Scap Retraction w/ TB  -DD      Sets 4  1  -DD      Reps 4  20  -DD      Additional Comments  yellow  -DD         Exercise 5    Exercise Name 5  Lat Pulls w/ TB  -EM      Sets 5  1  -EM      Reps 5  30  -EM      Additional Comments  Yellow  -EM         Exercise 6    Exercise Name 6  6 Way Shoulder TB  -EM      Sets 6  1  -EM      Reps 6  30  -EM      Additional Comments  Yellow  -EM         Exercise 7    Exercise Name 7  Wall Taps w/ MB  -EM      Sets 7  1  -EM      Reps 7  20 Over and Back  -EM      Time 7  '  -EM      Additional Comments  2# MB  -EM         Exercise 8    Exercise Name 8  Wall Push Up: Regular/Kassie  -EM      Sets 8  1  -EM      Reps 8  30  -EM         Exercise 9    Exercise Name 9   Press w/ DB  -EM      Sets 9  1  -EM      Reps 9  30  -EM      Additional Comments  2# DB  -EM         Exercise 10    Exercise Name 10  Lawnmower w/ TB  -EM      Sets 10  1  -EM      Reps 10  30  -EM      Additional Comments  Red  -EM         Exercise 11    Exercise Name 11  PROM   -EM      Time 11  7'  -EM         Exercise 12    Exercise Name 12  Declines ice/elev  -EM        User Key  (r) = Recorded By, (t) = Taken By, (c) = Cosigned By    Initials Name Provider Type    EM Ramos Lake, PTA Physical Therapy Assistant    Elly Eldridge, PT DPT Physical Therapist                        Manual Rx (last 36 hours)      Manual Treatments     Row Name 01/08/19 0700             Manual Rx 1    Manual Rx 1 Location  R shoulder   -EM      Manual Rx 1 Type  PROM: Flex, Abd, IR, ER  -EM      Manual Rx 1 Duration  7'  -EM        User Key  (r) = Recorded By, (t) = Taken By, (c) = Cosigned By    Initials Name Provider Type    EM Ramos Lake, SHELLEY Physical Therapy Assistant          PT OP Goals     Row Name 01/08/19 0800          PT Short Term Goals    STG Date to Achieve  12/25/18  -EM     STG 1  Patient will be independent in home exercise program  -EM     STG 1 Progress  Met  (Significant)   -EM     STG 2  Patient will have active shoulder flexion 140°  -EM     STG 2 Progress  Met  (Significant)   -EM     STG 3  Patient will have active shoulder abduction 110°  -EM     STG 3 Progress  Met  (Significant)   -EM     STG 4  Patient will have active external rotation to 70° at 90° abduction  -EM     STG 4 Progress  Progressing  -EM     STG 5   Patient will have internal rotation to 60° at 90° abduction  -EM     STG 5 Progress  Progressing  -EM     STG 6  Patient will have quick Dash score of 36% or less  -EM     STG 6 Progress  Met  (Significant)   -EM        Long Term Goals    LTG Date to Achieve  01/04/19  -EM     LTG 1  Patient will be able to report subjectively 75-80% overall improvement  -EM     LTG 1 Progress  Met  (Significant)   -EM     LTG 2  Patient be independent in final home exercise program  -EM     LTG 2 Progress  Progressing  -EM     LTG 3  Patient will have active shoulder flexion 160°  -EM     LTG 3 Progress  Met  (Significant)   -EM     LTG 4  Patient will have active  shoulder abduction to 150°  -EM     LTG 4 Progress  Progressing  -EM     LTG 5  Patient will have quick Dash score of less than 20% deficit  -EM     LTG 5 Progress  Met  (Significant)   -EM     LTG 6  Patient will have met Patient goals of returning to work and put hair up in a ponytail  -EM     LTG 6 Progress  Met  (Significant)   -EM        Time Calculation    PT Goal Re-Cert Due Date  01/25/19  -EM       User Key  (r) = Recorded By, (t) = Taken By, (c) = Cosigned By    Initials Name Provider Type    EM Ramos Lake PTA Physical Therapy Assistant                         Time Calculation:   Start Time: 0738  Stop Time: 0834  Time Calculation (min): 56 min  Total Timed Code Minutes- PT: 56 minute(s)  Therapy Suggested Charges     Code   Minutes Charges    None           Therapy Charges for Today     Code Description Service Date Service Provider Modifiers Qty    82742504211 HC PT THER PROC EA 15 MIN 1/8/2019 Ramos Lake PTA GP 2                    Ramos Lake PTA  1/8/2019

## 2019-01-11 ENCOUNTER — HOSPITAL ENCOUNTER (OUTPATIENT)
Dept: PHYSICAL THERAPY | Facility: HOSPITAL | Age: 59
Setting detail: THERAPIES SERIES
Discharge: HOME | End: 2019-01-11

## 2019-01-11 DIAGNOSIS — S42.021D CLOSED DISPLACED FRACTURE OF SHAFT OF RIGHT CLAVICLE WITH ROUTINE HEALING, SUBSEQUENT ENCOUNTER: Primary | ICD-10-CM

## 2019-01-11 PROCEDURE — 97110 THERAPEUTIC EXERCISES: CPT | Performed by: PHYSICAL THERAPIST

## 2019-01-11 PROCEDURE — 97110 THERAPEUTIC EXERCISES: CPT

## 2019-01-11 NOTE — THERAPY TREATMENT NOTE
Outpatient Physical Therapy Ortho Treatment Note  [facility]     Patient Name: Belen Larios  : 1960  MRN: [id number]  Today's Date: 2019      Visit Date: 2019   Attendance:  (26 remain)  Subjective improvement: 75%  Recert: 19  MD Appointment: 19      Visit Dx:    ICD-10-CM ICD-9-CM   1. Closed displaced fracture of shaft of right clavicle with routine healing, subsequent encounter S42.021D V54.11       Patient Active Problem List   Diagnosis   • Neck pain, acute   • Skin sensation disturbance   • Cervical pain (neck)   • Numbness and tingling   • Degenerative disc disease, cervical   • Pain of right clavicle   • Hypercholesterolemia   • History of pneumothorax   • Closed displaced fracture of shaft of right clavicle   • Multiple closed fractures of ribs of right side        Past Medical History:   Diagnosis Date   • Acute upper respiratory infection    • Allergic rhinitis    • Backache     Backache - possible IT band      • Cough    • Encounter for general adult medical examination without abnormal findings    • Fibrocystic breast    • Health maintenance examination     Adult health examination      • History of screening mammography     Screening mammography      • Hyperlipidemia    • Hyperthyroidism     Hyperthyroidism - hx of same     • Pharyngitis    • Rhinitis    • Sacroiliitis (CMS/HCC)     Sacroiliitis, not elsewhere classified      • Skin lesion     Skin lesion - lesion right cheek      • Upper respiratory infection    • Visit for gynecologic examination         Past Surgical History:   Procedure Laterality Date   • CERVICAL SPINE SURGERY  10/28/2010    Cervical spine disk surgery (1)   • CLAVICLE OPEN REDUCTION INTERNAL FIXATION Right 10/19/2018    Procedure: CLAVICLE OPEN REDUCTION INTERNAL FIXATION with iliac crest bone graft right side.     (C-ARM#3);  Surgeon: Phillip Grant MD;  Location: [facility];  Service: Orthopedics   • COLONOSCOPY   "10/27/2011    Colonoscopy, diagnostic (screening) 57064 (1)      • ENDOSCOPY  03/05/2008    EGD w/ tube 28631 (1)    Hypopharynx, esophagus,NRL. 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 Multiple cold biop was obtained from the antrum. A single cold biop was obtained & placed on PyloriTek strip. Pylorus, duodenum NRL.    • INJECTION OF MEDICATION  12/08/2015    Kenalog (4)      • INJECTION OF MEDICATION  01/17/2015    Rocephin (2)      • INJECTION OF MEDICATION  01/17/2015    Solu-Medrol (1)     • PAP SMEAR  06/22/2015    WNL, CARD SENT, DR. RAMON'S OFFICE   • PROCEDURE GENERIC CONVERTED  02/18/2016    Consult, Dermatology (1)          PT Ortho     Row Name 01/11/19 0737       Precautions and Contraindications    Precautions  C6-7 fusion  -EM    Contraindications  \"Slowly progress as tolerated.\"  -EM       Subjective Pain    Post-Treatment Pain Level  4  -EM      User Key  (r) = Recorded By, (t) = Taken By, (c) = Cosigned By    Initials Name Provider Type    EM Ramos Lake, PTA Physical Therapy Assistant                      PT Assessment/Plan     Row Name 01/11/19 0800          PT Assessment    Functional Limitations  Limitations in functional capacity and performance;Limitation in home management;Performance in work activities  -EM     Impairments  Range of motion;Muscle strength  -EM     Assessment Comments  Pt deja tx well with progressed resistance with therex. Pt cont to progress as expected at this time. Pt doing well with work activities.   -EM     Rehab Potential  Good  -EM     Patient/caregiver participated in establishment of treatment plan and goals  Yes  -EM     Patient would benefit from skilled therapy intervention  Yes  -EM        PT Plan    PT Frequency  2x/week  -EM     Predicted Duration of Therapy Intervention (Therapy Eval)  3-4 wks  -EM     PT Plan Comments  ROM measurements next tx. Cont to progress as deja, focus on [place] activities.   -EM       User Key  (r) = Recorded By, (t) = Taken By, (c) = Cosigned " "By    Initials Name Provider Type    EM Ramos Lake, SHELLEY Physical Therapy Assistant              Exercises     Row Name 01/11/19 0737             Precautions    Existing Precautions/Restrictions  -- progress slowly  -DD         Subjective Comments    Subjective Comments  Has had a good week at work  -DD         Subjective Pain    Able to rate subjective pain?  yes  -DD      Pre-Treatment Pain Level  3  -DD      Post-Treatment Pain Level  4  -EM         Exercise 1    Exercise Name 1  PRO II-AAROM UE/LE  -DD      Time 1  10'  -DD      Additional Comments  L4  -DD         Exercise 2    Exercise Name 2  3 Way Shoulder Pulley  -DD      Sets 2  1  -DD      Reps 2  30  -DD         Exercise 3    Exercise Name 3  IR strap stretch  -DD      Reps 3  3'  -DD         Exercise 4    Exercise Name 4  Scap Retraction   -EM      Sets 4  1  -EM      Reps 4  30  -EM      Additional Comments  Green  -EM         Exercise 5    Exercise Name 5  Lat Pulls w/ TB  -EM      Sets 5  1  -EM      Reps 5  30  -EM      Additional Comments  Green  -EM         Exercise 6    Exercise Name 6  ER strap stretch added new  -DD      Sets 6  --  -DD      Reps 6  5  -DD      Time 6  10 sec  -DD      Additional Comments  Green: Ext, Add, IR  ;   Red: Flex, Abd, ER  -EM         Exercise 7    Exercise Name 7  pec stretches  -DD      Sets 7  --  -DD      Reps 7  3/30\"  -DD      Time 7  --  -DD         Exercise 8    Exercise Name 8  Shoulder TB: Ext, add, IR  -EM      Sets 8  1  -EM      Reps 8  30  -EM      Additional Comments  Green  -EM         Exercise 9    Exercise Name 9  Shoulder TB: Flex, Abd, ER  -EM      Sets 9  1  -EM      Reps 9  30  -EM      Additional Comments  Red  -EM         Exercise 10    Exercise Name 10  Wall Taps w/ MB  -EM      Sets 10  1  -EM      Reps 10  15  -EM      Additional Comments  3# MB  -EM         Exercise 11    Exercise Name 11  3 Way Scap Stab w/ TB  -EM      Sets 11  1  -EM      Reps 11  30  -EM      Time 11  --  -DD      " Additional Comments  Red  -EM         Exercise 12    Exercise Name 12  PROM  -EM      Time 12  6'  -EM        User Key  (r) = Recorded By, (t) = Taken By, (c) = Cosigned By    Initials Name Provider Type    EM Ramos Lake, PTA Physical Therapy Assistant    Elly Eldridge, PT DPT Physical Therapist                        Manual Rx (last 36 hours)      Manual Treatments     Row Name 01/11/19 0700             Manual Rx 1    Manual Rx 1 Location  R shoulder   -EM      Manual Rx 1 Type  PROM: Flex, Abd, IR, ER  -EM      Manual Rx 1 Duration  6'  -EM        User Key  (r) = Recorded By, (t) = Taken By, (c) = Cosigned By    Initials Name Provider Type    EM Ramos Lake, SHELLEY Physical Therapy Assistant          PT OP Goals     Row Name 01/11/19 0800          PT Short Term Goals    STG Date to Achieve  12/25/18  -EM     STG 1  Patient will be independent in home exercise program  -EM     STG 1 Progress  Met  (Significant)   -EM     STG 2  Patient will have active shoulder flexion 140°  -EM     STG 2 Progress  Met  (Significant)   -EM     STG 3  Patient will have active shoulder abduction 110°  -EM     STG 3 Progress  Met  (Significant)   -EM     STG 4  Patient will have active external rotation to 70° at 90° abduction  -EM     STG 4 Progress  Progressing  -EM     STG 5   Patient will have internal rotation to 60° at 90° abduction  -EM     STG 5 Progress  Progressing  -EM     STG 6  Patient will have quick Dash score of 36% or less  -EM     STG 6 Progress  Met  (Significant)   -EM        Long Term Goals    LTG Date to Achieve  01/04/19  -EM     LTG 1  Patient will be able to report subjectively 75-80% overall improvement  -EM     LTG 1 Progress  Met  (Significant)   -EM     LTG 2  Patient be independent in final home exercise program  -EM     LTG 2 Progress  Progressing  -EM     LTG 3  Patient will have active shoulder flexion 160°  -EM     LTG 3 Progress  Met  (Significant)   -EM     LTG 4  Patient will have  active shoulder abduction to 150°  -EM     LTG 4 Progress  Progressing  -EM     LTG 5  Patient will have quick Dash score of less than 20% deficit  -EM     LTG 5 Progress  Met  (Significant)   -EM     LTG 6  Patient will have met Patient goals of returning to work and put hair up in a ponytail  -EM     LTG 6 Progress  Met  (Significant)   -EM        Time Calculation    PT Goal Re-Cert Due Date  01/25/19  -EM       User Key  (r) = Recorded By, (t) = Taken By, (c) = Cosigned By    Initials Name Provider Type    EM Ramos Lake PTA Physical Therapy Assistant          Therapy Education  Education Details: Red TB issued for HEP progression  Given: HEP  Program: Progressed  Provided to: Patient              Time Calculation:   Start Time: 0737  Stop Time: 0838  Time Calculation (min): 61 min  Total Timed Code Minutes- PT: 61 minute(s)  Therapy Suggested Charges     Code   Minutes Charges    None           Therapy Charges for Today     Code Description Service Date Service Provider Modifiers Qty    23270350283 HC PT THER PROC EA 15 MIN 1/11/2019 Ramos Lake PTA GP 2                    Ramos Lake PTA  1/11/2019

## 2019-01-15 ENCOUNTER — HOSPITAL ENCOUNTER (OUTPATIENT)
Dept: PHYSICAL THERAPY | Facility: HOSPITAL | Age: 59
Setting detail: THERAPIES SERIES
Discharge: HOME | End: 2019-01-15

## 2019-01-15 DIAGNOSIS — S42.021D CLOSED DISPLACED FRACTURE OF SHAFT OF RIGHT CLAVICLE WITH ROUTINE HEALING, SUBSEQUENT ENCOUNTER: Primary | ICD-10-CM

## 2019-01-15 PROCEDURE — 97110 THERAPEUTIC EXERCISES: CPT | Performed by: PHYSICAL THERAPIST

## 2019-01-15 NOTE — THERAPY TREATMENT NOTE
Outpatient Physical Therapy Ortho Treatment Note  [facility]     Patient Name: Belen Larios  : 1960  MRN: [id number]  Today's Date: 1/15/2019      Visit Date: 01/15/2019      Attendance    Authorized 25   Pre Rx pain 2   Post Rx pain 4   % improvement 75   MD follow up    Recert date            Visit Dx:    ICD-10-CM ICD-9-CM   1. Closed displaced fracture of shaft of right clavicle with routine healing, subsequent encounter S42.021D V54.11       Patient Active Problem List   Diagnosis   • Neck pain, acute   • Skin sensation disturbance   • Cervical pain (neck)   • Numbness and tingling   • Degenerative disc disease, cervical   • Pain of right clavicle   • Hypercholesterolemia   • History of pneumothorax   • Closed displaced fracture of shaft of right clavicle   • Multiple closed fractures of ribs of right side        Past Medical History:   Diagnosis Date   • Acute upper respiratory infection    • Allergic rhinitis    • Backache     Backache - possible IT band      • Cough    • Encounter for general adult medical examination without abnormal findings    • Fibrocystic breast    • Health maintenance examination     Adult health examination      • History of screening mammography     Screening mammography      • Hyperlipidemia    • Hyperthyroidism     Hyperthyroidism - hx of same     • Pharyngitis    • Rhinitis    • Sacroiliitis (CMS/HCC)     Sacroiliitis, not elsewhere classified      • Skin lesion     Skin lesion - lesion right cheek      • Upper respiratory infection    • Visit for gynecologic examination         Past Surgical History:   Procedure Laterality Date   • CERVICAL SPINE SURGERY  10/28/2010    Cervical spine disk surgery (1)   • CLAVICLE OPEN REDUCTION INTERNAL FIXATION Right 10/19/2018    Procedure: CLAVICLE OPEN REDUCTION INTERNAL FIXATION with iliac crest bone graft right side.     (C-ARM#3);  Surgeon: Phillip Grant MD;  Location: [facility];  Service:  Orthopedics   • COLONOSCOPY  10/27/2011    Colonoscopy, diagnostic (screening) 05267 (1)      • ENDOSCOPY  03/05/2008    EGD w/ tube 37256 (1)    Hypopharynx, esophagus,NRL. 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 Multiple cold biop was obtained from the antrum. A single cold biop was obtained & placed on PyloriTek strip. Pylorus, duodenum NRL.    • INJECTION OF MEDICATION  12/08/2015    Kenalog (4)      • INJECTION OF MEDICATION  01/17/2015    Rocephin (2)      • INJECTION OF MEDICATION  01/17/2015    Solu-Medrol (1)     • PAP SMEAR  06/22/2015    WNL, CARD SENT, DR. RAMON'S OFFICE   • PROCEDURE GENERIC CONVERTED  02/18/2016    Consult, Dermatology (1)          PT Ortho     Row Name 01/15/19 0700       Subjective Pain    Able to rate subjective pain?  yes  -DD    Pre-Treatment Pain Level  2  -DD      User Key  (r) = Recorded By, (t) = Taken By, (c) = Cosigned By    Initials Name Provider Type    Elly Eldridge, PT DPT Physical Therapist                      PT Assessment/Plan     Row Name 01/15/19 1012          PT Assessment    Assessment Comments  tolerates exercise, noted fatigue and increase pain. Progress as tolerates. May need Ice with progression of strengthening.  -DD     Rehab Potential  Good  -DD     Patient/caregiver participated in establishment of treatment plan and goals  Yes  -DD     Patient would benefit from skilled therapy intervention  Yes  -DD        PT Plan    PT Frequency  2x/week  -DD     Predicted Duration of Therapy Intervention (Therapy Eval)  3 weeks  -DD     PT Plan Comments  Progression of Overhead strengthening, Place and hold isometrics multiple angles. Ice.  -DD       User Key  (r) = Recorded By, (t) = Taken By, (c) = Cosigned By    Initials Name Provider Type    Elly Eldridge, PT DPT Physical Therapist              Exercises     Row Name 01/15/19 0734 01/15/19 0700          Subjective Pain    Able to rate subjective pain?  --  yes  -DD     Pre-Treatment Pain Level  --   2  -DD     Post-Treatment Pain Level  --  4  -DD        Exercise 1    Exercise Name 1  PRO II-AAROM UE/LE  -DD  --     Time 1  10'  -DD  --        Exercise 2    Exercise Name 2  3 Way Shoulder Pulley  -DD  --     Sets 2  1  -DD  --     Reps 2  30  -DD  --        Exercise 3    Exercise Name 3  --  -DD  --     Reps 3  --  -DD  --        Exercise 4    Exercise Name 4  angled overhead wand flexion/ chest press  -DD  --     Additional Comments  4#/5#  -DD  --        Exercise 5    Exercise Name 5  angled PREs flexion / diagonal  -DD  --     Reps 5  20  -DD  --     Additional Comments  2#  -DD  --        Exercise 6    Exercise Name 6  horz add  -DD  --     Sets 6  2  -DD  --     Reps 6  10  -DD  --     Additional Comments  2#  -DD  --        Exercise 7    Exercise Name 7  Prone ITY  -DD  --     Sets 7  2  -DD  --     Reps 7  10  -DD  --     Additional Comments  1# except Y  -DD  --        Exercise 8    Exercise Name 8  joint mobes  -DD  --     Time 8  7'  -DD  --        Exercise 9    Exercise Name 9  serratus punches  -DD  --     Reps 9  20  -DD  --     Additional Comments  3#  -DD  --       User Key  (r) = Recorded By, (t) = Taken By, (c) = Cosigned By    Initials Name Provider Type    DD Elly Elizondo, PT DPT Physical Therapist          Time Calculation:   Start Time: 0734  Stop Time: 0815  Time Calculation (min): 41 min  Total Timed Code Minutes- PT: 41 minute(s)  Therapy Suggested Charges     Code   Minutes Charges    None           Therapy Charges for Today     Code Description Service Date Service Provider Modifiers Qty    [id number]  PT THER PROC EA 15 MIN 1/15/2019 Elly Elizondo, PT DPT GP 3        Elly Elizondo PT DPT  1/15/2019

## 2019-01-18 ENCOUNTER — HOSPITAL ENCOUNTER (OUTPATIENT)
Dept: PHYSICAL THERAPY | Facility: HOSPITAL | Age: 59
Setting detail: THERAPIES SERIES
Discharge: HOME | End: 2019-01-18

## 2019-01-18 DIAGNOSIS — S42.021D CLOSED DISPLACED FRACTURE OF SHAFT OF RIGHT CLAVICLE WITH ROUTINE HEALING, SUBSEQUENT ENCOUNTER: Primary | ICD-10-CM

## 2019-01-18 PROCEDURE — 97110 THERAPEUTIC EXERCISES: CPT | Performed by: PHYSICAL THERAPIST

## 2019-01-18 NOTE — THERAPY TREATMENT NOTE
Outpatient Physical Therapy Ortho Treatment Note  [facility]     Patient Name: Belen Larios  : 1960  MRN: [id number]  Today's Date: 2019      Visit Date: 2019      Attendance    Authorized 25   Pre Rx pain 3   Post Rx pain 3   % improvement 75   MD follow up    Recert date            Visit Dx:    ICD-10-CM ICD-9-CM   1. Closed displaced fracture of shaft of right clavicle with routine healing, subsequent encounter S42.021D V54.11       Patient Active Problem List   Diagnosis   • Neck pain, acute   • Skin sensation disturbance   • Cervical pain (neck)   • Numbness and tingling   • Degenerative disc disease, cervical   • Pain of right clavicle   • Hypercholesterolemia   • History of pneumothorax   • Closed displaced fracture of shaft of right clavicle   • Multiple closed fractures of ribs of right side        Past Medical History:   Diagnosis Date   • Acute upper respiratory infection    • Allergic rhinitis    • Backache     Backache - possible IT band      • Cough    • Encounter for general adult medical examination without abnormal findings    • Fibrocystic breast    • Health maintenance examination     Adult health examination      • History of screening mammography     Screening mammography      • Hyperlipidemia    • Hyperthyroidism     Hyperthyroidism - hx of same     • Pharyngitis    • Rhinitis    • Sacroiliitis (CMS/HCC)     Sacroiliitis, not elsewhere classified      • Skin lesion     Skin lesion - lesion right cheek      • Upper respiratory infection    • Visit for gynecologic examination         Past Surgical History:   Procedure Laterality Date   • CERVICAL SPINE SURGERY  10/28/2010    Cervical spine disk surgery (1)   • CLAVICLE OPEN REDUCTION INTERNAL FIXATION Right 10/19/2018    Procedure: CLAVICLE OPEN REDUCTION INTERNAL FIXATION with iliac crest bone graft right side.     (C-ARM#3);  Surgeon: Phillip Grant MD;  Location: [facility];  Service:  Orthopedics   • COLONOSCOPY  10/27/2011    Colonoscopy, diagnostic (screening) 82997 (1)      • ENDOSCOPY  03/05/2008    EGD w/ tube 66649 (1)    Hypopharynx, esophagus,NRL. 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 Multiple cold biop was obtained from the antrum. A single cold biop was obtained & placed on PyloriTek strip. Pylorus, duodenum NRL.    • INJECTION OF MEDICATION  12/08/2015    Kenalog (4)      • INJECTION OF MEDICATION  01/17/2015    Rocephin (2)      • INJECTION OF MEDICATION  01/17/2015    Solu-Medrol (1)     • PAP SMEAR  06/22/2015    WNL, CARD SENT, DR. RAMON'S OFFICE   • PROCEDURE GENERIC CONVERTED  02/18/2016    Consult, Dermatology (1)          PT Ortho     Row Name 01/18/19 0700       General ROM    GENERAL ROM COMMENTS  ER 90, IR 70 Passive  -DD      User Key  (r) = Recorded By, (t) = Taken By, (c) = Cosigned By    Initials Name Provider Type    Elly Eldridge, PT DPT Physical Therapist                      PT Assessment/Plan     Row Name 01/18/19 0739          PT Assessment    Functional Limitations  Performance in work activities;Limitation in home management  -DD     Impairments  Pain;Range of motion;Muscle strength  -DD     Assessment Comments  Some muscle soreness after last session. resolved as it should. Progress with strengthening and ROM.  -DD     Rehab Potential  Good  -DD     Patient/caregiver participated in establishment of treatment plan and goals  Yes  -DD     Patient would benefit from skilled therapy intervention  Yes  -DD        PT Plan    PT Frequency  2x/week  -DD     Predicted Duration of Therapy Intervention (Therapy Eval)  3 weeks  -DD     PT Plan Comments  Overhead strengthening. Place and hold isometrics, joint mobes and passive stretches. Ice  -DD       User Key  (r) = Recorded By, (t) = Taken By, (c) = Cosigned By    Initials Name Provider Type    Elly Eldridge, PT DPT Physical Therapist              Exercises     Row Name 01/18/19 0730 01/18/19 0700     "      Subjective Comments    Subjective Comments  --  Sore after Tuesday work out the next day. but OK now.  -DD        Subjective Pain    Able to rate subjective pain?  --  yes  -DD     Pre-Treatment Pain Level  --  3  -DD        Exercise 1    Exercise Name 1  PRO II-AAROM UE/LE  -DD  --     Time 1  10'  -DD  --     Additional Comments  twin peaks L5  -DD  --        Exercise 2    Exercise Name 2  3 Way Shoulder Pulley  -DD  --     Sets 2  1  -DD  --     Reps 2  30  -DD  --     Additional Comments  2 # cuff wt  -DD  --        Exercise 3    Exercise Name 3  supine CP  -DD  --     Reps 3  30  -DD  --     Additional Comments  5# wand  -DD  --        Exercise 4    Exercise Name 4  st overhead wand flexion/   -DD  --     Reps 4  30  -DD  --     Additional Comments  4#  -DD  --        Exercise 5    Exercise Name 5  angled PREs flexion / diagonal  -DD  --     Reps 5  20  -DD  --     Additional Comments  2#  -DD  --        Exercise 6    Exercise Name 6  horz add  -DD  --     Sets 6  2  -DD  --     Reps 6  10  -DD  --     Additional Comments  2#  -DD  --        Exercise 7    Exercise Name 7  Prone ITY  -DD  --     Sets 7  2  -DD  --     Reps 7  10  -DD  --     Additional Comments  1# EXCEPT Y  -DD  --        Exercise 8    Exercise Name 8  joint mobes  -DD  --     Time 8  7'  -DD  --        Exercise 9    Exercise Name 9  serratus punches  -DD  --     Reps 9  20  -DD  --     Additional Comments  3#  -DD  --        Exercise 10    Exercise Name 10  Sleeper stretch  -DD  --     Reps 10  2/20\"  -DD  --       User Key  (r) = Recorded By, (t) = Taken By, (c) = Cosigned By    Initials Name Provider Type    DD Elly Elizondo, PT DPT Physical Therapist              PT OP Goals     Row Name 01/18/19 0730          PT Short Term Goals    STG Date to Achieve  12/25/18  -DD     STG 1  Patient will be independent in home exercise program  -DD     STG 1 Progress  Met  -DD     STG 2  Patient will have active shoulder flexion 140°  -DD  "    STG 2 Progress  Met  -DD     STG 3  Patient will have active shoulder abduction 110°  -DD     STG 3 Progress  Met  -DD     STG 4  Patient will have active external rotation to 70° at 90° abduction  -DD     STG 4 Progress  Progressing  -DD     STG 5   Patient will have internal rotation to 60° at 90° abduction  -DD     STG 5 Progress  Progressing  -DD     STG 6  Patient will have quick Dash score of 36% or less  -DD     STG 6 Progress  Met  -DD        Long Term Goals    LTG Date to Achieve  01/04/19  -DD     LTG 1  Patient will be able to report subjectively 75-80% overall improvement  -DD     LTG 1 Progress  Met  -DD     LTG 2  Patient be independent in final home exercise program  -DD     LTG 2 Progress  Progressing  -DD     LTG 3  Patient will have active shoulder flexion 160°  -DD     LTG 3 Progress  Met  -DD     LTG 4  Patient will have active shoulder abduction to 150°  -DD     LTG 4 Progress  Progressing  -DD     LTG 5  Patient will have quick Dash score of less than 20% deficit  -DD     LTG 5 Progress  Met  -DD     LTG 6  Patient will have met Patient goals of returning to work and put hair up in a ponytail  -DD     LTG 6 Progress  Met  -DD       User Key  (r) = Recorded By, (t) = Taken By, (c) = Cosigned By    Initials Name Provider Type    DD Elly Elizondo PT DPT Physical Therapist            Time Calculation:   Start Time: 0732  Stop Time: 0815  Time Calculation (min): 43 min  Total Timed Code Minutes- PT: 43 minute(s)  Therapy Suggested Charges     Code   Minutes Charges    None           Therapy Charges for Today     Code Description Service Date Service Provider Modifiers Qty    [id number] HC PT THER PROC EA 15 MIN 1/18/2019 Elly Elizondo PT DPT GP 3            Elly Elizondo PT DPT, ATC  1/18/2019

## 2019-01-22 ENCOUNTER — HOSPITAL ENCOUNTER (OUTPATIENT)
Dept: PHYSICAL THERAPY | Facility: HOSPITAL | Age: 59
Setting detail: THERAPIES SERIES
Discharge: HOME | End: 2019-01-22

## 2019-01-22 DIAGNOSIS — S42.021D CLOSED DISPLACED FRACTURE OF SHAFT OF RIGHT CLAVICLE WITH ROUTINE HEALING, SUBSEQUENT ENCOUNTER: Primary | ICD-10-CM

## 2019-01-22 PROCEDURE — 97110 THERAPEUTIC EXERCISES: CPT | Performed by: PHYSICAL THERAPIST

## 2019-01-22 NOTE — THERAPY TREATMENT NOTE
Outpatient Physical Therapy Ortho Treatment Note  [facility]     Patient Name: Belen Larios  : 1960  MRN: [id number]  Today's Date: 2019      Visit Date: 2019      Attendance    Authorized 18 left   Pre Rx pain 2-3   Post Rx pain 2-3   % improvement 85   MD follow up    Recert date            Visit Dx:    ICD-10-CM ICD-9-CM   1. Closed displaced fracture of shaft of right clavicle with routine healing, subsequent encounter S42.021D V54.11       Patient Active Problem List   Diagnosis   • Neck pain, acute   • Skin sensation disturbance   • Cervical pain (neck)   • Numbness and tingling   • Degenerative disc disease, cervical   • Pain of right clavicle   • Hypercholesterolemia   • History of pneumothorax   • Closed displaced fracture of shaft of right clavicle   • Multiple closed fractures of ribs of right side        Past Medical History:   Diagnosis Date   • Acute upper respiratory infection    • Allergic rhinitis    • Backache     Backache - possible IT band      • Cough    • Encounter for general adult medical examination without abnormal findings    • Fibrocystic breast    • Health maintenance examination     Adult health examination      • History of screening mammography     Screening mammography      • Hyperlipidemia    • Hyperthyroidism     Hyperthyroidism - hx of same     • Pharyngitis    • Rhinitis    • Sacroiliitis (CMS/HCC)     Sacroiliitis, not elsewhere classified      • Skin lesion     Skin lesion - lesion right cheek      • Upper respiratory infection    • Visit for gynecologic examination         Past Surgical History:   Procedure Laterality Date   • CERVICAL SPINE SURGERY  10/28/2010    Cervical spine disk surgery (1)   • CLAVICLE OPEN REDUCTION INTERNAL FIXATION Right 10/19/2018    Procedure: CLAVICLE OPEN REDUCTION INTERNAL FIXATION with iliac crest bone graft right side.     (C-ARM#3);  Surgeon: Phillip Grant MD;  Location: [facility] OR;   Service: Orthopedics   • COLONOSCOPY  10/27/2011    Colonoscopy, diagnostic (screening) 95393 (1)      • ENDOSCOPY  03/05/2008    EGD w/ tube 84590 (1)    Hypopharynx, esophagus,NRL. 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 Multiple cold biop was obtained from the antrum. A single cold biop was obtained & placed on PyloriTek strip. Pylorus, duodenum NRL.    • INJECTION OF MEDICATION  12/08/2015    Kenalog (4)      • INJECTION OF MEDICATION  01/17/2015    Rocephin (2)      • INJECTION OF MEDICATION  01/17/2015    Solu-Medrol (1)     • PAP SMEAR  06/22/2015    WNL, CARD SENT, DR. RAMON'S OFFICE   • PROCEDURE GENERIC CONVERTED  02/18/2016    Consult, Dermatology (1)            supine passive ER 80, flexion 170              PT Assessment/Plan     Row Name 01/22/19 0744          PT Assessment    Assessment Comments  Pt does well today. Continue s to make imporvements and progress exercise without  increase in symptoms.  -DD     Rehab Potential  Good  -DD     Patient/caregiver participated in establishment of treatment plan and goals  Yes  -DD     Patient would benefit from skilled therapy intervention  Yes  -DD        PT Plan    PT Frequency  2x/week  -DD     Predicted Duration of Therapy Intervention (Therapy Eval)  2 weeks  -DD     PT Plan Comments  REcert next visist.  continue overhead strengthening. Joint mobes and stretches. Ice PRN.  -DD       User Key  (r) = Recorded By, (t) = Taken By, (c) = Cosigned By    Initials Name Provider Type    DD Elly Elizondo, PT DPT Physical Therapist              Exercises     Row Name 01/22/19 0731             Subjective Comments    Subjective Comments  Not fond of the sleeper stretch  -DD         Subjective Pain    Able to rate subjective pain?  yes  -DD      Pre-Treatment Pain Level  3  -DD         Exercise 1    Exercise Name 1  PRO II-AAROM UE/LE  -DD      Time 1  10'  -DD      Additional Comments  twin peacks L5  -DD         Exercise 2    Exercise Name 2  3 Way Shoulder Pulley   "-DD      Sets 2  1  -DD      Reps 2  30  -DD      Additional Comments  2# cuff   -DD         Exercise 3    Exercise Name 3  supine CP  -DD      Reps 3  30  -DD      Additional Comments  5#  -DD         Exercise 4    Exercise Name 4  st overhead wand flexion/   -DD      Reps 4  30  -DD      Additional Comments  4#  -DD         Exercise 5    Exercise Name 5  angled PREs flexion / diagonal  -DD      Reps 5  20  -DD      Additional Comments  2#  -DD         Exercise 6    Exercise Name 6  horz add  -DD      Sets 6  2  -DD      Reps 6  10  -DD      Additional Comments  2#  -DD         Exercise 7    Exercise Name 7  Prone ITY  -DD      Sets 7  2  -DD      Reps 7  10  -DD      Additional Comments  1# except Y  -DD         Exercise 8    Exercise Name 8  joint mobes  -DD      Time 8  6'  -DD         Exercise 9    Exercise Name 9  serratus punches  -DD      Reps 9  20  -DD      Additional Comments  3#  -DD         Exercise 10    Exercise Name 10  Sleeper stretch  -DD      Reps 10  2/20\"  -DD         Exercise 11    Exercise Name 11  st shoulder ext   -DD      Reps 11  20  -DD      Additional Comments  4# bar  -DD         Exercise 12    Exercise Name 12  SL ER  -DD      Reps 12  20  -DD      Additional Comments  2#  -DD        User Key  (r) = Recorded By, (t) = Taken By, (c) = Cosigned By    Initials Name Provider Type    Elly Eldridge, PT DPT Physical Therapist                         PT OP Goals     Row Name 01/22/19 0731          PT Short Term Goals    STG Date to Achieve  12/25/18  -DD     STG 1  Patient will be independent in home exercise program  -DD     STG 1 Progress  Met  -DD     STG 2  Patient will have active shoulder flexion 140°  -DD     STG 2 Progress  Met  -DD     STG 3  Patient will have active shoulder abduction 110°  -DD     STG 3 Progress  Met  -DD     STG 4  Patient will have active external rotation to 70° at 90° abduction  -DD     STG 4 Progress  Progressing  -DD     STG 5   Patient will have " internal rotation to 60° at 90° abduction  -DD     STG 5 Progress  Progressing  -DD     STG 6  Patient will have quick Dash score of 36% or less  -DD     STG 6 Progress  Met  -DD        Long Term Goals    LTG Date to Achieve  01/04/19  -DD     LTG 1  Patient will be able to report subjectively 75-80% overall improvement  -DD     LTG 1 Progress  Met  -DD     LTG 2  Patient be independent in final home exercise program  -DD     LTG 2 Progress  Progressing  -DD     LTG 3  Patient will have active shoulder flexion 160°  -DD     LTG 3 Progress  Met  -DD     LTG 4  Patient will have active shoulder abduction to 150°  -DD     LTG 4 Progress  Progressing  -DD     LTG 5  Patient will have quick Dash score of less than 20% deficit  -DD     LTG 5 Progress  Met  -DD     LTG 6  Patient will have met Patient goals of returning to work and put hair up in a ponytail  -DD     LTG 6 Progress  Met  -DD       User Key  (r) = Recorded By, (t) = Taken By, (c) = Cosigned By    Initials Name Provider Type    DD Elly Elizondo PT DPT Physical Therapist             Time Calculation:   Start Time: 0731  Stop Time: 0814  Time Calculation (min): 43 min  Total Timed Code Minutes- PT: 43 minute(s)  Therapy Suggested Charges     Code   Minutes Charges    None           Therapy Charges for Today     Code Description Service Date Service Provider Modifiers Qty    14189959633 HC PT THER PROC EA 15 MIN 1/22/2019 Elly Elizondo PT DPT GP 2            Elly Elizondo PT DPT, ATC  1/22/2019

## 2019-01-25 ENCOUNTER — HOSPITAL ENCOUNTER (OUTPATIENT)
Dept: PHYSICAL THERAPY | Facility: HOSPITAL | Age: 59
Setting detail: THERAPIES SERIES
Discharge: HOME | End: 2019-01-25

## 2019-01-25 DIAGNOSIS — S42.021D CLOSED DISPLACED FRACTURE OF SHAFT OF RIGHT CLAVICLE WITH ROUTINE HEALING, SUBSEQUENT ENCOUNTER: Primary | ICD-10-CM

## 2019-01-25 PROCEDURE — 97110 THERAPEUTIC EXERCISES: CPT | Performed by: PHYSICAL THERAPIST

## 2019-01-25 NOTE — THERAPY PROGRESS REPORT/RE-CERT
Outpatient Physical Therapy Ortho Progress Note  [facility]     Patient Name: Belen Larios  : 1960  MRN: [id number]  Today's Date: 2019      Visit Date: 2019      Attendance 15/15   Authorized 17 left   Pre Rx pain 0   Post Rx pain 3   % improvement 85-90   MD follow up    Recert date 2/`15           Visit Dx:    ICD-10-CM ICD-9-CM   1. Closed displaced fracture of shaft of right clavicle with routine healing, subsequent encounter S42.021D V54.11       Patient Active Problem List   Diagnosis   • Neck pain, acute   • Skin sensation disturbance   • Cervical pain (neck)   • Numbness and tingling   • Degenerative disc disease, cervical   • Pain of right clavicle   • Hypercholesterolemia   • History of pneumothorax   • Closed displaced fracture of shaft of right clavicle   • Multiple closed fractures of ribs of right side        Past Medical History:   Diagnosis Date   • Acute upper respiratory infection    • Allergic rhinitis    • Backache     Backache - possible IT band      • Cough    • Encounter for general adult medical examination without abnormal findings    • Fibrocystic breast    • Health maintenance examination     Adult health examination      • History of screening mammography     Screening mammography      • Hyperlipidemia    • Hyperthyroidism     Hyperthyroidism - hx of same     • Pharyngitis    • Rhinitis    • Sacroiliitis (CMS/HCC)     Sacroiliitis, not elsewhere classified      • Skin lesion     Skin lesion - lesion right cheek      • Upper respiratory infection    • Visit for gynecologic examination         Past Surgical History:   Procedure Laterality Date   • CERVICAL SPINE SURGERY  10/28/2010    Cervical spine disk surgery (1)   • CLAVICLE OPEN REDUCTION INTERNAL FIXATION Right 10/19/2018    Procedure: CLAVICLE OPEN REDUCTION INTERNAL FIXATION with iliac crest bone graft right side.     (C-ARM#3);  Surgeon: Phillip Grant MD;  Location: [facility] OR;   Service: Orthopedics   • COLONOSCOPY  10/27/2011    Colonoscopy, diagnostic (screening) 38793 (1)      • ENDOSCOPY  03/05/2008    EGD w/ tube 46527 (1)    Hypopharynx, esophagus,NRL. 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 Multiple cold biop was obtained from the antrum. A single cold biop was obtained & placed on PyloriTek strip. Pylorus, duodenum NRL.    • INJECTION OF MEDICATION  12/08/2015    Kenalog (4)      • INJECTION OF MEDICATION  01/17/2015    Rocephin (2)      • INJECTION OF MEDICATION  01/17/2015    Solu-Medrol (1)     • PAP SMEAR  06/22/2015    WNL, CARD SENT, DR. RAMON'S OFFICE   • PROCEDURE GENERIC CONVERTED  02/18/2016    Consult, Dermatology (1)            Objective see Goal attainment              PT Assessment/Plan     Row Name 01/25/19 0817          PT Assessment    Functional Limitations  Performance in work activities;Limitation in home management  -DD     Impairments  Muscle strength;Range of motion  -DD     Assessment Comments  Pt makes good progress toward ROM goals today.  Working on more strengthening. and end ROM.  -DD     Please refer to paper survey for additional self-reported information  Yes  -DD     Rehab Potential  Good  -DD     Patient/caregiver participated in establishment of treatment plan and goals  Yes  -DD     Patient would benefit from skilled therapy intervention  Yes  -DD        PT Plan    PT Frequency  2x/week  -DD     Predicted Duration of Therapy Intervention (Therapy Eval)  2 weeks  -DD     Physical Therapy Interventions (Optional Details)  ROM (Range of Motion);strengthening;stretching  -DD     PT Plan Comments  Continue over head work, and capsular stretches. Strengthening. Ice.  -DD       User Key  (r) = Recorded By, (t) = Taken By, (c) = Cosigned By    Initials Name Provider Type    DD Elly Elizondo, PT DPT Physical Therapist              Exercises     Row Name 01/25/19 0732 01/25/19 0700          Subjective Comments    Subjective Comments  --  First time since  "injury that pain has been 0.  -DD        Subjective Pain    Able to rate subjective pain?  --  yes  -DD     Pre-Treatment Pain Level  --  0  -DD        Exercise 1    Exercise Name 1  PRO II-AAROM UE/LE  -DD  --     Time 1  10'  -DD  --        Exercise 2    Exercise Name 2  3 Way Shoulder Pulley  -DD  --     Sets 2  1  -DD  --     Reps 2  30  -DD  --     Additional Comments  2# cuff  -DD  --        Exercise 3    Exercise Name 3  supine CP  -DD  --     Reps 3  30  -DD  --     Additional Comments  5#  -DD  --        Exercise 4    Exercise Name 4  st overhead wand flexion/   -DD  --     Reps 4  30  -DD  --     Additional Comments  5#  -DD  --        Exercise 5    Exercise Name 5  angled PREs flexion / diagonal  -DD  --     Reps 5  20  -DD  --     Additional Comments  2#  -DD  --        Exercise 6    Exercise Name 6  horz add  -DD  --     Sets 6  2  -DD  --     Reps 6  10  -DD  --        Exercise 7    Exercise Name 7  Prone ITY  -DD  --     Sets 7  2  -DD  --     Reps 7  10  -DD  --     Additional Comments  1#  -DD  --        Exercise 8    Exercise Name 8  joint mobes/PROM  -DD  --     Time 8  6'  -DD  --        Exercise 9    Exercise Name 9  serratus punches  -DD  --     Reps 9  20  -DD  --     Additional Comments  5#  -DD  --        Exercise 10    Exercise Name 10  strap IR stretch  -DD  --     Reps 10  2/20\"  -DD  --        Exercise 11    Exercise Name 11  --  -DD  --     Reps 11  --  -DD  --     Additional Comments  --  -DD  --        Exercise 12    Exercise Name 12  --  -DD  --     Reps 12  --  -DD  --     Additional Comments  --  -DD  --       User Key  (r) = Recorded By, (t) = Taken By, (c) = Cosigned By    Initials Name Provider Type    Elly Eldridge, PT DPT Physical Therapist                         PT OP Goals     Row Name 01/25/19 0758 01/25/19 0732       PT Short Term Goals    STG Date to Achieve  --  12/25/18  -DD    STG 1  --  Patient will be independent in home exercise program  -DD    STG 1 " Progress  --  Met  -DD    STG 2  --  Patient will have active shoulder flexion 140°  -DD    STG 2 Progress  --  Met  -DD    STG 3  --  Patient will have active shoulder abduction 110°  -DD    STG 3 Progress  --  Met  -DD    STG 4  --  Patient will have active external rotation to 70° at 90° abduction  -DD    STG 4 Progress  --  Met  -DD    STG 4 Progress Comments  --  80  -DD    STG 5  --   Patient will have internal rotation to 60° at 90° abduction  -DD    STG 5 Progress  --  Met  -DD    STG 5 Progress Comments  --  65  -DD    STG 6  --  Patient will have quick Dash score of 36% or less  -DD    STG 6 Progress  --  Met  -DD       Long Term Goals    LTG Date to Achieve  --  01/04/19  -DD    LTG 1  --  Patient will be able to report subjectively 75-80% overall improvement  -DD    LTG 1 Progress  --  Met  -DD    LTG 1 Progress Comments  --  85-90  -DD    LTG 2  --  Patient be independent in final home exercise program  -DD    LTG 2 Progress  --  Progressing  -DD    LTG 3  --  Patient will have active shoulder flexion 160°  -DD    LTG 3 Progress  --  Partially Met  -DD    LTG 3 Progress Comments  --  supine 163, not standing  -DD    LTG 4  --  Patient will have active shoulder abduction to 150°  -DD    LTG 4 Progress  --  Partially Met  -DD    LTG 4 Progress Comments  --  supine AROM. not standing  -DD    LTG 5  --  Patient will have quick Dash score of less than 20% deficit  -DD    LTG 5 Progress  --  Met  -DD    LTG 5 Progress Comments  --  9%  -DD    LTG 6  --  Patient will have met Patient goals of returning to work and put hair up in a ponytail  -DD    LTG 6 Progress  --  Met  -DD       Time Calculation    PT Goal Re-Cert Due Date  02/15/19  -DD  --      User Key  (r) = Recorded By, (t) = Taken By, (c) = Cosigned By    Initials Name Provider Type    Elly Eldridge, PT DPT Physical Therapist               Outcome Measure Options: Quick DASH  Quick DASH  Open a tight or new jar.: No Difficulty  Do heavy  household chores (e.g., wash walls, wash floors): No Difficulty  Carry a shopping bag or briefcase: No Difficulty  Wash your back: Mild Difficulty  Use a knife to cut food: No Difficulty  Recreational activities in which you take some force or impact through your arm, should or hand (e.g. golf, hammering, tennis, etc.): Mild Difficulty  During the past week, to what extent has your arm, shoulder, or hand problem interfered with your normal social activites with family, friends, neighbors or groups?: Not at all  During the past week, were you limited in your work or other regular daily activities as a result of your arm, shoulder or hand problem?: Slightly Limited  Arm, Shoulder, or hand pain: Mild  Tingling (pins and needles) in your arm, shoulder, or hand: None  During the past week, how much difficulty have you had sleeping because of the pain in your arm, shoulder or hand?: No difficulty  Number of Questions Answered: 11  Quick DASH Score: 9.09         Time Calculation:   Start Time: 0731  Stop Time: 0812  Time Calculation (min): 41 min  Total Timed Code Minutes- PT: 41 minute(s)  Therapy Suggested Charges     Code   Minutes Charges    None           Therapy Charges for Today     Code Description Service Date Service Provider Modifiers Qty    16133179246 HC PT THER PROC EA 15 MIN 1/25/2019 Elly Elizondo PT DPT GP 3          PT G-Codes  Outcome Measure Options: Quick DASH  Quick DASH Score: 9.09         Elly Elizondo PT DPT  1/25/2019

## 2019-01-29 ENCOUNTER — HOSPITAL ENCOUNTER (OUTPATIENT)
Dept: PHYSICAL THERAPY | Facility: HOSPITAL | Age: 59
Setting detail: THERAPIES SERIES
Discharge: HOME | End: 2019-01-29

## 2019-01-29 DIAGNOSIS — S42.021D CLOSED DISPLACED FRACTURE OF SHAFT OF RIGHT CLAVICLE WITH ROUTINE HEALING, SUBSEQUENT ENCOUNTER: Primary | ICD-10-CM

## 2019-01-29 PROCEDURE — 97110 THERAPEUTIC EXERCISES: CPT | Performed by: PHYSICAL THERAPIST

## 2019-01-29 NOTE — THERAPY TREATMENT NOTE
Outpatient Physical Therapy Ortho Treatment Note  [facility]     Patient Name: Belen Larios  : 1960  MRN: [id number]  Today's Date: 2019      Visit Date: 2019        Attendance    Authorized 16 left   Pre Rx pain 2   Post Rx pain 2   % improvement 85-90   MD follow up    Recert date 2/15         Visit Dx:    ICD-10-CM ICD-9-CM   1. Closed displaced fracture of shaft of right clavicle with routine healing, subsequent encounter S42.021D V54.11       Patient Active Problem List   Diagnosis   • Neck pain, acute   • Skin sensation disturbance   • Cervical pain (neck)   • Numbness and tingling   • Degenerative disc disease, cervical   • Pain of right clavicle   • Hypercholesterolemia   • History of pneumothorax   • Closed displaced fracture of shaft of right clavicle   • Multiple closed fractures of ribs of right side        Past Medical History:   Diagnosis Date   • Acute upper respiratory infection    • Allergic rhinitis    • Backache     Backache - possible IT band      • Cough    • Encounter for general adult medical examination without abnormal findings    • Fibrocystic breast    • Health maintenance examination     Adult health examination      • History of screening mammography     Screening mammography      • Hyperlipidemia    • Hyperthyroidism     Hyperthyroidism - hx of same     • Pharyngitis    • Rhinitis    • Sacroiliitis (CMS/HCC)     Sacroiliitis, not elsewhere classified      • Skin lesion     Skin lesion - lesion right cheek      • Upper respiratory infection    • Visit for gynecologic examination         Past Surgical History:   Procedure Laterality Date   • CERVICAL SPINE SURGERY  10/28/2010    Cervical spine disk surgery (1)   • CLAVICLE OPEN REDUCTION INTERNAL FIXATION Right 10/19/2018    Procedure: CLAVICLE OPEN REDUCTION INTERNAL FIXATION with iliac crest bone graft right side.     (C-ARM#3);  Surgeon: Phillip Grant MD;  Location: [facility] OR;   Service: Orthopedics   • COLONOSCOPY  10/27/2011    Colonoscopy, diagnostic (screening) 45742 (1)      • ENDOSCOPY  03/05/2008    EGD w/ tube 04185 (1)    Hypopharynx, esophagus,NRL. 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 Multiple cold biop was obtained from the antrum. A single cold biop was obtained & placed on PyloriTek strip. Pylorus, duodenum NRL.    • INJECTION OF MEDICATION  12/08/2015    Kenalog (4)      • INJECTION OF MEDICATION  01/17/2015    Rocephin (2)      • INJECTION OF MEDICATION  01/17/2015    Solu-Medrol (1)     • PAP SMEAR  06/22/2015    WNL, CARD SENT, DR. RAMON'S OFFICE   • PROCEDURE GENERIC CONVERTED  02/18/2016    Consult, Dermatology (1)              Objective: IR 72 supine at 90 abd.            PT Assessment/Plan     Row Name 01/29/19 0772          PT Assessment    Assessment Comments  Good progress with strength and ROM. Still can't reach bra strap.  -DD     Please refer to paper survey for additional self-reported information  Yes  -DD     Rehab Potential  Good  -DD     Patient/caregiver participated in establishment of treatment plan and goals  Yes  -DD     Patient would benefit from skilled therapy intervention  Yes  -DD        PT Plan    PT Frequency  2x/week  -DD     Predicted Duration of Therapy Intervention (Therapy Eval)  1-2 weeks  -DD     Planned CPT's?  PT THER PROC EA 15 MIN: 04633;PT MANUAL THERAPY EA 15 MIN: 30304  -DD     Physical Therapy Interventions (Optional Details)  manual therapy techniques;home exercise program;strengthening;stretching;ROM (Range of Motion)  -DD     PT Plan Comments  Porgress motion and strength, Ice PRN.  -DD       User Key  (r) = Recorded By, (t) = Taken By, (c) = Cosigned By    Initials Name Provider Type    DD Elly Elizondo, PT DPT Physical Therapist              Exercises     Row Name 01/29/19 1224             Precautions    Existing Precautions/Restrictions  no known precautions/restrictions  -DD         Subjective Comments    Subjective  Comments  doing well  -DD         Subjective Pain    Able to rate subjective pain?  yes  -DD      Pre-Treatment Pain Level  2  -DD         Exercise 1    Exercise Name 1  PRO II-AAROM UE only  -DD      Time 1  10'  -DD      Additional Comments  LE 5 fwd/bkd  -DD         Exercise 2    Exercise Name 2  3 Way Shoulder Pulley  -DD      Sets 2  1  -DD      Reps 2  30  -DD      Additional Comments  2# cuff  -DD         Exercise 3    Exercise Name 3  supine CP  -DD      Reps 3  30  -DD      Additional Comments  5#  -DD         Exercise 4    Exercise Name 4  st overhead wand flexion/   -DD      Reps 4  20  -DD      Additional Comments  53  -DD         Exercise 5    Exercise Name 5  angled PREs flexion / diagonal  -DD      Reps 5  20  -DD      Additional Comments  2#  -DD         Exercise 6    Exercise Name 6  horz add  -DD      Sets 6  2  -DD      Reps 6  10  -DD      Additional Comments  2#  -DD         Exercise 7    Exercise Name 7  Prone ITY  -DD      Sets 7  2  -DD      Reps 7  10  -DD      Additional Comments  1#  -DD         Exercise 8    Exercise Name 8  joint mobes/PROM  -DD      Time 8  3'  -DD         Exercise 9    Exercise Name 9  reverse corners  -DD      Reps 9  20  -DD         Exercise 10    Exercise Name 10  wall push ups  -DD      Reps 10  20   -DD         Exercise 11    Exercise Name 11  press downs  -DD      Reps 11  20  -DD         Exercise 12    Exercise Name 12  SL ER  -DD      Reps 12  20  -DD      Additional Comments  2#  -DD        User Key  (r) = Recorded By, (t) = Taken By, (c) = Cosigned By    Initials Name Provider Type    Elly Eldridge, PT DPT Physical Therapist              PT OP Goals     Row Name 01/29/19 0730          PT Short Term Goals    STG Date to Achieve  12/25/18  -DD     STG 1  Patient will be independent in home exercise program  -DD     STG 1 Progress  Met  -DD     STG 2  Patient will have active shoulder flexion 140°  -DD     STG 2 Progress  Met  -DD     STG 3  Patient  will have active shoulder abduction 110°  -DD     STG 3 Progress  Met  -DD     STG 4  Patient will have active external rotation to 70° at 90° abduction  -DD     STG 4 Progress  Met  -DD     STG 5   Patient will have internal rotation to 60° at 90° abduction  -DD     STG 5 Progress  Met  -DD     STG 6  Patient will have quick Dash score of 36% or less  -DD     STG 6 Progress  Met  -DD        Long Term Goals    LTG Date to Achieve  01/04/19  -DD     LTG 1  Patient will be able to report subjectively 75-80% overall improvement  -DD     LTG 1 Progress  Met  -DD     LTG 2  Patient be independent in final home exercise program  -DD     LTG 2 Progress  Progressing  -DD     LTG 3  Patient will have active shoulder flexion 160°  -DD     LTG 3 Progress  Partially Met  -DD     LTG 4  Patient will have active shoulder abduction to 150°  -DD     LTG 4 Progress  Partially Met  -DD     LTG 5  Patient will have quick Dash score of less than 20% deficit  -DD     LTG 5 Progress  Met  -DD     LTG 6  Patient will have met Patient goals of returning to work and put hair up in a ponytail  -DD     LTG 6 Progress  Met  -DD       User Key  (r) = Recorded By, (t) = Taken By, (c) = Cosigned By    Initials Name Provider Type    DD Elly Elizondo PT DPT Physical Therapist          Time Calculation:   Start Time: 0730  Stop Time: 0808  Time Calculation (min): 38 min  Total Timed Code Minutes- PT: 38 minute(s)  Therapy Suggested Charges     Code   Minutes Charges    None           Therapy Charges for Today     Code Description Service Date Service Provider Modifiers Qty    23544552143 HC PT THER PROC EA 15 MIN 1/29/2019 Elly Elizondo PT DPT GP 3            Elly Elizondo PT DPT, ATC  1/29/2019

## 2019-02-01 ENCOUNTER — HOSPITAL ENCOUNTER (OUTPATIENT)
Dept: PHYSICAL THERAPY | Facility: HOSPITAL | Age: 59
Setting detail: THERAPIES SERIES
Discharge: HOME | End: 2019-02-01

## 2019-02-01 DIAGNOSIS — S42.021D CLOSED DISPLACED FRACTURE OF SHAFT OF RIGHT CLAVICLE WITH ROUTINE HEALING, SUBSEQUENT ENCOUNTER: Primary | ICD-10-CM

## 2019-02-01 PROCEDURE — 97110 THERAPEUTIC EXERCISES: CPT | Performed by: PHYSICAL THERAPIST

## 2019-02-01 NOTE — THERAPY TREATMENT NOTE
Outpatient Physical Therapy Ortho Treatment Note  [facility]     Patient Name: Belen Larios  : 1960  MRN: [id number]  Today's Date: 2019      Visit Date: 2019      Attendance    Authorized 15 left   Pre Rx pain 1   Post Rx pain 1   % improvement 85-90   MD follow up    Recert date 2/15         Visit Dx:    ICD-10-CM ICD-9-CM   1. Closed displaced fracture of shaft of right clavicle with routine healing, subsequent encounter S42.021D V54.11       Patient Active Problem List   Diagnosis   • Neck pain, acute   • Skin sensation disturbance   • Cervical pain (neck)   • Numbness and tingling   • Degenerative disc disease, cervical   • Pain of right clavicle   • Hypercholesterolemia   • History of pneumothorax   • Closed displaced fracture of shaft of right clavicle   • Multiple closed fractures of ribs of right side        Past Medical History:   Diagnosis Date   • Acute upper respiratory infection    • Allergic rhinitis    • Backache     Backache - possible IT band      • Cough    • Encounter for general adult medical examination without abnormal findings    • Fibrocystic breast    • Health maintenance examination     Adult health examination      • History of screening mammography     Screening mammography      • Hyperlipidemia    • Hyperthyroidism     Hyperthyroidism - hx of same     • Pharyngitis    • Rhinitis    • Sacroiliitis (CMS/HCC)     Sacroiliitis, not elsewhere classified      • Skin lesion     Skin lesion - lesion right cheek      • Upper respiratory infection    • Visit for gynecologic examination         Past Surgical History:   Procedure Laterality Date   • CERVICAL SPINE SURGERY  10/28/2010    Cervical spine disk surgery (1)   • CLAVICLE OPEN REDUCTION INTERNAL FIXATION Right 10/19/2018    Procedure: CLAVICLE OPEN REDUCTION INTERNAL FIXATION with iliac crest bone graft right side.     (C-ARM#3);  Surgeon: Phillip Grant MD;  Location: [facility];  Service:  Orthopedics   • COLONOSCOPY  10/27/2011    Colonoscopy, diagnostic (screening) 40774 (1)      • ENDOSCOPY  03/05/2008    EGD w/ tube 17705 (1)    Hypopharynx, esophagus,NRL. 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 Multiple cold biop was obtained from the antrum. A single cold biop was obtained & placed on PyloriTek strip. Pylorus, duodenum NRL.    • INJECTION OF MEDICATION  12/08/2015    Kenalog (4)      • INJECTION OF MEDICATION  01/17/2015    Rocephin (2)      • INJECTION OF MEDICATION  01/17/2015    Solu-Medrol (1)     • PAP SMEAR  06/22/2015    WNL, CARD SENT, DR. RAMON'S OFFICE   • PROCEDURE GENERIC CONVERTED  02/18/2016    Consult, Dermatology (1)          PT Ortho     Row Name 02/01/19 0800       Subjective Comments    Subjective Comments  added wall push ups and reverse corners. Forgot sleeper stretch in HEP yesterday.  -DD       Subjective Pain    Able to rate subjective pain?  yes  -DD    Pre-Treatment Pain Level  1  -DD    Post-Treatment Pain Level  1  -DD       Posture/Observations    Posture/Observations Comments  Most difficulty with IR  -DD      User Key  (r) = Recorded By, (t) = Taken By, (c) = Cosigned By    Initials Name Provider Type    Elly Eldridge, PT DPT Physical Therapist              PT Assessment/Plan     Row Name 02/01/19 0820          PT Assessment    Assessment Comments  Continuing good progress and is independent in HEP.  -DD     Rehab Potential  Good  -DD     Patient/caregiver participated in establishment of treatment plan and goals  Yes  -DD     Patient would benefit from skilled therapy intervention  Yes  -DD        PT Plan    PT Frequency  2x/week  -DD     Predicted Duration of Therapy Intervention (Therapy Eval)  1 weeks  -DD     PT Plan Comments  Mid trap Wall slides. clocks. rows. Stretches. Fianlize HEP.  -DD       User Key  (r) = Recorded By, (t) = Taken By, (c) = Cosigned By    Initials Name Provider Type    Elly Eldridge, PT DPT Physical Therapist               Exercises     Row Name 02/01/19 0800 02/01/19 0730          Subjective Comments    Subjective Comments  added wall push ups and reverse corners. Forgot sleeper stretch in HEP yesterday.  -DD  --        Subjective Pain    Able to rate subjective pain?  yes  -DD  --     Pre-Treatment Pain Level  1  -DD  --     Post-Treatment Pain Level  1  -DD  --        Exercise 1    Exercise Name 1  --  PRO II-AAROM UE only  -DD     Time 1  --  10'  -DD     Additional Comments  --  L 5 fwd/bkd  -DD        Exercise 2    Exercise Name 2  --  3 Way Shoulder Pulley  -DD     Sets 2  --  1  -DD     Reps 2  --  30  -DD     Additional Comments  --  2#  -DD        Exercise 3    Exercise Name 3  --  supine CP  -DD     Reps 3  --  30  -DD     Additional Comments  --  5#  -DD        Exercise 4    Exercise Name 4  --  st overhead wand flexion/   -DD     Reps 4  --  20  -DD     Additional Comments  --  5#  -DD        Exercise 5    Exercise Name 5  --  angled PREs flexion / diagonal  -DD     Reps 5  --  20  -DD        Exercise 6    Exercise Name 6  --  horz add  -DD     Sets 6  --  2  -DD     Reps 6  --  10  -DD     Additional Comments  --  2#  -DD        Exercise 7    Exercise Name 7  --  Prone ITY  -DD     Sets 7  --  2  -DD     Reps 7  --  10  -DD     Additional Comments  --  1#  -DD        Exercise 8    Exercise Name 8  --  joint mobes/PROM  -DD     Time 8  --  3'  -DD        Exercise 9    Exercise Name 9  --  reverse corners  -DD     Reps 9  --  20  -DD        Exercise 10    Exercise Name 10  --  wall push ups  -DD     Reps 10  --  20   -DD        Exercise 11    Exercise Name 11  --  press downs  -DD     Reps 11  --  20  -DD        Exercise 12    Exercise Name 12  --  SL ER  -DD     Reps 12  --  20  -DD       User Key  (r) = Recorded By, (t) = Taken By, (c) = Cosigned By    Initials Name Provider Type    Elly Eldridge, PT DPT Physical Therapist          PT OP Goals     Row Name 02/01/19 0730          PT Short Term Goals     STG Date to Achieve  12/25/18  -DD     STG 1  Patient will be independent in home exercise program  -DD     STG 1 Progress  Met  -DD     STG 2  Patient will have active shoulder flexion 140°  -DD     STG 2 Progress  Met  -DD     STG 3  Patient will have active shoulder abduction 110°  -DD     STG 3 Progress  Met  -DD     STG 4  Patient will have active external rotation to 70° at 90° abduction  -DD     STG 4 Progress  Met  -DD     STG 5   Patient will have internal rotation to 60° at 90° abduction  -DD     STG 5 Progress  Met  -DD     STG 6  Patient will have quick Dash score of 36% or less  -DD     STG 6 Progress  Met  -DD        Long Term Goals    LTG Date to Achieve  01/04/19  -DD     LTG 1  Patient will be able to report subjectively 75-80% overall improvement  -DD     LTG 1 Progress  Met  -DD     LTG 2  Patient be independent in final home exercise program  -DD     LTG 2 Progress  Progressing  -DD     LTG 3  Patient will have active shoulder flexion 160°  -DD     LTG 3 Progress  Partially Met  -DD     LTG 4  Patient will have active shoulder abduction to 150°  -DD     LTG 4 Progress  Partially Met  -DD     LTG 5  Patient will have quick Dash score of less than 20% deficit  -DD     LTG 5 Progress  Met  -DD     LTG 6  Patient will have met Patient goals of returning to work and put hair up in a ponytail  -DD     LTG 6 Progress  Met  -DD       User Key  (r) = Recorded By, (t) = Taken By, (c) = Cosigned By    Initials Name Provider Type    DD Elly Elizondo PT DPT Physical Therapist          Time Calculation:   Start Time: 0732  Stop Time: 0810  Time Calculation (min): 38 min  Total Timed Code Minutes- PT: 38 minute(s)  Therapy Suggested Charges     Code   Minutes Charges    None           Therapy Charges for Today     Code Description Service Date Service Provider Modifiers Qty    98986631881 HC PT THER PROC EA 15 MIN 2/1/2019 Elly Elizondo PT DPT GP 3          Elly Elizondo PT  DPT  2/1/2019

## 2019-02-05 ENCOUNTER — HOSPITAL ENCOUNTER (OUTPATIENT)
Dept: PHYSICAL THERAPY | Facility: HOSPITAL | Age: 59
Setting detail: THERAPIES SERIES
Discharge: HOME | End: 2019-02-05

## 2019-02-05 DIAGNOSIS — S42.021D CLOSED DISPLACED FRACTURE OF SHAFT OF RIGHT CLAVICLE WITH ROUTINE HEALING, SUBSEQUENT ENCOUNTER: Primary | ICD-10-CM

## 2019-02-05 PROCEDURE — 97110 THERAPEUTIC EXERCISES: CPT | Performed by: PHYSICAL THERAPIST

## 2019-02-05 NOTE — THERAPY TREATMENT NOTE
Outpatient Physical Therapy Ortho Treatment Note  [facility]     Patient Name: Belen Larios  : 1960  MRN: [id number]  Today's Date: 2019      Visit Date: 2019      Attendance    Authorized 19 left on yr   Pre Rx pain 0   Post Rx pain 0   % improvement 90%   MD follow up    Recert date            Visit Dx:    ICD-10-CM ICD-9-CM   1. Closed displaced fracture of shaft of right clavicle with routine healing, subsequent encounter S42.021D V54.11       Patient Active Problem List   Diagnosis   • Neck pain, acute   • Skin sensation disturbance   • Cervical pain (neck)   • Numbness and tingling   • Degenerative disc disease, cervical   • Pain of right clavicle   • Hypercholesterolemia   • History of pneumothorax   • Closed displaced fracture of shaft of right clavicle   • Multiple closed fractures of ribs of right side        Past Medical History:   Diagnosis Date   • Acute upper respiratory infection    • Allergic rhinitis    • Backache     Backache - possible IT band      • Cough    • Encounter for general adult medical examination without abnormal findings    • Fibrocystic breast    • Health maintenance examination     Adult health examination      • History of screening mammography     Screening mammography      • Hyperlipidemia    • Hyperthyroidism     Hyperthyroidism - hx of same     • Pharyngitis    • Rhinitis    • Sacroiliitis (CMS/HCC)     Sacroiliitis, not elsewhere classified      • Skin lesion     Skin lesion - lesion right cheek      • Upper respiratory infection    • Visit for gynecologic examination         Past Surgical History:   Procedure Laterality Date   • CERVICAL SPINE SURGERY  10/28/2010    Cervical spine disk surgery (1)   • CLAVICLE OPEN REDUCTION INTERNAL FIXATION Right 10/19/2018    Procedure: CLAVICLE OPEN REDUCTION INTERNAL FIXATION with iliac crest bone graft right side.     (C-ARM#3);  Surgeon: Phillip Grant MD;  Location: [facility] OR;   Service: Orthopedics   • COLONOSCOPY  10/27/2011    Colonoscopy, diagnostic (screening) 92905 (1)      • ENDOSCOPY  03/05/2008    EGD w/ tube 95579 (1)    Hypopharynx, esophagus,NRL. 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 Multiple cold biop was obtained from the antrum. A single cold biop was obtained & placed on PyloriTek strip. Pylorus, duodenum NRL.    • INJECTION OF MEDICATION  12/08/2015    Kenalog (4)      • INJECTION OF MEDICATION  01/17/2015    Rocephin (2)      • INJECTION OF MEDICATION  01/17/2015    Solu-Medrol (1)     • PAP SMEAR  06/22/2015    WNL, CARD SENT, DR. RAMON'S OFFICE   • PROCEDURE GENERIC CONVERTED  02/18/2016    Consult, Dermatology (1)          PT Ortho     Row Name 02/05/19 0900       Subjective Comments    Subjective Comments  Doing well work and home ()% overall improved  -DD       Subjective Pain    Able to rate subjective pain?  yes  -DD    Pre-Treatment Pain Level  0  -DD    Post-Treatment Pain Level  0  -DD       General ROM    GENERAL ROM COMMENTS  flexion supine 160, abd 148, ER 80 IR 70.  Standing flexion 150  -DD       MMT (Manual Muscle Testing)    General MMT Comments  5/5 flexion, biceps, Tricep and IR 4+. ER 4/5  -DD       Sensation    Sensation WNL?  WNL  -DD    Light Touch  No apparent deficits  -DD      User Key  (r) = Recorded By, (t) = Taken By, (c) = Cosigned By    Initials Name Provider Type    DD Elly Elizondo, PT DPT Physical Therapist                      PT Assessment/Plan     Row Name 02/05/19 0790          PT Assessment    Functional Limitations  Performance in leisure activities;Limitation in home management  -DD     Impairments  Range of motion;Muscle strength  -DD     Assessment Comments  Pt has RTW without limitations.  Independent in self care. has apppropriate HEP.  -DD     Rehab Potential  Good  -DD     Patient/caregiver participated in establishment of treatment plan and goals  Yes  -DD        PT Plan    PT Plan Comments  Discharge PT.  -DD       User  Key  (r) = Recorded By, (t) = Taken By, (c) = Cosigned By    Initials Name Provider Type    DD Elly Elizondo PT DPT Physical Therapist              Exercises     Row Name 02/05/19 0900 02/05/19 0730          Subjective Comments    Subjective Comments  Doing well work and home ()% overall improved  -DD  --        Subjective Pain    Able to rate subjective pain?  yes  -DD  --     Pre-Treatment Pain Level  0  -DD  --     Post-Treatment Pain Level  0  -DD  --        Exercise 1    Exercise Name 1  --  PRO II-AAROM UE only  -DD     Time 1  --  10'  -DD     Additional Comments  --  peak 5 fwd/bkd  -DD        Exercise 2    Exercise Name 2  --  3 Way Shoulder Pulley  -DD     Sets 2  --  1  -DD     Reps 2  --  30  -DD        Exercise 3    Exercise Name 3  --  supine CP  -DD     Reps 3  --  30  -DD        Exercise 4    Exercise Name 4  --  st overhead wand flexion/   -DD     Reps 4  --  20  -DD        Exercise 5    Exercise Name 5  --  angled PREs flexion / diagonal  -DD     Reps 5  --  20  -DD        Exercise 6    Exercise Name 6  --  horz add  -DD     Sets 6  --  2  -DD     Reps 6  --  10  -DD        Exercise 7    Exercise Name 7  --  Prone ITY  -DD     Sets 7  --  2  -DD     Reps 7  --  10  -DD        Exercise 8    Exercise Name 8  --  joint mobes/PROM  -DD     Time 8  --  3'  -DD        Exercise 9    Exercise Name 9  --  reverse corners  -DD     Reps 9  --  20  -DD        Exercise 10    Exercise Name 10  --  wall push ups  -DD     Reps 10  --  20   -DD        Exercise 11    Exercise Name 11  --  press downs  -DD     Reps 11  --  20  -DD        Exercise 12    Exercise Name 12  --  SL ER  -DD     Reps 12  --  20  -DD       User Key  (r) = Recorded By, (t) = Taken By, (c) = Cosigned By    Initials Name Provider Type    Elly Eldridge PT DPT Physical Therapist                         PT OP Goals     Row Name 02/05/19 0730          PT Short Term Goals    STG Date to Achieve  12/25/18  -DD     STG 1   Patient will be independent in home exercise program  -DD     STG 1 Progress  Met  -DD     STG 2  Patient will have active shoulder flexion 140°  -DD     STG 2 Progress  Met  -DD     STG 3  Patient will have active shoulder abduction 110°  -DD     STG 3 Progress  Met  -DD     STG 4  Patient will have active external rotation to 70° at 90° abduction  -DD     STG 4 Progress  Met  -DD     STG 5   Patient will have internal rotation to 60° at 90° abduction  -DD     STG 5 Progress  Met  -DD     STG 6  Patient will have quick Dash score of 36% or less  -DD     STG 6 Progress  Met  -DD        Long Term Goals    LTG Date to Achieve  01/04/19  -DD     LTG 1  Patient will be able to report subjectively 75-80% overall improvement  -DD     LTG 1 Progress  Met  -DD     LTG 2  Patient be independent in final home exercise program  -DD     LTG 2 Progress  Met  -DD     LTG 3  Patient will have active shoulder flexion 160°  -DD     LTG 3 Progress  Partially Met  -DD     LTG 3 Progress Comments  can do supine  -DD     LTG 4  Patient will have active shoulder abduction to 150°  -DD     LTG 4 Progress  Partially Met  -DD     LTG 5  Patient will have quick Dash score of less than 20% deficit  -DD     LTG 5 Progress  Met  -DD     LTG 6  Patient will have met Patient goals of returning to work and put hair up in a ponytail  -DD     LTG 6 Progress  Met  -DD       User Key  (r) = Recorded By, (t) = Taken By, (c) = Cosigned By    Initials Name Provider Type    DD Elly Elizondo, PT DPT Physical Therapist                         Time Calculation:   Start Time: 0732  Stop Time: 0813  Time Calculation (min): 41 min  Total Timed Code Minutes- PT: 41 minute(s)  Therapy Suggested Charges     Code   Minutes Charges    None           Therapy Charges for Today     Code Description Service Date Service Provider Modifiers Qty    15685019085 HC PT THER PROC EA 15 MIN 2/5/2019 Elly Elizondo, PT DPT GP 3                    Elly JONES  Mikhail, PT DPT  2/5/2019

## 2019-02-06 DIAGNOSIS — S42.021D CLOSED DISPLACED FRACTURE OF SHAFT OF RIGHT CLAVICLE WITH ROUTINE HEALING, SUBSEQUENT ENCOUNTER: Primary | ICD-10-CM

## 2019-02-07 ENCOUNTER — OFFICE VISIT (OUTPATIENT)
Dept: ORTHOPEDIC SURGERY | Facility: CLINIC | Age: 59
End: 2019-02-07

## 2019-02-07 VITALS — HEIGHT: 64 IN | BODY MASS INDEX: 24.75 KG/M2 | WEIGHT: 145 LBS

## 2019-02-07 DIAGNOSIS — M89.8X1 PAIN OF RIGHT CLAVICLE: ICD-10-CM

## 2019-02-07 DIAGNOSIS — S42.021D CLOSED DISPLACED FRACTURE OF SHAFT OF RIGHT CLAVICLE WITH ROUTINE HEALING, SUBSEQUENT ENCOUNTER: Primary | ICD-10-CM

## 2019-02-07 PROCEDURE — 99212 OFFICE O/P EST SF 10 MIN: CPT | Performed by: ORTHOPAEDIC SURGERY

## 2019-02-07 NOTE — PROGRESS NOTES
"Belen Larios is a 58 y.o. female returns for     Chief Complaint   Patient presents with   • Right Clavicle - Follow-up       HISTORY OF PRESENT ILLNESS:  Follow up Right clavicle fracture.  ORIF on 10/19/18.  Xray today.  Patient finish PT on Tuesday, but is still doing home exercises.   Mild soreness but MUCH better     CONCURRENT MEDICAL HISTORY:    The following portions of the patient's history were reviewed and updated as appropriate: allergies, current medications, past family history, past medical history, past social history, past surgical history and problem list.     ROS  No fevers or chills.  No chest pain or shortness of air.  No GI or  disturbances.    PHYSICAL EXAMINATION:       Ht 162.6 cm (64\")   Wt 65.8 kg (145 lb)   LMP  (LMP Unknown)   BMI 24.89 kg/m²     Physical Exam   Constitutional: She is oriented to person, place, and time. She appears well-developed and well-nourished.   Neurological: She is alert and oriented to person, place, and time.   Psychiatric: She has a normal mood and affect. Her behavior is normal. Judgment and thought content normal.       GAIT:     [x]  Normal  []  Antalgic    Assistive device: [x]  None  []  Walker     []  Crutches  []  Cane     []  Wheelchair  []  Stretcher    Right Shoulder Exam     Tenderness   The patient is experiencing no tenderness.    Range of Motion   Active abduction: 160   Forward flexion: 170     Muscle Strength   Abduction: 4/5   Supraspinatus: 4/5     Tests   Zheng test: negative    Other   Erythema: absent  Scars: present  Sensation: normal  Pulse: present              Xr Clavicle Right    Result Date: 2/7/2019  Narrative: Ordering Provider:  Phillip Grant MD Ordering Diagnosis/Indication:  Closed displaced fracture of shaft of right clavicle with routine healing, subsequent encounter Procedure:  XR CLAVICLE RIGHT Exam Date:  2/7/19 COMPARISON:  Todays X-rays were compared to previous images dated December 27, 2018. "     Impression:  2 views of the right clavicle show acceptable position and alignment with no evidence of acute bony abnormality.  The fracture site appears to be consolidating and healing.  No sign of implant loosening or failure is noted.  No other acute bony abnormalities are noted.  Also noted on these x-rays are multiple rib fractures that are showing progressive healing in comparison to prior x-ray.  Also noted is the inferior aspect of a prior cervical fusion with the plate and screws appearing to be intact without sign of failure. 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    Patient's Body mass index is 24.89 kg/m². BMI is above normal parameters. Recommendations include: exercise counseling and nutrition counseling.      Return if symptoms worsen or fail to improve, for recheck.    Phillip Grant MD

## 2019-02-14 PROBLEM — J98.8 VIRAL RESPIRATORY ILLNESS: Status: ACTIVE | Noted: 2019-02-14

## 2019-02-14 PROBLEM — B97.89 VIRAL RESPIRATORY ILLNESS: Status: ACTIVE | Noted: 2019-02-14

## 2019-08-07 RX ORDER — LOVASTATIN 20 MG/1
TABLET ORAL
Qty: 90 TABLET | Refills: 3 | Status: SHIPPED | OUTPATIENT
Start: 2019-08-07 | End: 2019-08-22 | Stop reason: SDUPTHER

## 2019-08-15 ENCOUNTER — LAB (OUTPATIENT)
Dept: LAB | Facility: HOSPITAL | Age: 59
End: 2019-08-15

## 2019-08-15 DIAGNOSIS — Z00.00 ANNUAL PHYSICAL EXAM: ICD-10-CM

## 2019-08-15 DIAGNOSIS — R73.9 HYPERGLYCEMIA: ICD-10-CM

## 2019-08-15 LAB
ALBUMIN SERPL-MCNC: 5.1 G/DL (ref 3.5–5.2)
ALBUMIN/GLOB SERPL: 1.7 G/DL
ALP SERPL-CCNC: 59 U/L (ref 39–117)
ALT SERPL W P-5'-P-CCNC: 18 U/L (ref 1–33)
ANION GAP SERPL CALCULATED.3IONS-SCNC: 12.6 MMOL/L (ref 5–15)
AST SERPL-CCNC: 21 U/L (ref 1–32)
BILIRUB SERPL-MCNC: 0.9 MG/DL (ref 0.2–1.2)
BUN BLD-MCNC: 14 MG/DL (ref 6–20)
BUN/CREAT SERPL: 18.2 (ref 7–25)
CALCIUM SPEC-SCNC: 10.3 MG/DL (ref 8.6–10.5)
CHLORIDE SERPL-SCNC: 100 MMOL/L (ref 98–107)
CHOLEST SERPL-MCNC: 202 MG/DL (ref 0–200)
CO2 SERPL-SCNC: 26.4 MMOL/L (ref 22–29)
CREAT BLD-MCNC: 0.77 MG/DL (ref 0.57–1)
GFR SERPL CREATININE-BSD FRML MDRD: 77 ML/MIN/1.73
GLOBULIN UR ELPH-MCNC: 3 GM/DL
GLUCOSE BLD-MCNC: 116 MG/DL (ref 65–99)
HBA1C MFR BLD: 5.2 % (ref 4.8–5.6)
HDLC SERPL-MCNC: 54 MG/DL (ref 40–60)
LDLC SERPL CALC-MCNC: 130 MG/DL (ref 0–100)
LDLC/HDLC SERPL: 2.4 {RATIO}
POTASSIUM BLD-SCNC: 5.2 MMOL/L (ref 3.5–5.2)
PROT SERPL-MCNC: 8.1 G/DL (ref 6–8.5)
SODIUM BLD-SCNC: 139 MMOL/L (ref 136–145)
TRIGL SERPL-MCNC: 91 MG/DL (ref 0–150)
VLDLC SERPL-MCNC: 18.2 MG/DL (ref 5–40)

## 2019-08-15 PROCEDURE — 80053 COMPREHEN METABOLIC PANEL: CPT

## 2019-08-15 PROCEDURE — 36415 COLL VENOUS BLD VENIPUNCTURE: CPT

## 2019-08-15 PROCEDURE — 80061 LIPID PANEL: CPT

## 2019-08-15 PROCEDURE — 83036 HEMOGLOBIN GLYCOSYLATED A1C: CPT

## 2019-08-22 ENCOUNTER — TELEPHONE (OUTPATIENT)
Dept: FAMILY MEDICINE CLINIC | Facility: CLINIC | Age: 59
End: 2019-08-22

## 2019-08-22 ENCOUNTER — OFFICE VISIT (OUTPATIENT)
Dept: FAMILY MEDICINE CLINIC | Facility: CLINIC | Age: 59
End: 2019-08-22

## 2019-08-22 ENCOUNTER — LAB (OUTPATIENT)
Dept: LAB | Facility: HOSPITAL | Age: 59
End: 2019-08-22

## 2019-08-22 VITALS
WEIGHT: 148 LBS | SYSTOLIC BLOOD PRESSURE: 122 MMHG | HEART RATE: 73 BPM | OXYGEN SATURATION: 99 % | DIASTOLIC BLOOD PRESSURE: 88 MMHG | BODY MASS INDEX: 25.27 KG/M2 | HEIGHT: 64 IN

## 2019-08-22 DIAGNOSIS — R53.83 OTHER FATIGUE: ICD-10-CM

## 2019-08-22 DIAGNOSIS — Z00.00 ANNUAL PHYSICAL EXAM: Primary | ICD-10-CM

## 2019-08-22 DIAGNOSIS — Z00.00 ANNUAL PHYSICAL EXAM: ICD-10-CM

## 2019-08-22 PROCEDURE — 81001 URINALYSIS AUTO W/SCOPE: CPT

## 2019-08-22 PROCEDURE — 84443 ASSAY THYROID STIM HORMONE: CPT

## 2019-08-22 PROCEDURE — 84439 ASSAY OF FREE THYROXINE: CPT

## 2019-08-22 PROCEDURE — 36415 COLL VENOUS BLD VENIPUNCTURE: CPT

## 2019-08-22 PROCEDURE — 99396 PREV VISIT EST AGE 40-64: CPT | Performed by: GENERAL PRACTICE

## 2019-08-22 RX ORDER — LOVASTATIN 20 MG/1
20 TABLET ORAL
Qty: 90 TABLET | Refills: 3 | Status: SHIPPED | OUTPATIENT
Start: 2019-08-07 | End: 2020-09-17

## 2019-08-22 NOTE — PROGRESS NOTES
Subjective   Belen Larios is a 58 y.o. female.     Chief Complaint   Patient presents with   • Annual Exam   • Hyperlipidemia       History of Present Illness     For annual wellness exam.  Labs reviewed. Due for mammogram.      The following portions of the patient's history were reviewed and updated as appropriate: allergies, current medications, past family and social history and problem list.    Outpatient Medications Prior to Visit   Medication Sig Dispense Refill   • Calcium Carb-Cholecalciferol (CALCIUM CARBONATE-VITAMIN D3) 600-400 MG-UNIT tablet Take 600 mg by mouth daily.     • lovastatin (MEVACOR) 20 MG tablet TAKE 1 TABLET BY MOUTH EVERY DAY AT NIGHT 90 tablet 3   • promethazine-dextromethorphan (PROMETHAZINE-DM) 6.25-15 MG/5ML syrup Take 5 mL by mouth At Night As Needed for Cough. 150 mL 0   • traZODone (DESYREL) 50 MG tablet Take 50 mg by mouth Every Night. 1-2 tablets nightly prn       No facility-administered medications prior to visit.      Review of Systems   Constitutional: Negative.  Negative for chills, fatigue, fever and unexpected weight change.   HENT: Negative.  Negative for congestion, ear pain, hearing loss, nosebleeds, rhinorrhea, sneezing, sore throat and tinnitus.    Eyes: Negative.  Negative for discharge.   Respiratory: Negative.  Negative for cough, shortness of breath and wheezing.    Cardiovascular: Negative.  Negative for chest pain and palpitations.   Gastrointestinal: Negative.  Negative for abdominal pain, constipation, diarrhea, nausea and vomiting.   Endocrine: Negative.    Genitourinary: Negative.  Negative for dysuria, frequency and urgency.   Musculoskeletal: Negative.  Negative for arthralgias, back pain, joint swelling, myalgias and neck pain.   Skin: Negative.  Negative for rash.   Allergic/Immunologic: Negative.    Neurological: Negative.  Negative for dizziness, weakness, numbness and headaches.   Hematological: Negative.  Negative for adenopathy.  "  Psychiatric/Behavioral: Negative.  Negative for dysphoric mood and sleep disturbance. The patient is not nervous/anxious.      Objective     Visit Vitals  /88 (BP Location: Left arm)   Pulse 73   Ht 162.6 cm (64\")   Wt 67.1 kg (148 lb)   LMP  (LMP Unknown)   SpO2 99%   BMI 25.40 kg/m²     Physical Exam   Constitutional: She is oriented to person, place, and time. She appears well-developed and well-nourished. No distress.   HENT:   Head: Normocephalic and atraumatic.   Nose: Nose normal.   Mouth/Throat: Oropharynx is clear and moist.   Eyes: Conjunctivae and EOM are normal. Pupils are equal, round, and reactive to light. Right eye exhibits no discharge. Left eye exhibits no discharge.   Neck: No tracheal deviation present. No thyromegaly present.   Cardiovascular: Normal rate, regular rhythm, normal heart sounds and intact distal pulses.   No murmur heard.  Pulmonary/Chest: Effort normal and breath sounds normal. No respiratory distress. She has no wheezes. She has no rales. She exhibits no tenderness. Right breast exhibits no inverted nipple, no mass, no nipple discharge, no skin change and no tenderness. Left breast exhibits no inverted nipple, no mass, no nipple discharge, no skin change and no tenderness.   Abdominal: Soft. Bowel sounds are normal. She exhibits no distension and no mass. There is no tenderness. No hernia.   Musculoskeletal: Normal range of motion. She exhibits no deformity.   Lymphadenopathy:     She has no cervical adenopathy.   Neurological: She is alert and oriented to person, place, and time. She has normal reflexes.   Skin: Skin is warm and dry.   Psychiatric: She has a normal mood and affect. Her behavior is normal. Judgment and thought content normal.   Nursing note and vitals reviewed.    Results for orders placed or performed in visit on 08/15/19   Comprehensive Metabolic Panel   Result Value Ref Range    Glucose 116 (H) 65 - 99 mg/dL    BUN 14 6 - 20 mg/dL    Creatinine 0.77 " 0.57 - 1.00 mg/dL    Sodium 139 136 - 145 mmol/L    Potassium 5.2 3.5 - 5.2 mmol/L    Chloride 100 98 - 107 mmol/L    CO2 26.4 22.0 - 29.0 mmol/L    Calcium 10.3 8.6 - 10.5 mg/dL    Total Protein 8.1 6.0 - 8.5 g/dL    Albumin 5.10 3.50 - 5.20 g/dL    ALT (SGPT) 18 1 - 33 U/L    AST (SGOT) 21 1 - 32 U/L    Alkaline Phosphatase 59 39 - 117 U/L    Total Bilirubin 0.9 0.2 - 1.2 mg/dL    eGFR Non African Amer 77 >60 mL/min/1.73    Globulin 3.0 gm/dL    A/G Ratio 1.7 g/dL    BUN/Creatinine Ratio 18.2 7.0 - 25.0    Anion Gap 12.6 5.0 - 15.0 mmol/L   Hemoglobin A1c   Result Value Ref Range    Hemoglobin A1C 5.20 4.80 - 5.60 %   Lipid Panel   Result Value Ref Range    Total Cholesterol 202 (H) 0 - 200 mg/dL    Triglycerides 91 0 - 150 mg/dL    HDL Cholesterol 54 40 - 60 mg/dL    LDL Cholesterol  130 (H) 0 - 100 mg/dL    VLDL Cholesterol 18.2 5 - 40 mg/dL    LDL/HDL Ratio 2.40       Assessment/Plan   Problem List Items Addressed This Visit     None      Visit Diagnoses     Annual physical exam    -  Primary    Relevant Orders    TSH    T4, Free    Urinalysis With Microscopic - Urine, Clean Catch    Other fatigue        Relevant Orders    T4, Free    Urinalysis With Microscopic - Urine, Clean Catch         Will notify regarding results. Continue current treatment.     New Medications Ordered This Visit   Medications   • lovastatin (MEVACOR) 20 MG tablet     Sig: Take 1 tablet by mouth every night at bedtime.     Dispense:  90 tablet     Refill:  3     NEED REFILL. IF POSSIBLE 90DAYS     Return in about 1 year (around 8/22/2020) for Annual physical.

## 2019-08-22 NOTE — TELEPHONE ENCOUNTER
Belen said Dr Lucio wanted to know when her last tetanus was, it was 09-01-18.  She said she was in an accident in [place] and was treated at [facility] and they gave her one that day.  Wanted it added to her chart

## 2019-08-23 LAB
BACTERIA UR QL AUTO: ABNORMAL /HPF
BILIRUB UR QL STRIP: NEGATIVE
CLARITY UR: CLEAR
COLOR UR: YELLOW
GLUCOSE UR STRIP-MCNC: NEGATIVE MG/DL
HGB UR QL STRIP.AUTO: NEGATIVE
HYALINE CASTS UR QL AUTO: ABNORMAL /LPF
KETONES UR QL STRIP: NEGATIVE
LEUKOCYTE ESTERASE UR QL STRIP.AUTO: NEGATIVE
NITRITE UR QL STRIP: NEGATIVE
PH UR STRIP.AUTO: 6.5 [PH] (ref 5–8)
PROT UR QL STRIP: NEGATIVE
RBC # UR: ABNORMAL /HPF
REF LAB TEST METHOD: ABNORMAL
SP GR UR STRIP: 1.02 (ref 1–1.03)
SQUAMOUS #/AREA URNS HPF: ABNORMAL /HPF
T4 FREE SERPL-MCNC: 1.27 NG/DL (ref 0.93–1.7)
TSH SERPL DL<=0.05 MIU/L-ACNC: 1.75 MIU/ML (ref 0.27–4.2)
UROBILINOGEN UR QL STRIP: NORMAL
WBC UR QL AUTO: ABNORMAL /HPF

## 2019-09-05 DIAGNOSIS — Z13.820 ENCOUNTER FOR SCREENING FOR OSTEOPOROSIS: Primary | ICD-10-CM

## 2019-09-05 DIAGNOSIS — Z12.31 ENCOUNTER FOR SCREENING MAMMOGRAM FOR MALIGNANT NEOPLASM OF BREAST: ICD-10-CM

## 2019-10-28 DIAGNOSIS — Z13.820 SCREENING FOR OSTEOPOROSIS: Primary | ICD-10-CM

## 2019-10-28 DIAGNOSIS — Z12.31 ENCOUNTER FOR SCREENING MAMMOGRAM FOR MALIGNANT NEOPLASM OF BREAST: Primary | ICD-10-CM

## 2019-11-07 ENCOUNTER — OFFICE VISIT (OUTPATIENT)
Dept: FAMILY MEDICINE CLINIC | Facility: CLINIC | Age: 59
End: 2019-11-07

## 2019-11-07 VITALS
SYSTOLIC BLOOD PRESSURE: 120 MMHG | WEIGHT: 148.8 LBS | HEIGHT: 64 IN | HEART RATE: 66 BPM | DIASTOLIC BLOOD PRESSURE: 90 MMHG | OXYGEN SATURATION: 98 % | BODY MASS INDEX: 25.4 KG/M2

## 2019-11-07 DIAGNOSIS — R19.7 DIARRHEA OF PRESUMED INFECTIOUS ORIGIN: Primary | ICD-10-CM

## 2019-11-07 PROCEDURE — 99212 OFFICE O/P EST SF 10 MIN: CPT | Performed by: FAMILY MEDICINE

## 2019-11-07 NOTE — PROGRESS NOTES
"Acute Office Visit        Belen Larios is a 59 y.o. female that presents today for   Chief Complaint   Patient presents with   • Diarrhea     [id number], stomach cramping and churning       Diarrhea    This is a new problem. Episode onset: about 10 days ago. Episode frequency: 3 times per day. The problem has been gradually improving (slightly better today). Diarrhea characteristics: more of loose stool, not watery, no blood. Associated symptoms include abdominal pain (just right before BM). Pertinent negatives include no bloating, chills, fever, increased  flatus, vomiting or weight loss. Nothing aggravates the symptoms. 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 She has tried anti-motility drug for the symptoms. Improvement on treatment: no change in symptoms.       The following portions of the patient's history were reviewed and updated as appropriate: allergies, current medications, past medical history and problem list.    Review of Systems   Constitutional: Negative for appetite change, chills, fever and weight loss.   Respiratory: Negative for shortness of breath.    Gastrointestinal: Positive for abdominal pain (just right before BM) and diarrhea. Negative for abdominal distention, bloating, blood in stool, flatus, nausea and vomiting.           Vitals:    11/07/19 1407   BP: 120/90   Pulse: 66   SpO2: 98%   Weight: 67.5 kg (148 lb 12.8 oz)   Height: 162.6 cm (64\")     Physical Exam   Constitutional: She is oriented to person, place, and time. She appears well-developed and well-nourished. No distress.   HENT:   Head: Normocephalic and atraumatic.   Eyes: Conjunctivae and EOM are normal.   Neck: Neck supple.   Cardiovascular: Normal rate and regular rhythm.   No murmur heard.  Pulmonary/Chest: Effort normal and breath sounds normal. No respiratory distress.   Abdominal: Soft. Normal appearance and bowel sounds are normal. She exhibits no " distension, no ascites, no pulsatile midline mass and no mass. There is no hepatomegaly. There is no tenderness. There is no rigidity, no rebound and no guarding.   Neurological: She is alert and oriented to person, place, and time.   Skin: Skin is warm and dry. No rash noted.   Psychiatric: She has a normal mood and affect.           Belen was seen today for diarrhea.    Diagnoses and all orders for this visit:    Diarrhea of presumed infectious origin  Patient having loose stools about 3 times a day for the last 10 days.  She reports some improvement that started yesterday.  She is otherwise feeling well, and does not have any associated symptoms.  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  Patient at higher risk than the average person for C. difficile infection, however does not report watery consistency/foul smell that is usually associated with this disease process.  Diarrhea is likely viral in etiology, and should continue to show improvement over the next few days.  Advised patient to rest and drink plenty of fluids, including something with electrolytes to replace those that she has lost.  Patient should contact our office or be reevaluated with any worsening symptoms, new associated symptoms or persistence of symptoms for another 7 days.  If patient calls in with concerns of persistent diarrhea, would consider C. difficile testing and additional stool studies.  Patient was agreeable with this plan.          This document has been electronically signed by Elsy Fleming MD on November 7, 2019 2:21 PM

## 2020-09-10 ENCOUNTER — TRANSCRIBE ORDERS (OUTPATIENT)
Dept: LAB | Facility: HOSPITAL | Age: 60
End: 2020-09-10

## 2020-09-10 ENCOUNTER — LAB (OUTPATIENT)
Dept: LAB | Facility: HOSPITAL | Age: 60
End: 2020-09-10

## 2020-09-10 DIAGNOSIS — Z00.00 ANNUAL PHYSICAL EXAM: Primary | ICD-10-CM

## 2020-09-10 DIAGNOSIS — Z00.00 ROUTINE GENERAL MEDICAL EXAMINATION AT A HEALTH CARE FACILITY: Primary | ICD-10-CM

## 2020-09-10 DIAGNOSIS — Z00.00 ROUTINE GENERAL MEDICAL EXAMINATION AT A HEALTH CARE FACILITY: ICD-10-CM

## 2020-09-10 LAB
ALBUMIN SERPL-MCNC: 5.3 G/DL (ref 3.5–5.2)
ALBUMIN/GLOB SERPL: 2.1 G/DL
ALP SERPL-CCNC: 61 U/L (ref 39–117)
ALT SERPL W P-5'-P-CCNC: 18 U/L (ref 1–33)
ANION GAP SERPL CALCULATED.3IONS-SCNC: 13.6 MMOL/L (ref 5–15)
AST SERPL-CCNC: 19 U/L (ref 1–32)
BILIRUB SERPL-MCNC: 0.6 MG/DL (ref 0–1.2)
BILIRUB UR QL STRIP: NEGATIVE
BUN SERPL-MCNC: 11 MG/DL (ref 6–20)
BUN/CREAT SERPL: 15.3 (ref 7–25)
CALCIUM SPEC-SCNC: 9.9 MG/DL (ref 8.6–10.5)
CHLORIDE SERPL-SCNC: 101 MMOL/L (ref 98–107)
CHOLEST SERPL-MCNC: 219 MG/DL (ref 0–200)
CLARITY UR: CLEAR
CO2 SERPL-SCNC: 23.4 MMOL/L (ref 22–29)
COLOR UR: YELLOW
CREAT SERPL-MCNC: 0.72 MG/DL (ref 0.57–1)
GFR SERPL CREATININE-BSD FRML MDRD: 83 ML/MIN/1.73
GLOBULIN UR ELPH-MCNC: 2.5 GM/DL
GLUCOSE SERPL-MCNC: 104 MG/DL (ref 65–99)
GLUCOSE UR STRIP-MCNC: NEGATIVE MG/DL
HDLC SERPL-MCNC: 55 MG/DL (ref 40–60)
HGB UR QL STRIP.AUTO: ABNORMAL
KETONES UR QL STRIP: NEGATIVE
LDLC SERPL CALC-MCNC: 148 MG/DL (ref 0–100)
LDLC/HDLC SERPL: 2.68 {RATIO}
LEUKOCYTE ESTERASE UR QL STRIP.AUTO: ABNORMAL
NITRITE UR QL STRIP: NEGATIVE
PH UR STRIP.AUTO: 6.5 [PH] (ref 5–8)
POTASSIUM SERPL-SCNC: 4.1 MMOL/L (ref 3.5–5.2)
PROT SERPL-MCNC: 7.8 G/DL (ref 6–8.5)
PROT UR QL STRIP: NEGATIVE
SODIUM SERPL-SCNC: 138 MMOL/L (ref 136–145)
SP GR UR STRIP: 1.01 (ref 1–1.03)
TRIGL SERPL-MCNC: 82 MG/DL (ref 0–150)
UROBILINOGEN UR QL STRIP: ABNORMAL
VLDLC SERPL-MCNC: 16.4 MG/DL (ref 5–40)

## 2020-09-10 PROCEDURE — 36415 COLL VENOUS BLD VENIPUNCTURE: CPT

## 2020-09-10 PROCEDURE — 81003 URINALYSIS AUTO W/O SCOPE: CPT

## 2020-09-10 PROCEDURE — 80053 COMPREHEN METABOLIC PANEL: CPT

## 2020-09-10 PROCEDURE — 80061 LIPID PANEL: CPT

## 2020-09-17 ENCOUNTER — OFFICE VISIT (OUTPATIENT)
Dept: FAMILY MEDICINE CLINIC | Facility: CLINIC | Age: 60
End: 2020-09-17

## 2020-09-17 VITALS
OXYGEN SATURATION: 98 % | HEIGHT: 64 IN | WEIGHT: 147.5 LBS | HEART RATE: 62 BPM | SYSTOLIC BLOOD PRESSURE: 130 MMHG | DIASTOLIC BLOOD PRESSURE: 80 MMHG | BODY MASS INDEX: 25.18 KG/M2

## 2020-09-17 DIAGNOSIS — Z00.00 ANNUAL PHYSICAL EXAM: Primary | ICD-10-CM

## 2020-09-17 DIAGNOSIS — Z13.820 ENCOUNTER FOR SCREENING FOR OSTEOPOROSIS: ICD-10-CM

## 2020-09-17 DIAGNOSIS — Z78.0 POSTMENOPAUSAL: ICD-10-CM

## 2020-09-17 PROCEDURE — 99396 PREV VISIT EST AGE 40-64: CPT | Performed by: GENERAL PRACTICE

## 2020-09-17 NOTE — PROGRESS NOTES
Subjective   Belen Larios is a 60 y.o. female.     Chief Complaint   Patient presents with   • Annual Exam     labs desmond       History of Present Illness     For annual wellness exam.  Labs reviewed. Due for mammogram.      The following portions of the patient's history were reviewed and updated as appropriate: allergies, current medications, past family and social history and problem list.    Outpatient Medications Prior to Visit   Medication Sig Dispense Refill   • Calcium Carb-Cholecalciferol (CALCIUM CARBONATE-VITAMIN D3) 600-400 MG-UNIT tablet Take 600 mg by mouth daily.     • lovastatin (MEVACOR) 20 MG tablet Take 1 tablet by mouth every night. 90 tablet 3   • lovastatin (MEVACOR) 20 MG tablet Take 1 tablet by mouth every night at bedtime. 90 tablet 3     No facility-administered medications prior to visit.      Review of Systems   Constitutional: Negative.  Negative for chills, fatigue, fever and unexpected weight change.   HENT: Negative.  Negative for congestion, ear pain, hearing loss, nosebleeds, rhinorrhea, sneezing, sore throat and tinnitus.    Eyes: Negative.  Negative for discharge.   Respiratory: Negative.  Negative for cough, shortness of breath and wheezing.    Cardiovascular: Negative.  Negative for chest pain and palpitations.   Gastrointestinal: Negative.  Negative for abdominal pain, constipation, diarrhea, nausea and vomiting.   Endocrine: Negative.    Genitourinary: Negative.  Negative for dysuria, frequency and urgency.   Musculoskeletal: Negative.  Negative for arthralgias, back pain, joint swelling, myalgias and neck pain.   Skin: Negative.  Negative for rash.   Allergic/Immunologic: Negative.    Neurological: Negative.  Negative for dizziness, weakness, numbness and headaches.   Hematological: Negative.  Negative for adenopathy.   Psychiatric/Behavioral: Negative.  Negative for dysphoric mood and sleep disturbance. The patient is not nervous/anxious.      Objective     Visit  "Vitals  /80   Pulse 62   Ht 162.6 cm (64\")   Wt 66.9 kg (147 lb 8 oz)   LMP  (LMP Unknown)   SpO2 98%   BMI 25.32 kg/m²     Physical Exam  Vitals signs and nursing note reviewed.   Constitutional:       General: She is not in acute distress.     Appearance: She is well-developed.   HENT:      Head: Normocephalic and atraumatic.      Nose: Nose normal.   Eyes:      General:         Right eye: No discharge.         Left eye: No discharge.      Conjunctiva/sclera: Conjunctivae normal.      Pupils: Pupils are equal, round, and reactive to light.   Neck:      Thyroid: No thyromegaly.      Trachea: No tracheal deviation.   Cardiovascular:      Rate and Rhythm: Normal rate and regular rhythm.      Heart sounds: Normal heart sounds. No murmur.   Pulmonary:      Effort: Pulmonary effort is normal. No respiratory distress.      Breath sounds: Normal breath sounds. No wheezing or rales.   Chest:      Chest wall: No tenderness.      Breasts:         Right: No inverted nipple, mass, nipple discharge, skin change or tenderness.         Left: No inverted nipple, mass, nipple discharge, skin change or tenderness.   Abdominal:      General: Bowel sounds are normal. There is no distension.      Palpations: Abdomen is soft. There is no mass.      Tenderness: There is no abdominal tenderness.      Hernia: No hernia is present.   Musculoskeletal: Normal range of motion.         General: No deformity.   Lymphadenopathy:      Cervical: No cervical adenopathy.   Skin:     General: Skin is warm and dry.   Neurological:      Mental Status: She is alert and oriented to person, place, and time.      Deep Tendon Reflexes: Reflexes are normal and symmetric.   Psychiatric:         Behavior: Behavior normal.         Thought Content: Thought content normal.         Judgment: Judgment normal.       Results for orders placed or performed in visit on 09/10/20   Lipid Panel    Specimen: Blood   Result Value Ref Range    Total Cholesterol 219 (H) " 0 - 200 mg/dL    Triglycerides 82 0 - 150 mg/dL    HDL Cholesterol 55 40 - 60 mg/dL    LDL Cholesterol  148 (H) 0 - 100 mg/dL    VLDL Cholesterol 16.4 5 - 40 mg/dL    LDL/HDL Ratio 2.68    Comprehensive Metabolic Panel    Specimen: Blood   Result Value Ref Range    Glucose 104 (H) 65 - 99 mg/dL    BUN 11 6 - 20 mg/dL    Creatinine 0.72 0.57 - 1.00 mg/dL    Sodium 138 136 - 145 mmol/L    Potassium 4.1 3.5 - 5.2 mmol/L    Chloride 101 98 - 107 mmol/L    CO2 23.4 22.0 - 29.0 mmol/L    Calcium 9.9 8.6 - 10.5 mg/dL    Total Protein 7.8 6.0 - 8.5 g/dL    Albumin 5.30 (H) 3.50 - 5.20 g/dL    ALT (SGPT) 18 1 - 33 U/L    AST (SGOT) 19 1 - 32 U/L    Alkaline Phosphatase 61 39 - 117 U/L    Total Bilirubin 0.6 0.0 - 1.2 mg/dL    eGFR Non African Amer 83 >60 mL/min/1.73    Globulin 2.5 gm/dL    A/G Ratio 2.1 g/dL    BUN/Creatinine Ratio 15.3 7.0 - 25.0    Anion Gap 13.6 5.0 - 15.0 mmol/L   Urinalysis without microscopic (no culture) - Urine, Clean Catch    Specimen: Urine, Clean Catch   Result Value Ref Range    Color, UA Yellow Yellow, Straw    Appearance, UA Clear Clear    pH, UA 6.5 5.0 - 8.0    Specific Gravity, UA 1.007 1.005 - 1.030    Glucose, UA Negative Negative    Ketones, UA Negative Negative    Bilirubin, UA Negative Negative    Blood, UA Trace (A) Negative    Protein, UA Negative Negative    Leuk Esterase, UA Moderate (2+) (A) Negative    Nitrite, UA Negative Negative    Urobilinogen, UA 0.2 E.U./dL 0.2 - 1.0 E.U./dL      Notes brought forward are reviewed and updated if indicated.     Assessment/Plan   Problem List Items Addressed This Visit     None      Visit Diagnoses     Annual physical exam    -  Primary    Relevant Orders    Comprehensive Metabolic Panel    Lipid Panel    Urinalysis With Microscopic - Urine, Clean Catch    Encounter for screening for osteoporosis        Relevant Orders    DEXA Bone Density Axial    Postmenopausal        Relevant Orders    DEXA Bone Density Axial         Will notify regarding  results. Continue current treatment.     No orders of the defined types were placed in this encounter.    Return in about 1 year (around 9/17/2021) for Annual physical.

## 2020-09-18 RX ORDER — LOVASTATIN 40 MG/1
40 TABLET ORAL NIGHTLY
Qty: 90 TABLET | Refills: 3 | Status: SHIPPED | OUTPATIENT
Start: 2020-09-18 | End: 2021-09-08 | Stop reason: SDUPTHER

## 2020-09-18 NOTE — TELEPHONE ENCOUNTER
Per Dr. Lucio, Ms. Larios has been called with recent DEXA Bone Density Scan results & recommendations. (message left on her VM)  Continue current medications and follow-up as planned or sooner if any problems.  Letter sent as well with results and recommendations included        ----- Message from Cathleen Lucio MD sent at 9/17/2020  8:40 PM CDT -----  Call and tell bone density shows some bone loss but not osteoporosis, make sure she is taking Ca + D 600mg daily and exercising regularly

## 2020-10-13 DIAGNOSIS — Z12.31 ENCOUNTER FOR SCREENING MAMMOGRAM FOR MALIGNANT NEOPLASM OF BREAST: Primary | ICD-10-CM

## 2020-12-21 ENCOUNTER — IMMUNIZATION (OUTPATIENT)
Dept: VACCINE CLINIC | Facility: HOSPITAL | Age: 60
End: 2020-12-21

## 2020-12-21 PROCEDURE — 91300 HC SARSCOV02 VAC 30MCG/0.3ML IM: CPT | Performed by: THORACIC SURGERY (CARDIOTHORACIC VASCULAR SURGERY)

## 2020-12-21 PROCEDURE — 0001A: CPT | Performed by: THORACIC SURGERY (CARDIOTHORACIC VASCULAR SURGERY)

## 2021-01-04 ENCOUNTER — OFFICE VISIT (OUTPATIENT)
Dept: FAMILY MEDICINE CLINIC | Facility: CLINIC | Age: 61
End: 2021-01-04

## 2021-01-04 VITALS
WEIGHT: 147 LBS | DIASTOLIC BLOOD PRESSURE: 74 MMHG | HEART RATE: 77 BPM | SYSTOLIC BLOOD PRESSURE: 130 MMHG | BODY MASS INDEX: 25.1 KG/M2 | OXYGEN SATURATION: 98 % | HEIGHT: 64 IN

## 2021-01-04 DIAGNOSIS — G47.09 OTHER INSOMNIA: Primary | ICD-10-CM

## 2021-01-04 PROCEDURE — 99213 OFFICE O/P EST LOW 20 MIN: CPT | Performed by: GENERAL PRACTICE

## 2021-01-04 RX ORDER — TRAZODONE HYDROCHLORIDE 50 MG/1
50 TABLET ORAL NIGHTLY
Qty: 30 TABLET | Refills: 5 | Status: SHIPPED | OUTPATIENT
Start: 2021-01-04 | End: 2021-05-26 | Stop reason: SDUPTHER

## 2021-01-04 NOTE — PROGRESS NOTES
Subjective   Belen Larios is a 60 y.o. female.   Chief Complaint   Patient presents with   • Insomnia     Insomnia  This is a chronic problem. The current episode started 1 to 4 weeks ago. The problem occurs constantly. The problem has been unchanged. Associated symptoms include fatigue. Pertinent negatives include no abdominal pain, arthralgias, chest pain, chills, congestion, coughing, fever, headaches, joint swelling, myalgias, nausea, neck pain, numbness, rash, sore throat, vomiting or weakness. Associated symptoms comments: irritability. The symptoms are aggravated by stress. Treatments tried: Melatonin, tylenol pm, benadryl. The treatment provided mild relief.      The following portions of the patient's history were reviewed and updated as appropriate: allergies, current medications, past social history and problem list.    Outpatient Medications Prior to Visit   Medication Sig Dispense Refill   • Calcium Carb-Cholecalciferol (CALCIUM CARBONATE-VITAMIN D3) 600-400 MG-UNIT tablet Take 600 mg by mouth daily.     • lovastatin (MEVACOR) 40 MG tablet Take 1 tablet by mouth Every Night. 90 tablet 3     No facility-administered medications prior to visit.        Review of Systems   Constitutional: Positive for fatigue. Negative for chills, fever and unexpected weight change.   HENT: Negative.  Negative for congestion, ear pain, hearing loss, nosebleeds, rhinorrhea, sneezing, sore throat and tinnitus.    Eyes: Negative.  Negative for discharge.   Respiratory: Negative.  Negative for cough, shortness of breath and wheezing.    Cardiovascular: Negative.  Negative for chest pain and palpitations.   Gastrointestinal: Negative.  Negative for abdominal pain, constipation, diarrhea, nausea and vomiting.   Endocrine: Negative.    Genitourinary: Negative.  Negative for dysuria, frequency and urgency.   Musculoskeletal: Negative.  Negative for arthralgias, back pain, joint swelling, myalgias and neck pain.   Skin:  "Negative.  Negative for rash.   Allergic/Immunologic: Negative.    Neurological: Negative.  Negative for dizziness, weakness, numbness and headaches.   Hematological: Negative.  Negative for adenopathy.   Psychiatric/Behavioral: Negative for dysphoric mood and sleep disturbance. The patient has insomnia. The patient is not nervous/anxious.        Objective   Visit Vitals  /74   Pulse 77   Ht 162.6 cm (64\")   Wt 66.7 kg (147 lb)   LMP  (LMP Unknown)   SpO2 98%   BMI 25.23 kg/m²     Physical Exam  Vitals signs and nursing note reviewed.   Constitutional:       General: She is not in acute distress.     Appearance: She is well-developed.   HENT:      Head: Normocephalic and atraumatic.      Nose: Nose normal.   Eyes:      General:         Right eye: No discharge.         Left eye: No discharge.      Conjunctiva/sclera: Conjunctivae normal.      Pupils: Pupils are equal, round, and reactive to light.   Neck:      Thyroid: No thyromegaly.   Cardiovascular:      Rate and Rhythm: Normal rate and regular rhythm.      Heart sounds: Normal heart sounds.   Pulmonary:      Effort: Pulmonary effort is normal.      Breath sounds: Normal breath sounds.   Lymphadenopathy:      Cervical: No cervical adenopathy.   Skin:     General: Skin is warm and dry.   Neurological:      Mental Status: She is alert and oriented to person, place, and time.         Notes brought forward are reviewed and updated if indicated.     Assessment/Plan   Problems Addressed this Visit     None      Visit Diagnoses     Other insomnia    -  Primary    Relevant Medications    traZODone (DESYREL) 50 MG tablet      Diagnoses       Codes Comments    Other insomnia    -  Primary ICD-10-CM: G47.09  ICD-9-CM: 780.52          Start trazodone which has worked for her in the past.  She is planning on retiring in June, hopefully her stress level will decrease and she will be able to come off this. Recheck if not improving.      New Medications Ordered This Visit "   Medications   • traZODone (DESYREL) 50 MG tablet     Sig: Take 1 tablet by mouth Every Night.     Dispense:  30 tablet     Refill:  5     No follow-ups on file.

## 2021-01-11 ENCOUNTER — IMMUNIZATION (OUTPATIENT)
Dept: VACCINE CLINIC | Facility: HOSPITAL | Age: 61
End: 2021-01-11

## 2021-01-11 PROCEDURE — 91300 HC SARSCOV02 VAC 30MCG/0.3ML IM: CPT | Performed by: THORACIC SURGERY (CARDIOTHORACIC VASCULAR SURGERY)

## 2021-01-11 PROCEDURE — 0001A: CPT | Performed by: THORACIC SURGERY (CARDIOTHORACIC VASCULAR SURGERY)

## 2021-01-11 PROCEDURE — 0002A: CPT | Performed by: THORACIC SURGERY (CARDIOTHORACIC VASCULAR SURGERY)

## 2021-05-26 DIAGNOSIS — G47.09 OTHER INSOMNIA: ICD-10-CM

## 2021-05-26 RX ORDER — TRAZODONE HYDROCHLORIDE 50 MG/1
50 TABLET ORAL NIGHTLY
Qty: 30 TABLET | Refills: 3 | Status: SHIPPED | OUTPATIENT
Start: 2021-05-26 | End: 2021-09-08 | Stop reason: SDUPTHER

## 2021-09-08 DIAGNOSIS — G47.09 OTHER INSOMNIA: ICD-10-CM

## 2021-09-08 RX ORDER — TRAZODONE HYDROCHLORIDE 50 MG/1
50 TABLET ORAL NIGHTLY
Qty: 30 TABLET | Refills: 3 | Status: SHIPPED | OUTPATIENT
Start: 2021-09-08 | End: 2021-09-14

## 2021-09-08 RX ORDER — LOVASTATIN 40 MG/1
40 TABLET ORAL NIGHTLY
Qty: 90 TABLET | Refills: 3 | Status: SHIPPED | OUTPATIENT
Start: 2021-09-08 | End: 2022-09-13 | Stop reason: SDUPTHER

## 2021-09-14 ENCOUNTER — LAB (OUTPATIENT)
Dept: LAB | Facility: HOSPITAL | Age: 61
End: 2021-09-14

## 2021-09-14 DIAGNOSIS — Z00.00 ANNUAL PHYSICAL EXAM: ICD-10-CM

## 2021-09-14 LAB
ALBUMIN SERPL-MCNC: 4.9 G/DL (ref 3.5–5.2)
ALBUMIN/GLOB SERPL: 2.1 G/DL
ALP SERPL-CCNC: 47 U/L (ref 39–117)
ALT SERPL W P-5'-P-CCNC: 14 U/L (ref 1–33)
ANION GAP SERPL CALCULATED.3IONS-SCNC: 11.3 MMOL/L (ref 5–15)
AST SERPL-CCNC: 14 U/L (ref 1–32)
BACTERIA UR QL AUTO: ABNORMAL /HPF
BILIRUB SERPL-MCNC: 0.6 MG/DL (ref 0–1.2)
BILIRUB UR QL STRIP: NEGATIVE
BUN SERPL-MCNC: 16 MG/DL (ref 8–23)
BUN/CREAT SERPL: 20.3 (ref 7–25)
CALCIUM SPEC-SCNC: 9.6 MG/DL (ref 8.6–10.5)
CHLORIDE SERPL-SCNC: 103 MMOL/L (ref 98–107)
CHOLEST SERPL-MCNC: 179 MG/DL (ref 0–200)
CLARITY UR: CLEAR
CO2 SERPL-SCNC: 23.7 MMOL/L (ref 22–29)
COLOR UR: YELLOW
CREAT SERPL-MCNC: 0.79 MG/DL (ref 0.57–1)
GFR SERPL CREATININE-BSD FRML MDRD: 74 ML/MIN/1.73
GLOBULIN UR ELPH-MCNC: 2.3 GM/DL
GLUCOSE SERPL-MCNC: 101 MG/DL (ref 65–99)
GLUCOSE UR STRIP-MCNC: NEGATIVE MG/DL
HDLC SERPL-MCNC: 47 MG/DL (ref 40–60)
HGB UR QL STRIP.AUTO: NEGATIVE
HYALINE CASTS UR QL AUTO: ABNORMAL /LPF
KETONES UR QL STRIP: ABNORMAL
LDLC SERPL CALC-MCNC: 115 MG/DL (ref 0–100)
LDLC/HDLC SERPL: 2.43 {RATIO}
LEUKOCYTE ESTERASE UR QL STRIP.AUTO: ABNORMAL
NITRITE UR QL STRIP: NEGATIVE
PH UR STRIP.AUTO: 6 [PH] (ref 5–8)
POTASSIUM SERPL-SCNC: 4.6 MMOL/L (ref 3.5–5.2)
PROT SERPL-MCNC: 7.2 G/DL (ref 6–8.5)
PROT UR QL STRIP: NEGATIVE
RBC # UR: ABNORMAL /HPF
REF LAB TEST METHOD: ABNORMAL
SODIUM SERPL-SCNC: 138 MMOL/L (ref 136–145)
SP GR UR STRIP: 1.02 (ref 1–1.03)
SQUAMOUS #/AREA URNS HPF: ABNORMAL /HPF
TRIGL SERPL-MCNC: 90 MG/DL (ref 0–150)
UROBILINOGEN UR QL STRIP: ABNORMAL
VLDLC SERPL-MCNC: 17 MG/DL (ref 5–40)
WBC UR QL AUTO: ABNORMAL /HPF

## 2021-09-14 PROCEDURE — 80053 COMPREHEN METABOLIC PANEL: CPT

## 2021-09-14 PROCEDURE — 80061 LIPID PANEL: CPT

## 2021-09-14 PROCEDURE — 81001 URINALYSIS AUTO W/SCOPE: CPT

## 2021-09-14 PROCEDURE — 36415 COLL VENOUS BLD VENIPUNCTURE: CPT

## 2021-09-20 ENCOUNTER — OFFICE VISIT (OUTPATIENT)
Dept: FAMILY MEDICINE CLINIC | Facility: CLINIC | Age: 61
End: 2021-09-20

## 2021-09-20 VITALS
BODY MASS INDEX: 25.03 KG/M2 | HEART RATE: 81 BPM | OXYGEN SATURATION: 99 % | HEIGHT: 64 IN | WEIGHT: 146.6 LBS | SYSTOLIC BLOOD PRESSURE: 126 MMHG | DIASTOLIC BLOOD PRESSURE: 82 MMHG

## 2021-09-20 DIAGNOSIS — Z00.00 ANNUAL PHYSICAL EXAM: Primary | ICD-10-CM

## 2021-09-20 DIAGNOSIS — Z12.11 SCREENING FOR COLON CANCER: ICD-10-CM

## 2021-09-20 DIAGNOSIS — Z01.419 ENCOUNTER FOR GYNECOLOGICAL EXAMINATION WITHOUT ABNORMAL FINDING: ICD-10-CM

## 2021-09-20 DIAGNOSIS — K21.9 GERD WITHOUT ESOPHAGITIS: ICD-10-CM

## 2021-09-20 PROCEDURE — 99396 PREV VISIT EST AGE 40-64: CPT | Performed by: GENERAL PRACTICE

## 2021-09-20 RX ORDER — OMEPRAZOLE 40 MG/1
40 CAPSULE, DELAYED RELEASE ORAL DAILY
Qty: 90 CAPSULE | Refills: 1 | Status: SHIPPED | OUTPATIENT
Start: 2021-09-20 | End: 2022-03-15 | Stop reason: SDUPTHER

## 2021-09-20 RX ORDER — CLOTRIMAZOLE 1 G/ML
SOLUTION TOPICAL
Qty: 30 ML | Refills: 0 | Status: SHIPPED | OUTPATIENT
Start: 2021-09-20 | End: 2021-12-17

## 2021-09-20 NOTE — PROGRESS NOTES
Subjective   Belen Larios is a 61 y.o. female.     Chief Complaint   Patient presents with   • Annual Exam     [place]       History of Present Illness     For annual wellness exam. Records reviewed. Recent labs, xrays reviewed and medications reconciled.  Due for mammogram. Has itching in left ear. Is having some GERD symptoms, taking over the counter omeprazole which has helped but not completely resolved.    The following portions of the patient's history were reviewed and updated as appropriate: allergies, current medications, past family and social history and problem list.    Outpatient Medications Prior to Visit   Medication Sig Dispense Refill   • Calcium Carb-Cholecalciferol (CALCIUM CARBONATE-VITAMIN D3) 600-400 MG-UNIT tablet Take 600 mg by mouth daily.     • lovastatin (MEVACOR) 40 MG tablet Take 1 tablet by mouth Every Night. 90 tablet 3     No facility-administered medications prior to visit.     Review of Systems   Constitutional: Negative.  Negative for chills, fatigue, fever and unexpected weight change.   HENT: Negative.  Negative for congestion, ear pain, hearing loss, nosebleeds, rhinorrhea, sneezing, sore throat and tinnitus.    Eyes: Negative.  Negative for discharge.   Respiratory: Negative.  Negative for cough, shortness of breath and wheezing.    Cardiovascular: Negative.  Negative for chest pain and palpitations.   Gastrointestinal: Negative.  Negative for abdominal pain, constipation, diarrhea, nausea and vomiting.   Endocrine: Negative.    Genitourinary: Negative.  Negative for dysuria, frequency and urgency.   Musculoskeletal: Negative.  Negative for arthralgias, back pain, joint swelling, myalgias and neck pain.   Skin: Negative.  Negative for rash.   Allergic/Immunologic: Negative.    Neurological: Negative.  Negative for dizziness, weakness, numbness and headaches.   Hematological: Negative.  Negative for adenopathy.   Psychiatric/Behavioral: Negative.  Negative for dysphoric mood  "and sleep disturbance. The patient is not nervous/anxious.      I have reviewed 12 systems with patient. Findings were negative except what is noted below and/or in history of present illness.    Objective     Visit Vitals  /82   Pulse 81   Ht 162.6 cm (64\")   Wt 66.5 kg (146 lb 9.6 oz)   LMP  (LMP Unknown)   SpO2 99%   BMI 25.16 kg/m²     Physical Exam  Vitals and nursing note reviewed.   Constitutional:       General: She is not in acute distress.     Appearance: She is well-developed.   HENT:      Head: Normocephalic and atraumatic.      Nose: Nose normal.   Eyes:      General:         Right eye: No discharge.         Left eye: No discharge.      Conjunctiva/sclera: Conjunctivae normal.      Pupils: Pupils are equal, round, and reactive to light.   Neck:      Thyroid: No thyromegaly.      Trachea: No tracheal deviation.   Cardiovascular:      Rate and Rhythm: Normal rate and regular rhythm.      Heart sounds: Normal heart sounds. No murmur heard.     Pulmonary:      Effort: Pulmonary effort is normal. No respiratory distress.      Breath sounds: Normal breath sounds. No wheezing or rales.   Chest:      Chest wall: No tenderness.      Breasts:         Right: No inverted nipple, mass, nipple discharge, skin change or tenderness.         Left: No inverted nipple, mass, nipple discharge, skin change or tenderness.   Abdominal:      General: Bowel sounds are normal. There is no distension.      Palpations: Abdomen is soft. There is no mass.      Tenderness: There is no abdominal tenderness.      Hernia: No hernia is present.   Musculoskeletal:         General: No deformity. Normal range of motion.   Lymphadenopathy:      Cervical: No cervical adenopathy.   Skin:     General: Skin is warm and dry.   Neurological:      Mental Status: She is alert and oriented to person, place, and time.      Deep Tendon Reflexes: Reflexes are normal and symmetric.   Psychiatric:         Behavior: Behavior normal.         Thought " Content: Thought content normal.         Judgment: Judgment normal.       Results for orders placed or performed in visit on 09/14/21   Comprehensive Metabolic Panel    Specimen: Blood   Result Value Ref Range    Glucose 101 (H) 65 - 99 mg/dL    BUN 16 8 - 23 mg/dL    Creatinine 0.79 0.57 - 1.00 mg/dL    Sodium 138 136 - 145 mmol/L    Potassium 4.6 3.5 - 5.2 mmol/L    Chloride 103 98 - 107 mmol/L    CO2 23.7 22.0 - 29.0 mmol/L    Calcium 9.6 8.6 - 10.5 mg/dL    Total Protein 7.2 6.0 - 8.5 g/dL    Albumin 4.90 3.50 - 5.20 g/dL    ALT (SGPT) 14 1 - 33 U/L    AST (SGOT) 14 1 - 32 U/L    Alkaline Phosphatase 47 39 - 117 U/L    Total Bilirubin 0.6 0.0 - 1.2 mg/dL    eGFR Non African Amer 74 >60 mL/min/1.73    Globulin 2.3 gm/dL    A/G Ratio 2.1 g/dL    BUN/Creatinine Ratio 20.3 7.0 - 25.0    Anion Gap 11.3 5.0 - 15.0 mmol/L   Lipid Panel    Specimen: Blood   Result Value Ref Range    Total Cholesterol 179 0 - 200 mg/dL    Triglycerides 90 0 - 150 mg/dL    HDL Cholesterol 47 40 - 60 mg/dL    LDL Cholesterol  115 (H) 0 - 100 mg/dL    VLDL Cholesterol 17 5 - 40 mg/dL    LDL/HDL Ratio 2.43    Urinalysis without microscopic (no culture) - Urine, Clean Catch    Specimen: Urine, Clean Catch   Result Value Ref Range    Color, UA Yellow Yellow, Straw    Appearance, UA Clear Clear    pH, UA 6.0 5.0 - 8.0    Specific Gravity, UA 1.023 1.005 - 1.030    Glucose, UA Negative Negative    Ketones, UA Trace (A) Negative    Bilirubin, UA Negative Negative    Blood, UA Negative Negative    Protein, UA Negative Negative    Leuk Esterase, UA Small (1+) (A) Negative    Nitrite, UA Negative Negative    Urobilinogen, UA 0.2 E.U./dL 0.2 - 1.0 E.U./dL   Urinalysis, Microscopic Only - Urine, Clean Catch    Specimen: Urine, Clean Catch   Result Value Ref Range    RBC, UA 0-2 None Seen, 0-2 /HPF    WBC, UA 3-5 (A) None Seen, 0-2 /HPF    Bacteria, UA 3+ (A) None Seen /HPF    Squamous Epithelial Cells, UA 3-6 (A) None Seen, 0-2 /HPF    Hyaline Casts,  UA None Seen None Seen /LPF    Methodology Automated Microscopy       Notes brought forward are reviewed and updated if indicated.     Assessment/Plan   Problems Addressed this Visit     None      Visit Diagnoses     Annual physical exam    -  Primary    Relevant Orders    Comprehensive Metabolic Panel    Lipid Panel    Urinalysis With Culture If Indicated -    Screening for colon cancer        Relevant Orders    Cologuard - Stool, Per Rectum    GERD without esophagitis        Relevant Medications    omeprazole (priLOSEC) 40 MG capsule    Encounter for gynecological examination without abnormal finding        Relevant Orders    Liquid-based Pap Smear, Screening      Diagnoses       Codes Comments    Annual physical exam    -  Primary ICD-10-CM: Z00.00  ICD-9-CM: V70.0     Screening for colon cancer     ICD-10-CM: Z12.11  ICD-9-CM: V76.51     GERD without esophagitis     ICD-10-CM: K21.9  ICD-9-CM: 530.81     Encounter for gynecological examination without abnormal finding     ICD-10-CM: Z01.419  ICD-9-CM: V72.31           Will notify regarding results. Continue current medications. Increase omeprazole. Recheck if not improving.    Age-appropriate counseling is provided.     New Medications Ordered This Visit   Medications   • omeprazole (priLOSEC) 40 MG capsule     Sig: Take 1 capsule by mouth Daily.     Dispense:  90 capsule     Refill:  1   • clotrimazole (LOTRIMIN) 1 % external solution     Si gtts left ear bid     Dispense:  10 mL     Refill:  0     Return in about 1 year (around 2022) for Annual physical.        This document has been electronically signed by Cathleen Lucio MD on 2021 10:28 CDT

## 2021-09-23 LAB
LAB AP CASE REPORT: NORMAL
PATH INTERP SPEC-IMP: NORMAL

## 2021-10-12 DIAGNOSIS — H92.02 LEFT EAR PAIN: Primary | ICD-10-CM

## 2021-10-16 ENCOUNTER — FLU SHOT (OUTPATIENT)
Dept: FAMILY MEDICINE CLINIC | Facility: CLINIC | Age: 61
End: 2021-10-16

## 2021-10-16 DIAGNOSIS — Z23 NEED FOR INFLUENZA VACCINATION: Primary | ICD-10-CM

## 2021-11-22 ENCOUNTER — LAB (OUTPATIENT)
Dept: LAB | Facility: HOSPITAL | Age: 61
End: 2021-11-22

## 2021-11-22 ENCOUNTER — OFFICE VISIT (OUTPATIENT)
Dept: FAMILY MEDICINE CLINIC | Facility: CLINIC | Age: 61
End: 2021-11-22

## 2021-11-22 VITALS
DIASTOLIC BLOOD PRESSURE: 80 MMHG | BODY MASS INDEX: 25.81 KG/M2 | OXYGEN SATURATION: 99 % | WEIGHT: 151.2 LBS | HEIGHT: 64 IN | HEART RATE: 67 BPM | SYSTOLIC BLOOD PRESSURE: 130 MMHG

## 2021-11-22 DIAGNOSIS — Z00.00 ANNUAL PHYSICAL EXAM: ICD-10-CM

## 2021-11-22 DIAGNOSIS — R10.13 EPIGASTRIC PAIN: ICD-10-CM

## 2021-11-22 DIAGNOSIS — K21.9 GERD WITHOUT ESOPHAGITIS: ICD-10-CM

## 2021-11-22 DIAGNOSIS — R10.13 EPIGASTRIC PAIN: Primary | ICD-10-CM

## 2021-11-22 LAB
ALBUMIN SERPL-MCNC: 5 G/DL (ref 3.5–5.2)
ALBUMIN/GLOB SERPL: 1.9 G/DL
ALP SERPL-CCNC: 59 U/L (ref 39–117)
ALT SERPL W P-5'-P-CCNC: 17 U/L (ref 1–33)
AMYLASE SERPL-CCNC: 42 U/L (ref 28–100)
ANION GAP SERPL CALCULATED.3IONS-SCNC: 6 MMOL/L (ref 5–15)
AST SERPL-CCNC: 19 U/L (ref 1–32)
BILIRUB SERPL-MCNC: 0.4 MG/DL (ref 0–1.2)
BUN SERPL-MCNC: 21 MG/DL (ref 8–23)
BUN/CREAT SERPL: 23.9 (ref 7–25)
CALCIUM SPEC-SCNC: 10.1 MG/DL (ref 8.6–10.5)
CHLORIDE SERPL-SCNC: 104 MMOL/L (ref 98–107)
CHOLEST SERPL-MCNC: 182 MG/DL (ref 0–200)
CO2 SERPL-SCNC: 28 MMOL/L (ref 22–29)
CREAT SERPL-MCNC: 0.88 MG/DL (ref 0.57–1)
GFR SERPL CREATININE-BSD FRML MDRD: 65 ML/MIN/1.73
GLOBULIN UR ELPH-MCNC: 2.7 GM/DL
GLUCOSE SERPL-MCNC: 129 MG/DL (ref 65–99)
HDLC SERPL-MCNC: 54 MG/DL (ref 40–60)
LDLC SERPL CALC-MCNC: 107 MG/DL (ref 0–100)
LDLC/HDLC SERPL: 1.94 {RATIO}
LIPASE SERPL-CCNC: 31 U/L (ref 13–60)
POTASSIUM SERPL-SCNC: 5 MMOL/L (ref 3.5–5.2)
PROT SERPL-MCNC: 7.7 G/DL (ref 6–8.5)
SODIUM SERPL-SCNC: 138 MMOL/L (ref 136–145)
TRIGL SERPL-MCNC: 117 MG/DL (ref 0–150)
VLDLC SERPL-MCNC: 21 MG/DL (ref 5–40)

## 2021-11-22 PROCEDURE — 82150 ASSAY OF AMYLASE: CPT | Performed by: GENERAL PRACTICE

## 2021-11-22 PROCEDURE — 83690 ASSAY OF LIPASE: CPT

## 2021-11-22 PROCEDURE — 36415 COLL VENOUS BLD VENIPUNCTURE: CPT | Performed by: GENERAL PRACTICE

## 2021-11-22 PROCEDURE — 99214 OFFICE O/P EST MOD 30 MIN: CPT | Performed by: GENERAL PRACTICE

## 2021-11-22 PROCEDURE — 80061 LIPID PANEL: CPT

## 2021-11-22 PROCEDURE — 80053 COMPREHEN METABOLIC PANEL: CPT | Performed by: GENERAL PRACTICE

## 2021-11-22 RX ORDER — SUCRALFATE 1 G/1
1 TABLET ORAL 4 TIMES DAILY
Qty: 120 TABLET | Refills: 2 | Status: SHIPPED | OUTPATIENT
Start: 2021-11-22 | End: 2022-02-18 | Stop reason: SDUPTHER

## 2021-11-22 NOTE — PROGRESS NOTES
"Subjective   Belen Larios is a 61 y.o. female.   Chief Complaint   Patient presents with   • Back Pain     epigastric pain         Back Pain       The following portions of the patient's history were reviewed and updated as appropriate: allergies, current medications, past social history and problem list.    Outpatient Medications Prior to Visit   Medication Sig Dispense Refill   • Calcium Carb-Cholecalciferol (CALCIUM CARBONATE-VITAMIN D3) 600-400 MG-UNIT tablet Take 600 mg by mouth daily.     • clotrimazole (LOTRIMIN) 1 % external solution Instill 2 drops into the left ear twice daily. 30 mL 0   • lovastatin (MEVACOR) 40 MG tablet Take 1 tablet by mouth Every Night. 90 tablet 3   • omeprazole (priLOSEC) 40 MG capsule Take 1 capsule by mouth Daily. 90 capsule 1     No facility-administered medications prior to visit.       Review of Systems   Musculoskeletal: Positive for back pain.     I have reviewed 12 systems with patient. Findings were negative except what is noted below and/or in history of present illness.     Objective   Visit Vitals  /80   Pulse 67   Ht 162.6 cm (64\")   Wt 68.6 kg (151 lb 3.2 oz)   LMP  (LMP Unknown)   SpO2 99%   BMI 25.95 kg/m²     Physical Exam    Notes brought forward are reviewed and updated if indicated.     Assessment/Plan   Problems Addressed this Visit     None      Diagnoses    None.          No orders of the defined types were placed in this encounter.    No follow-ups on file.        This document has been electronically signed by Cathelen Lucio MD on November 22, 2021 08:57 CST    "

## 2021-11-22 NOTE — PROGRESS NOTES
Subjective   Belen Larios is a 61 y.o. female.   Chief Complaint   Patient presents with   • Back Pain     epigastric pain       Is still having epigastric pain, has not improved with 40 mg of omeprazole.  Radiates through to the back.  She does not have any nausea and has not had any weight loss.  Abdominal Pain  This is a chronic problem. The current episode started more than 1 month ago. The onset quality is gradual. The problem occurs constantly. The problem has been unchanged. The pain is located in the epigastric region. The pain is at a severity of 4/10. The pain is moderate. The quality of the pain is dull and a sensation of fullness. The abdominal pain radiates to the back. Associated symptoms include belching. Pertinent negatives include no constipation, diarrhea, nausea or vomiting. Nothing aggravates the pain. The pain is relieved by nothing. She has tried proton pump inhibitors for the symptoms. The treatment provided mild relief. Prior diagnostic workup includes GI consult, upper endoscopy and ultrasound.      The following portions of the patient's history were reviewed and updated as appropriate: allergies, current medications, past social history and problem list.    Outpatient Medications Prior to Visit   Medication Sig Dispense Refill   • Calcium Carb-Cholecalciferol (CALCIUM CARBONATE-VITAMIN D3) 600-400 MG-UNIT tablet Take 600 mg by mouth daily.     • clotrimazole (LOTRIMIN) 1 % external solution Instill 2 drops into the left ear twice daily. 30 mL 0   • lovastatin (MEVACOR) 40 MG tablet Take 1 tablet by mouth Every Night. 90 tablet 3   • omeprazole (priLOSEC) 40 MG capsule Take 1 capsule by mouth Daily. 90 capsule 1     No facility-administered medications prior to visit.       Review of Systems   Gastrointestinal: Positive for abdominal pain. Negative for constipation, diarrhea, nausea and vomiting.     I have reviewed 12 systems with patient. Findings were negative except what is noted  "below and/or in history of present illness.     Objective   Visit Vitals  /80   Pulse 67   Ht 162.6 cm (64\")   Wt 68.6 kg (151 lb 3.2 oz)   LMP  (LMP Unknown)   SpO2 99%   BMI 25.95 kg/m²     Physical Exam  Constitutional:       General: She is not in acute distress.     Appearance: She is well-developed.   HENT:      Head: Normocephalic and atraumatic.      Nose: Nose normal.   Eyes:      General:         Right eye: No discharge.         Left eye: No discharge.      Conjunctiva/sclera: Conjunctivae normal.      Pupils: Pupils are equal, round, and reactive to light.   Neck:      Thyroid: No thyromegaly.   Cardiovascular:      Rate and Rhythm: Normal rate and regular rhythm.      Heart sounds: Normal heart sounds. No murmur heard.      Pulmonary:      Effort: Pulmonary effort is normal. No respiratory distress.      Breath sounds: Normal breath sounds. No wheezing or rales.   Chest:      Chest wall: No tenderness.   Abdominal:      General: Bowel sounds are normal. There is no distension.      Palpations: Abdomen is soft. There is no mass.      Tenderness: There is abdominal tenderness (mild) in the epigastric area. There is no guarding or rebound.      Hernia: No hernia is present.   Musculoskeletal:         General: No deformity. Normal range of motion.      Cervical back: Normal range of motion.   Lymphadenopathy:      Cervical: No cervical adenopathy.   Skin:     General: Skin is warm and dry.      Coloration: Skin is not pale.      Findings: No rash.   Neurological:      Mental Status: She is alert and oriented to person, place, and time.      Deep Tendon Reflexes: Reflexes are normal and symmetric.   Psychiatric:         Behavior: Behavior normal.         Thought Content: Thought content normal.         Judgment: Judgment normal.       Notes brought forward are reviewed and updated if indicated.     Assessment/Plan   Problems Addressed this Visit     None      Visit Diagnoses     Epigastric pain    -  " Primary    Relevant Medications    sucralfate (Carafate) 1 g tablet    Other Relevant Orders    US Abdomen Complete    Amylase    Lipase    Comprehensive Metabolic Panel    GERD without esophagitis        Relevant Medications    sucralfate (Carafate) 1 g tablet    Other Relevant Orders    US Abdomen Complete      Diagnoses       Codes Comments    Epigastric pain    -  Primary ICD-10-CM: R10.13  ICD-9-CM: 789.06     GERD without esophagitis     ICD-10-CM: K21.9  ICD-9-CM: 530.81           Will notify regarding results. Start carafate. Gastro refferal.    New Medications Ordered This Visit   Medications   • sucralfate (Carafate) 1 g tablet     Sig: Take 1 tablet by mouth 4 (Four) Times a Day.     Dispense:  120 tablet     Refill:  2     Return if symptoms worsen or fail to improve, for Next scheduled follow up.        This document has been electronically signed by Cathleen Lucio MD on November 22, 2021 09:16 CST

## 2021-11-24 ENCOUNTER — HOSPITAL ENCOUNTER (OUTPATIENT)
Dept: ULTRASOUND IMAGING | Facility: HOSPITAL | Age: 61
Discharge: HOME | End: 2021-11-24
Admitting: GENERAL PRACTICE

## 2021-11-24 DIAGNOSIS — R10.13 EPIGASTRIC PAIN: ICD-10-CM

## 2021-11-24 DIAGNOSIS — K21.9 GERD WITHOUT ESOPHAGITIS: ICD-10-CM

## 2021-11-24 PROCEDURE — 76700 US EXAM ABDOM COMPLETE: CPT

## 2021-11-30 DIAGNOSIS — R10.13 EPIGASTRIC PAIN: Primary | ICD-10-CM

## 2021-12-01 ENCOUNTER — OFFICE VISIT (OUTPATIENT)
Dept: GASTROENTEROLOGY | Facility: CLINIC | Age: 61
End: 2021-12-01

## 2021-12-01 VITALS
HEART RATE: 82 BPM | WEIGHT: 149.4 LBS | BODY MASS INDEX: 25.51 KG/M2 | DIASTOLIC BLOOD PRESSURE: 68 MMHG | SYSTOLIC BLOOD PRESSURE: 110 MMHG | HEIGHT: 64 IN

## 2021-12-01 DIAGNOSIS — K21.00 GASTROESOPHAGEAL REFLUX DISEASE WITH ESOPHAGITIS WITHOUT HEMORRHAGE: Primary | ICD-10-CM

## 2021-12-01 DIAGNOSIS — R10.13 EPIGASTRIC PAIN: ICD-10-CM

## 2021-12-01 PROCEDURE — 99213 OFFICE O/P EST LOW 20 MIN: CPT | Performed by: NURSE PRACTITIONER

## 2021-12-01 RX ORDER — DEXTROSE AND SODIUM CHLORIDE 5; .45 G/100ML; G/100ML
30 INJECTION, SOLUTION INTRAVENOUS CONTINUOUS PRN
Status: CANCELLED | OUTPATIENT
Start: 2021-12-21

## 2021-12-01 NOTE — H&P (VIEW-ONLY)
Chief Complaint   Patient presents with   • Heartburn       Subjective    Belen Larios is a 61 y.o. female. she is here today   61-year-old female presents to discuss worsening reflux.  States it started about 4 months ago and has recently worsened.  She has tried omeprazole Carafate diet modifications with persistent symptoms.  Describes epigastric constant dull ache with intermittent sharp pain that radiates to her back.  She had ultrasound which showed normal gallbladder lab work was unremarkable.  She recently had negative Cologuard    Heartburn  She complains of abdominal pain, belching, globus sensation and heartburn. She reports no chest pain, no choking, no coughing, no dysphagia, no early satiety, no hoarse voice, no nausea, no sore throat, no stridor, no tooth decay, no water brash or no wheezing. This is a chronic problem. The current episode started more than 1 month ago (about 4 months ago ). The heartburn is located in the substernum (straight through to back ). The heartburn is of moderate intensity. The heartburn wakes her from sleep. The heartburn limits her activity. The heartburn changes with position. The symptoms are aggravated by certain foods. Associated symptoms include fatigue.     Plan; schedule patient for EGD with biopsy discussed concern for peptic ulcer,  Jensen's esophagus or H. pylori gastritis will obtain biopsies to rule these out.  Follow-up after test return office sooner if needed       The following portions of the patient's history were reviewed and updated as appropriate:   Past Medical History:   Diagnosis Date   • Acute upper respiratory infection    • Allergic rhinitis    • Backache     Backache - possible IT band      • Cough    • Encounter for general adult medical examination without abnormal findings    • Fibrocystic breast    • Health maintenance examination     Adult health examination      • History of screening mammography     Screening mammography      •  Hyperlipidemia    • Hyperthyroidism     Hyperthyroidism - hx of same     • Pharyngitis    • Rhinitis    • Sacroiliitis (HCC)     Sacroiliitis, not elsewhere classified      • Skin lesion     Skin lesion - lesion right cheek      • Upper respiratory infection    • Visit for gynecologic examination      Past Surgical History:   Procedure Laterality Date   • CERVICAL SPINE SURGERY  10/28/2010    Cervical spine disk surgery (1)   • CLAVICLE OPEN REDUCTION INTERNAL FIXATION Right 10/19/2018    Procedure: CLAVICLE OPEN REDUCTION INTERNAL FIXATION with iliac crest bone graft right side.     (C-ARM#3);  Surgeon: Phillip Grant MD;  Location: [facility];  Service: Orthopedics   • COLONOSCOPY  10/27/2011    Colonoscopy, diagnostic (screening) 58324 (1)      • ENDOSCOPY  2008    EGD w/ tube 40518 (1)    Hypopharynx, esophagus,NRL. 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 Multiple cold biop was obtained from the antrum. A single cold biop was obtained & placed on PyloriTek strip. Pylorus, duodenum NRL.    • INJECTION OF MEDICATION  2015    Kenalog (4)      • INJECTION OF MEDICATION  2015    Rocephin (2)      • INJECTION OF MEDICATION  2015    Solu-Medrol (1)     • PAP SMEAR  2015    WNL, CARD SENT, DR. RAMON'S OFFICE   • PROCEDURE GENERIC CONVERTED  2016    Consult, Dermatology (1)        Family History   Problem Relation Age of Onset   • Diabetes Mother    • Lung disease Father    • Esophageal cancer Paternal Uncle    • Cancer Other    • Hypertension Other    • Other Other    • Breast cancer Paternal Aunt      OB History        2    Para   2    Term   2            AB        Living           SAB        IAB        Ectopic        Molar        Multiple        Live Births                  Prior to Admission medications    Medication Sig Start Date End Date Taking? Authorizing Provider   Calcium Carb-Cholecalciferol (CALCIUM CARBONATE-VITAMIN D3) 600-400 MG-UNIT tablet Take 600 mg by  "mouth daily.   Yes Provider, MD Ronda   clotrimazole (LOTRIMIN) 1 % external solution Instill 2 drops into the left ear twice daily. 9/20/21  Yes Cathleen Lucio MD   lovastatin (MEVACOR) 40 MG tablet Take 1 tablet by mouth Every Night. 9/8/21  Yes Cathleen Lucio MD   omeprazole (priLOSEC) 40 MG capsule Take 1 capsule by mouth Daily. 9/20/21  Yes Cathleen Lucio MD   sucralfate (Carafate) 1 g tablet Take 1 tablet by mouth 4 (Four) Times a Day. 11/22/21  Yes Cathleen Lucio MD   traZODone (DESYREL) 50 MG tablet Take 1 tablet by mouth every night. 9/8/21 9/14/21  Cathleen Lucio MD     Allergies   Allergen Reactions   • Sulfa Antibiotics Rash     Social History     Socioeconomic History   • Marital status:    Tobacco Use   • Smoking status: Never Smoker   • Smokeless tobacco: Never Used   Substance and Sexual Activity   • Alcohol use: Yes     Comment: occasionally   • Drug use: No   • Sexual activity: Defer       Review of Systems  Review of Systems   Constitutional: Positive for fatigue.   HENT: Negative for hoarse voice and sore throat.    Respiratory: Negative for cough, choking and wheezing.    Cardiovascular: Negative for chest pain.   Gastrointestinal: Positive for abdominal pain and heartburn. Negative for dysphagia and nausea.        /68 (BP Location: Left arm)   Pulse 82   Ht 162.6 cm (64\")   Wt 67.8 kg (149 lb 6.4 oz)   LMP  (LMP Unknown)   BMI 25.64 kg/m²     Objective    Physical Exam  Constitutional:       General: She is not in acute distress.     Appearance: Normal appearance. She is normal weight. She is not ill-appearing.   HENT:      Head: Normocephalic and atraumatic.   Pulmonary:      Effort: Pulmonary effort is normal.   Abdominal:      General: Bowel sounds are normal. There is no distension.      Palpations: Abdomen is soft. There is no mass.      Tenderness: There is abdominal tenderness in the epigastric area.   Neurological:      Mental Status: She is " alert.       Lab on 11/22/2021   Component Date Value Ref Range Status   • Total Cholesterol 11/22/2021 182  0 - 200 mg/dL Final   • Triglycerides 11/22/2021 117  0 - 150 mg/dL Final   • HDL Cholesterol 11/22/2021 54  40 - 60 mg/dL Final   • LDL Cholesterol  11/22/2021 107* 0 - 100 mg/dL Final   • VLDL Cholesterol 11/22/2021 21  5 - 40 mg/dL Final   • LDL/HDL Ratio 11/22/2021 1.94   Final   • Lipase 11/22/2021 31  13 - 60 U/L Final     Assessment/Plan      1. Gastroesophageal reflux disease with esophagitis without hemorrhage    2. Epigastric pain    .       Orders placed during this encounter include:  Orders Placed This Encounter   Procedures   • COVID PRE-OP / PRE-PROCEDURE SCREENING ORDER (NO ISOLATION) - Swab, Nasopharynx     Standing Status:   Future     Standing Expiration Date:   12/1/2022     Order Specific Question:   Release to patient     Answer:   Immediate     Order Specific Question:   Please select your location:     Answer:   [facility]     Order Specific Question:   COVID Screening Order:     Answer:   In-House: PRE-OP: BD MAX, 8-10 HR TAT [[id number]]     Order Specific Question:   Previously tested for COVID-19?     Answer:   No     Order Specific Question:   Employed in healthcare setting?     Answer:   No     Order Specific Question:   Symptomatic for COVID-19 as defined by CDC?     Answer:   No     Order Specific Question:   Hospitalized for COVID-19?     Answer:   No     Order Specific Question:   Admitted to ICU for COVID-19?     Answer:   No     Order Specific Question:   Resident in a congregate (group) care setting?     Answer:   No     Order Specific Question:   Pregnant?     Answer:   No   • Follow Anesthesia Guidelines / Standing Orders     Standing Status:   Future   • Obtain Informed Consent     Standing Status:   Future     Order Specific Question:   Informed Consent Given For     Answer:   ESOPHAGOGASTRODUODENOSCOPY possible dilation       ESOPHAGOGASTRODUODENOSCOPY possible  dilation (N/A)    Review and/or summary of lab tests, radiology, procedures, medications. Review and summary of old records and obtaining of history. The risks and benefits of my recommendations, as well as other treatment options were discussed with the patient today. Questions were answered.    No orders of the defined types were placed in this encounter.      Follow-up: Return in about 4 weeks (around 12/29/2021) for Recheck, After test.          This document has been electronically signed by SHARYN Dorantes on December 1, 2021 13:24 CST           I spent 16 minutes caring for Belen on this date of service. This time includes time spent by me in the following activities:preparing for the visit, reviewing tests, obtaining and/or reviewing a separately obtained history, performing a medically appropriate examination and/or evaluation , counseling and educating the patient/family/caregiver, ordering medications, tests, or procedures, referring and communicating with other health care professionals , documenting information in the medical record and care coordination    Results for orders placed or performed in visit on 11/22/21   Lipase    Specimen: Blood   Result Value Ref Range    Lipase 31 13 - 60 U/L   Lipid Panel    Specimen: Blood   Result Value Ref Range    Total Cholesterol 182 0 - 200 mg/dL    Triglycerides 117 0 - 150 mg/dL    HDL Cholesterol 54 40 - 60 mg/dL    LDL Cholesterol  107 (H) 0 - 100 mg/dL    VLDL Cholesterol 21 5 - 40 mg/dL    LDL/HDL Ratio 1.94    Results for orders placed or performed in visit on 11/22/21   Amylase    Specimen: Blood   Result Value Ref Range    Amylase 42 28 - 100 U/L   Comprehensive Metabolic Panel    Specimen: Blood   Result Value Ref Range    Glucose 129 (H) 65 - 99 mg/dL    BUN 21 8 - 23 mg/dL    Creatinine 0.88 0.57 - 1.00 mg/dL    Sodium 138 136 - 145 mmol/L    Potassium 5.0 3.5 - 5.2 mmol/L    Chloride 104 98 - 107 mmol/L    CO2 28.0 22.0 - 29.0 mmol/L    Calcium  10.1 8.6 - 10.5 mg/dL    Total Protein 7.7 6.0 - 8.5 g/dL    Albumin 5.00 3.50 - 5.20 g/dL    ALT (SGPT) 17 1 - 33 U/L    AST (SGOT) 19 1 - 32 U/L    Alkaline Phosphatase 59 39 - 117 U/L    Total Bilirubin 0.4 0.0 - 1.2 mg/dL    eGFR Non African Amer 65 >60 mL/min/1.73    Globulin 2.7 gm/dL    A/G Ratio 1.9 g/dL    BUN/Creatinine Ratio 23.9 7.0 - 25.0    Anion Gap 6.0 5.0 - 15.0 mmol/L   Results for orders placed or performed in visit on 09/20/21   Liquid-based Pap Smear, Screening    Specimen: Cervix; ThinPrep Vial   Result Value Ref Range    Case Report       Gynecologic Cytology Report                       Case: [id number]                                  Authorizing Provider:  Cathleen Lucio MD      Collected:           09/20/2021 10:00 AM          Ordering Location:     [facility]   Received:            09/20/2021 02:14 PM                                 [facility]                                                                          [facility]                                                               First Screen:          Ranjeet Fernando                                                              Specimen:    Sendout to [facility], Cervix                                                                     Interpretation See attached report    Results for orders placed or performed in visit on 09/14/21   Urinalysis, Microscopic Only - Urine, Clean Catch    Specimen: Urine, Clean Catch   Result Value Ref Range    RBC, UA 0-2 None Seen, 0-2 /HPF    WBC, UA 3-5 (A) None Seen, 0-2 /HPF    Bacteria, UA 3+ (A) None Seen /HPF    Squamous Epithelial Cells, UA 3-6 (A) None Seen, 0-2 /HPF    Hyaline Casts, UA None Seen None Seen /LPF    Methodology Automated Microscopy    Urinalysis without microscopic (no culture) - Urine, Clean Catch    Specimen: Urine, Clean Catch   Result Value Ref Range    Color, UA Yellow Yellow, Straw    Appearance, UA Clear Clear    pH, UA 6.0 5.0 - 8.0     Specific Gravity, UA 1.023 1.005 - 1.030    Glucose, UA Negative Negative    Ketones, UA Trace (A) Negative    Bilirubin, UA Negative Negative    Blood, UA Negative Negative    Protein, UA Negative Negative    Leuk Esterase, UA Small (1+) (A) Negative    Nitrite, UA Negative Negative    Urobilinogen, UA 0.2 E.U./dL 0.2 - 1.0 E.U./dL   Lipid Panel    Specimen: Blood   Result Value Ref Range    Total Cholesterol 179 0 - 200 mg/dL    Triglycerides 90 0 - 150 mg/dL    HDL Cholesterol 47 40 - 60 mg/dL    LDL Cholesterol  115 (H) 0 - 100 mg/dL    VLDL Cholesterol 17 5 - 40 mg/dL    LDL/HDL Ratio 2.43    Comprehensive Metabolic Panel    Specimen: Blood   Result Value Ref Range    Glucose 101 (H) 65 - 99 mg/dL    BUN 16 8 - 23 mg/dL    Creatinine 0.79 0.57 - 1.00 mg/dL    Sodium 138 136 - 145 mmol/L    Potassium 4.6 3.5 - 5.2 mmol/L    Chloride 103 98 - 107 mmol/L    CO2 23.7 22.0 - 29.0 mmol/L    Calcium 9.6 8.6 - 10.5 mg/dL    Total Protein 7.2 6.0 - 8.5 g/dL    Albumin 4.90 3.50 - 5.20 g/dL    ALT (SGPT) 14 1 - 33 U/L    AST (SGOT) 14 1 - 32 U/L    Alkaline Phosphatase 47 39 - 117 U/L    Total Bilirubin 0.6 0.0 - 1.2 mg/dL    eGFR Non African Amer 74 >60 mL/min/1.73    Globulin 2.3 gm/dL    A/G Ratio 2.1 g/dL    BUN/Creatinine Ratio 20.3 7.0 - 25.0    Anion Gap 11.3 5.0 - 15.0 mmol/L   Results for orders placed or performed in visit on 09/10/20   Urinalysis without microscopic (no culture) - Urine, Clean Catch    Specimen: Urine, Clean Catch   Result Value Ref Range    Color, UA Yellow Yellow, Straw    Appearance, UA Clear Clear    pH, UA 6.5 5.0 - 8.0    Specific Gravity, UA 1.007 1.005 - 1.030    Glucose, UA Negative Negative    Ketones, UA Negative Negative    Bilirubin, UA Negative Negative    Blood, UA Trace (A) Negative    Protein, UA Negative Negative    Leuk Esterase, UA Moderate (2+) (A) Negative    Nitrite, UA Negative Negative    Urobilinogen, UA 0.2 E.U./dL 0.2 - 1.0 E.U./dL   Lipid Panel    Specimen: Blood    Result Value Ref Range    Total Cholesterol 219 (H) 0 - 200 mg/dL    Triglycerides 82 0 - 150 mg/dL    HDL Cholesterol 55 40 - 60 mg/dL    LDL Cholesterol  148 (H) 0 - 100 mg/dL    VLDL Cholesterol 16.4 5 - 40 mg/dL    LDL/HDL Ratio 2.68    Comprehensive Metabolic Panel    Specimen: Blood   Result Value Ref Range    Glucose 104 (H) 65 - 99 mg/dL    BUN 11 6 - 20 mg/dL    Creatinine 0.72 0.57 - 1.00 mg/dL    Sodium 138 136 - 145 mmol/L    Potassium 4.1 3.5 - 5.2 mmol/L    Chloride 101 98 - 107 mmol/L    CO2 23.4 22.0 - 29.0 mmol/L    Calcium 9.9 8.6 - 10.5 mg/dL    Total Protein 7.8 6.0 - 8.5 g/dL    Albumin 5.30 (H) 3.50 - 5.20 g/dL    ALT (SGPT) 18 1 - 33 U/L    AST (SGOT) 19 1 - 32 U/L    Alkaline Phosphatase 61 39 - 117 U/L    Total Bilirubin 0.6 0.0 - 1.2 mg/dL    eGFR Non African Amer 83 >60 mL/min/1.73    Globulin 2.5 gm/dL    A/G Ratio 2.1 g/dL    BUN/Creatinine Ratio 15.3 7.0 - 25.0    Anion Gap 13.6 5.0 - 15.0 mmol/L     *Note: Due to a large number of results and/or encounters for the requested time period, some results have not been displayed. A complete set of results can be found in Results Review.

## 2021-12-01 NOTE — PROGRESS NOTES
Chief Complaint   Patient presents with   • Heartburn       Subjective    Belen Larios is a 61 y.o. female. she is here today   61-year-old female presents to discuss worsening reflux.  States it started about 4 months ago and has recently worsened.  She has tried omeprazole Carafate diet modifications with persistent symptoms.  Describes epigastric constant dull ache with intermittent sharp pain that radiates to her back.  She had ultrasound which showed normal gallbladder lab work was unremarkable.  She recently had negative Cologuard    Heartburn  She complains of abdominal pain, belching, globus sensation and heartburn. She reports no chest pain, no choking, no coughing, no dysphagia, no early satiety, no hoarse voice, no nausea, no sore throat, no stridor, no tooth decay, no water brash or no wheezing. This is a chronic problem. The current episode started more than 1 month ago (about 4 months ago ). The heartburn is located in the substernum (straight through to back ). The heartburn is of moderate intensity. The heartburn wakes her from sleep. The heartburn limits her activity. The heartburn changes with position. The symptoms are aggravated by certain foods. Associated symptoms include fatigue.     Plan; schedule patient for EGD with biopsy discussed concern for peptic ulcer,  Jensen's esophagus or H. pylori gastritis will obtain biopsies to rule these out.  Follow-up after test return office sooner if needed       The following portions of the patient's history were reviewed and updated as appropriate:   Past Medical History:   Diagnosis Date   • Acute upper respiratory infection    • Allergic rhinitis    • Backache     Backache - possible IT band      • Cough    • Encounter for general adult medical examination without abnormal findings    • Fibrocystic breast    • Health maintenance examination     Adult health examination      • History of screening mammography     Screening mammography      •  Hyperlipidemia    • Hyperthyroidism     Hyperthyroidism - hx of same     • Pharyngitis    • Rhinitis    • Sacroiliitis (HCC)     Sacroiliitis, not elsewhere classified      • Skin lesion     Skin lesion - lesion right cheek      • Upper respiratory infection    • Visit for gynecologic examination      Past Surgical History:   Procedure Laterality Date   • CERVICAL SPINE SURGERY  10/28/2010    Cervical spine disk surgery (1)   • CLAVICLE OPEN REDUCTION INTERNAL FIXATION Right 10/19/2018    Procedure: CLAVICLE OPEN REDUCTION INTERNAL FIXATION with iliac crest bone graft right side.     (C-ARM#3);  Surgeon: Phillip Grant MD;  Location: [facility];  Service: Orthopedics   • COLONOSCOPY  10/27/2011    Colonoscopy, diagnostic (screening) 00822 (1)      • ENDOSCOPY  2008    EGD w/ tube 88171 (1)    Hypopharynx, esophagus,NRL. 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 Multiple cold biop was obtained from the antrum. A single cold biop was obtained & placed on PyloriTek strip. Pylorus, duodenum NRL.    • INJECTION OF MEDICATION  2015    Kenalog (4)      • INJECTION OF MEDICATION  2015    Rocephin (2)      • INJECTION OF MEDICATION  2015    Solu-Medrol (1)     • PAP SMEAR  2015    WNL, CARD SENT, DR. RAMON'S OFFICE   • PROCEDURE GENERIC CONVERTED  2016    Consult, Dermatology (1)        Family History   Problem Relation Age of Onset   • Diabetes Mother    • Lung disease Father    • Esophageal cancer Paternal Uncle    • Cancer Other    • Hypertension Other    • Other Other    • Breast cancer Paternal Aunt      OB History        2    Para   2    Term   2            AB        Living           SAB        IAB        Ectopic        Molar        Multiple        Live Births                  Prior to Admission medications    Medication Sig Start Date End Date Taking? Authorizing Provider   Calcium Carb-Cholecalciferol (CALCIUM CARBONATE-VITAMIN D3) 600-400 MG-UNIT tablet Take 600 mg by  "mouth daily.   Yes Provider, MD Ronda   clotrimazole (LOTRIMIN) 1 % external solution Instill 2 drops into the left ear twice daily. 9/20/21  Yes Cathleen Lucio MD   lovastatin (MEVACOR) 40 MG tablet Take 1 tablet by mouth Every Night. 9/8/21  Yes Cathleen Lucio MD   omeprazole (priLOSEC) 40 MG capsule Take 1 capsule by mouth Daily. 9/20/21  Yes Cathleen Lucio MD   sucralfate (Carafate) 1 g tablet Take 1 tablet by mouth 4 (Four) Times a Day. 11/22/21  Yes Cathleen Lucio MD   traZODone (DESYREL) 50 MG tablet Take 1 tablet by mouth every night. 9/8/21 9/14/21  Cathleen Lucio MD     Allergies   Allergen Reactions   • Sulfa Antibiotics Rash     Social History     Socioeconomic History   • Marital status:    Tobacco Use   • Smoking status: Never Smoker   • Smokeless tobacco: Never Used   Substance and Sexual Activity   • Alcohol use: Yes     Comment: occasionally   • Drug use: No   • Sexual activity: Defer       Review of Systems  Review of Systems   Constitutional: Positive for fatigue.   HENT: Negative for hoarse voice and sore throat.    Respiratory: Negative for cough, choking and wheezing.    Cardiovascular: Negative for chest pain.   Gastrointestinal: Positive for abdominal pain and heartburn. Negative for dysphagia and nausea.        /68 (BP Location: Left arm)   Pulse 82   Ht 162.6 cm (64\")   Wt 67.8 kg (149 lb 6.4 oz)   LMP  (LMP Unknown)   BMI 25.64 kg/m²     Objective    Physical Exam  Constitutional:       General: She is not in acute distress.     Appearance: Normal appearance. She is normal weight. She is not ill-appearing.   HENT:      Head: Normocephalic and atraumatic.   Pulmonary:      Effort: Pulmonary effort is normal.   Abdominal:      General: Bowel sounds are normal. There is no distension.      Palpations: Abdomen is soft. There is no mass.      Tenderness: There is abdominal tenderness in the epigastric area.   Neurological:      Mental Status: She is " alert.       Lab on 11/22/2021   Component Date Value Ref Range Status   • Total Cholesterol 11/22/2021 182  0 - 200 mg/dL Final   • Triglycerides 11/22/2021 117  0 - 150 mg/dL Final   • HDL Cholesterol 11/22/2021 54  40 - 60 mg/dL Final   • LDL Cholesterol  11/22/2021 107* 0 - 100 mg/dL Final   • VLDL Cholesterol 11/22/2021 21  5 - 40 mg/dL Final   • LDL/HDL Ratio 11/22/2021 1.94   Final   • Lipase 11/22/2021 31  13 - 60 U/L Final     Assessment/Plan      1. Gastroesophageal reflux disease with esophagitis without hemorrhage    2. Epigastric pain    .       Orders placed during this encounter include:  Orders Placed This Encounter   Procedures   • COVID PRE-OP / PRE-PROCEDURE SCREENING ORDER (NO ISOLATION) - Swab, Nasopharynx     Standing Status:   Future     Standing Expiration Date:   12/1/2022     Order Specific Question:   Release to patient     Answer:   Immediate     Order Specific Question:   Please select your location:     Answer:   [facility]     Order Specific Question:   COVID Screening Order:     Answer:   In-House: PRE-OP: BD MAX, 8-10 HR TAT [[id number]]     Order Specific Question:   Previously tested for COVID-19?     Answer:   No     Order Specific Question:   Employed in healthcare setting?     Answer:   No     Order Specific Question:   Symptomatic for COVID-19 as defined by CDC?     Answer:   No     Order Specific Question:   Hospitalized for COVID-19?     Answer:   No     Order Specific Question:   Admitted to ICU for COVID-19?     Answer:   No     Order Specific Question:   Resident in a congregate (group) care setting?     Answer:   No     Order Specific Question:   Pregnant?     Answer:   No   • Follow Anesthesia Guidelines / Standing Orders     Standing Status:   Future   • Obtain Informed Consent     Standing Status:   Future     Order Specific Question:   Informed Consent Given For     Answer:   ESOPHAGOGASTRODUODENOSCOPY possible dilation       ESOPHAGOGASTRODUODENOSCOPY possible  dilation (N/A)    Review and/or summary of lab tests, radiology, procedures, medications. Review and summary of old records and obtaining of history. The risks and benefits of my recommendations, as well as other treatment options were discussed with the patient today. Questions were answered.    No orders of the defined types were placed in this encounter.      Follow-up: Return in about 4 weeks (around 12/29/2021) for Recheck, After test.          This document has been electronically signed by SHARYN Dorantes on December 1, 2021 13:24 CST           I spent 16 minutes caring for Belen on this date of service. This time includes time spent by me in the following activities:preparing for the visit, reviewing tests, obtaining and/or reviewing a separately obtained history, performing a medically appropriate examination and/or evaluation , counseling and educating the patient/family/caregiver, ordering medications, tests, or procedures, referring and communicating with other health care professionals , documenting information in the medical record and care coordination    Results for orders placed or performed in visit on 11/22/21   Lipase    Specimen: Blood   Result Value Ref Range    Lipase 31 13 - 60 U/L   Lipid Panel    Specimen: Blood   Result Value Ref Range    Total Cholesterol 182 0 - 200 mg/dL    Triglycerides 117 0 - 150 mg/dL    HDL Cholesterol 54 40 - 60 mg/dL    LDL Cholesterol  107 (H) 0 - 100 mg/dL    VLDL Cholesterol 21 5 - 40 mg/dL    LDL/HDL Ratio 1.94    Results for orders placed or performed in visit on 11/22/21   Amylase    Specimen: Blood   Result Value Ref Range    Amylase 42 28 - 100 U/L   Comprehensive Metabolic Panel    Specimen: Blood   Result Value Ref Range    Glucose 129 (H) 65 - 99 mg/dL    BUN 21 8 - 23 mg/dL    Creatinine 0.88 0.57 - 1.00 mg/dL    Sodium 138 136 - 145 mmol/L    Potassium 5.0 3.5 - 5.2 mmol/L    Chloride 104 98 - 107 mmol/L    CO2 28.0 22.0 - 29.0 mmol/L    Calcium  10.1 8.6 - 10.5 mg/dL    Total Protein 7.7 6.0 - 8.5 g/dL    Albumin 5.00 3.50 - 5.20 g/dL    ALT (SGPT) 17 1 - 33 U/L    AST (SGOT) 19 1 - 32 U/L    Alkaline Phosphatase 59 39 - 117 U/L    Total Bilirubin 0.4 0.0 - 1.2 mg/dL    eGFR Non African Amer 65 >60 mL/min/1.73    Globulin 2.7 gm/dL    A/G Ratio 1.9 g/dL    BUN/Creatinine Ratio 23.9 7.0 - 25.0    Anion Gap 6.0 5.0 - 15.0 mmol/L   Results for orders placed or performed in visit on 09/20/21   Liquid-based Pap Smear, Screening    Specimen: Cervix; ThinPrep Vial   Result Value Ref Range    Case Report       Gynecologic Cytology Report                       Case: [id number]                                  Authorizing Provider:  Cathleen Lucio MD      Collected:           09/20/2021 10:00 AM          Ordering Location:     [facility]   Received:            09/20/2021 02:14 PM                                 [facility]                                                                          [facility]                                                               First Screen:          Ranjeet Fernando                                                              Specimen:    Sendout to [employer], Cervix                                                                     Interpretation See attached report    Results for orders placed or performed in visit on 09/14/21   Urinalysis, Microscopic Only - Urine, Clean Catch    Specimen: Urine, Clean Catch   Result Value Ref Range    RBC, UA 0-2 None Seen, 0-2 /HPF    WBC, UA 3-5 (A) None Seen, 0-2 /HPF    Bacteria, UA 3+ (A) None Seen /HPF    Squamous Epithelial Cells, UA 3-6 (A) None Seen, 0-2 /HPF    Hyaline Casts, UA None Seen None Seen /LPF    Methodology Automated Microscopy    Urinalysis without microscopic (no culture) - Urine, Clean Catch    Specimen: Urine, Clean Catch   Result Value Ref Range    Color, UA Yellow Yellow, Straw    Appearance, UA Clear Clear    pH, UA 6.0 5.0 - 8.0     Specific Gravity, UA 1.023 1.005 - 1.030    Glucose, UA Negative Negative    Ketones, UA Trace (A) Negative    Bilirubin, UA Negative Negative    Blood, UA Negative Negative    Protein, UA Negative Negative    Leuk Esterase, UA Small (1+) (A) Negative    Nitrite, UA Negative Negative    Urobilinogen, UA 0.2 E.U./dL 0.2 - 1.0 E.U./dL   Lipid Panel    Specimen: Blood   Result Value Ref Range    Total Cholesterol 179 0 - 200 mg/dL    Triglycerides 90 0 - 150 mg/dL    HDL Cholesterol 47 40 - 60 mg/dL    LDL Cholesterol  115 (H) 0 - 100 mg/dL    VLDL Cholesterol 17 5 - 40 mg/dL    LDL/HDL Ratio 2.43    Comprehensive Metabolic Panel    Specimen: Blood   Result Value Ref Range    Glucose 101 (H) 65 - 99 mg/dL    BUN 16 8 - 23 mg/dL    Creatinine 0.79 0.57 - 1.00 mg/dL    Sodium 138 136 - 145 mmol/L    Potassium 4.6 3.5 - 5.2 mmol/L    Chloride 103 98 - 107 mmol/L    CO2 23.7 22.0 - 29.0 mmol/L    Calcium 9.6 8.6 - 10.5 mg/dL    Total Protein 7.2 6.0 - 8.5 g/dL    Albumin 4.90 3.50 - 5.20 g/dL    ALT (SGPT) 14 1 - 33 U/L    AST (SGOT) 14 1 - 32 U/L    Alkaline Phosphatase 47 39 - 117 U/L    Total Bilirubin 0.6 0.0 - 1.2 mg/dL    eGFR Non African Amer 74 >60 mL/min/1.73    Globulin 2.3 gm/dL    A/G Ratio 2.1 g/dL    BUN/Creatinine Ratio 20.3 7.0 - 25.0    Anion Gap 11.3 5.0 - 15.0 mmol/L   Results for orders placed or performed in visit on 09/10/20   Urinalysis without microscopic (no culture) - Urine, Clean Catch    Specimen: Urine, Clean Catch   Result Value Ref Range    Color, UA Yellow Yellow, Straw    Appearance, UA Clear Clear    pH, UA 6.5 5.0 - 8.0    Specific Gravity, UA 1.007 1.005 - 1.030    Glucose, UA Negative Negative    Ketones, UA Negative Negative    Bilirubin, UA Negative Negative    Blood, UA Trace (A) Negative    Protein, UA Negative Negative    Leuk Esterase, UA Moderate (2+) (A) Negative    Nitrite, UA Negative Negative    Urobilinogen, UA 0.2 E.U./dL 0.2 - 1.0 E.U./dL   Lipid Panel    Specimen: Blood    Result Value Ref Range    Total Cholesterol 219 (H) 0 - 200 mg/dL    Triglycerides 82 0 - 150 mg/dL    HDL Cholesterol 55 40 - 60 mg/dL    LDL Cholesterol  148 (H) 0 - 100 mg/dL    VLDL Cholesterol 16.4 5 - 40 mg/dL    LDL/HDL Ratio 2.68    Comprehensive Metabolic Panel    Specimen: Blood   Result Value Ref Range    Glucose 104 (H) 65 - 99 mg/dL    BUN 11 6 - 20 mg/dL    Creatinine 0.72 0.57 - 1.00 mg/dL    Sodium 138 136 - 145 mmol/L    Potassium 4.1 3.5 - 5.2 mmol/L    Chloride 101 98 - 107 mmol/L    CO2 23.4 22.0 - 29.0 mmol/L    Calcium 9.9 8.6 - 10.5 mg/dL    Total Protein 7.8 6.0 - 8.5 g/dL    Albumin 5.30 (H) 3.50 - 5.20 g/dL    ALT (SGPT) 18 1 - 33 U/L    AST (SGOT) 19 1 - 32 U/L    Alkaline Phosphatase 61 39 - 117 U/L    Total Bilirubin 0.6 0.0 - 1.2 mg/dL    eGFR Non African Amer 83 >60 mL/min/1.73    Globulin 2.5 gm/dL    A/G Ratio 2.1 g/dL    BUN/Creatinine Ratio 15.3 7.0 - 25.0    Anion Gap 13.6 5.0 - 15.0 mmol/L     *Note: Due to a large number of results and/or encounters for the requested time period, some results have not been displayed. A complete set of results can be found in Results Review.

## 2021-12-01 NOTE — PATIENT INSTRUCTIONS
MyPlate from USDA    MyPlate is an outline of a general healthy diet based on the 2010 Dietary Guidelines for Americans, from the U.S. Department of Agriculture (USDA). It sets guidelines for how much food you should eat from each food group based on your age, sex, and level of physical activity.  What are tips for following MyPlate?  To follow MyPlate recommendations:  · Eat a wide variety of fruits and vegetables, grains, and protein foods.  · Serve smaller portions and eat less food throughout the day.  · Limit portion sizes to avoid overeating.  · Enjoy your food.  · Get at least 150 minutes of exercise every week. This is about 30 minutes each day, 5 or more days per week.  It can be difficult to have every meal look like MyPlate. Think about MyPlate as eating guidelines for an entire day, rather than each individual meal.  Fruits and vegetables  · Make half of your plate fruits and vegetables.  · Eat many different colors of fruits and vegetables each day.  · For a 2,000 calorie daily food plan, eat:  ? 2½ cups of vegetables every day.  ? 2 cups of fruit every day.  · 1 cup is equal to:  ? 1 cup raw or cooked vegetables.  ? 1 cup raw fruit.  ? 1 medium-sized orange, apple, or banana.  ? 1 cup 100% fruit or vegetable juice.  ? 2 cups raw leafy greens, such as lettuce, spinach, or kale.  ? ½ cup dried fruit.  Grains  · One fourth of your plate should be grains.  · Make at least half of the grains you eat each day whole grains.  · For a 2,000 calorie daily food plan, eat 6 oz of grains every day.  · 1 oz is equal to:  ? 1 slice bread.  ? 1 cup cereal.  ? ½ cup cooked rice, cereal, or pasta.  Protein  · One fourth of your plate should be protein.  · Eat a wide variety of protein foods, including meat, poultry, fish, eggs, beans, nuts, and tofu.  · For a 2,000 calorie daily food plan, eat 5½ oz of protein every day.  · 1 oz is equal to:  ? 1 oz meat, poultry, or fish.  ? ¼ cup cooked beans.  ? 1 egg.  ? ½ oz nuts  or seeds.  ? 1 Tbsp peanut butter.  Dairy  · Drink fat-free or low-fat (1%) milk.  · Eat or drink dairy as a side to meals.  · For a 2,000 calorie daily food plan, eat or drink 3 cups of dairy every day.  · 1 cup is equal to:  ? 1 cup milk, yogurt, cottage cheese, or soy milk (soy beverage).  ? 2 oz processed cheese.  ? 1½ oz natural cheese.  Fats, oils, salt, and sugars  · Only small amounts of oils are recommended.  · Avoid foods that are high in calories and low in nutritional value (empty calories), like foods high in fat or added sugars.  · Choose foods that are low in salt (sodium). Choose foods that have less than 140 milligrams (mg) of sodium per serving.  · Drink water instead of sugary drinks. Drink enough water each day to keep your urine pale yellow.  Where to find support  · Work with your health care provider or a nutrition specialist (dietitian) to develop a customized eating plan that is right for you.  · Download an garry (mobile application) to help you track your daily food intake.  Where to find more information  · Go to ChooseMyPlate.gov for more information.  Summary  · MyPlate is a general guideline for healthy eating from the USDA. It is based on the 2010 Dietary Guidelines for Americans.  · In general, fruits and vegetables should take up ½ of your plate, grains should take up ¼ of your plate, and protein should take up ¼ of your plate.  This information is not intended to replace advice given to you by your health care provider. Make sure you discuss any questions you have with your health care provider.  Document Revised: 05/21/2020 Document Reviewed: 03/19/2018  Elsevier Patient Education © 2021 [employer]

## 2021-12-20 ENCOUNTER — LAB (OUTPATIENT)
Dept: LAB | Facility: HOSPITAL | Age: 61
End: 2021-12-20

## 2021-12-20 DIAGNOSIS — R10.13 EPIGASTRIC PAIN: ICD-10-CM

## 2021-12-20 DIAGNOSIS — K21.00 GASTROESOPHAGEAL REFLUX DISEASE WITH ESOPHAGITIS WITHOUT HEMORRHAGE: ICD-10-CM

## 2021-12-20 LAB — SARS-COV-2 N GENE RESP QL NAA+PROBE: NOT DETECTED

## 2021-12-20 PROCEDURE — 87635 SARS-COV-2 COVID-19 AMP PRB: CPT

## 2021-12-20 PROCEDURE — C9803 HOPD COVID-19 SPEC COLLECT: HCPCS

## 2021-12-21 ENCOUNTER — HOSPITAL ENCOUNTER (OUTPATIENT)
Facility: HOSPITAL | Age: 61
Setting detail: HOSPITAL OUTPATIENT SURGERY
Discharge: HOME | End: 2021-12-21
Attending: INTERNAL MEDICINE | Admitting: INTERNAL MEDICINE

## 2021-12-21 ENCOUNTER — ANESTHESIA EVENT (OUTPATIENT)
Dept: GASTROENTEROLOGY | Facility: HOSPITAL | Age: 61
End: 2021-12-21

## 2021-12-21 ENCOUNTER — ANESTHESIA (OUTPATIENT)
Dept: GASTROENTEROLOGY | Facility: HOSPITAL | Age: 61
End: 2021-12-21

## 2021-12-21 VITALS
TEMPERATURE: 97.3 F | OXYGEN SATURATION: 97 % | RESPIRATION RATE: 16 BRPM | BODY MASS INDEX: 25.61 KG/M2 | WEIGHT: 150 LBS | SYSTOLIC BLOOD PRESSURE: 123 MMHG | HEIGHT: 64 IN | DIASTOLIC BLOOD PRESSURE: 73 MMHG | HEART RATE: 75 BPM

## 2021-12-21 DIAGNOSIS — K21.00 GASTROESOPHAGEAL REFLUX DISEASE WITH ESOPHAGITIS WITHOUT HEMORRHAGE: ICD-10-CM

## 2021-12-21 DIAGNOSIS — R10.13 EPIGASTRIC PAIN: ICD-10-CM

## 2021-12-21 PROCEDURE — 43239 EGD BIOPSY SINGLE/MULTIPLE: CPT | Performed by: INTERNAL MEDICINE

## 2021-12-21 PROCEDURE — 25010000002 PROPOFOL 10 MG/ML EMULSION: Performed by: NURSE ANESTHETIST, CERTIFIED REGISTERED

## 2021-12-21 RX ORDER — PROPOFOL 10 MG/ML
VIAL (ML) INTRAVENOUS AS NEEDED
Status: DISCONTINUED | OUTPATIENT
Start: 2021-12-21 | End: 2021-12-21 | Stop reason: SURG

## 2021-12-21 RX ORDER — DEXTROSE AND SODIUM CHLORIDE 5; .45 G/100ML; G/100ML
30 INJECTION, SOLUTION INTRAVENOUS CONTINUOUS PRN
Status: DISCONTINUED | OUTPATIENT
Start: 2021-12-21 | End: 2021-12-21 | Stop reason: HOSPADM

## 2021-12-21 RX ORDER — LIDOCAINE HYDROCHLORIDE 20 MG/ML
INJECTION, SOLUTION INTRAVENOUS AS NEEDED
Status: DISCONTINUED | OUTPATIENT
Start: 2021-12-21 | End: 2021-12-21 | Stop reason: SURG

## 2021-12-21 RX ORDER — ONDANSETRON 2 MG/ML
4 INJECTION INTRAMUSCULAR; INTRAVENOUS ONCE AS NEEDED
Status: DISCONTINUED | OUTPATIENT
Start: 2021-12-21 | End: 2021-12-21 | Stop reason: HOSPADM

## 2021-12-21 RX ADMIN — PROPOFOL 20 MG: 10 INJECTION, EMULSION INTRAVENOUS at 15:37

## 2021-12-21 RX ADMIN — PROPOFOL 20 MG: 10 INJECTION, EMULSION INTRAVENOUS at 15:39

## 2021-12-21 RX ADMIN — PROPOFOL 80 MG: 10 INJECTION, EMULSION INTRAVENOUS at 15:36

## 2021-12-21 RX ADMIN — LIDOCAINE HYDROCHLORIDE 50 MG: 20 INJECTION, SOLUTION INTRAVENOUS at 15:36

## 2021-12-21 RX ADMIN — DEXTROSE AND SODIUM CHLORIDE 30 ML/HR: 5; 450 INJECTION, SOLUTION INTRAVENOUS at 14:49

## 2021-12-21 NOTE — ANESTHESIA PREPROCEDURE EVALUATION
Anesthesia Evaluation     history of anesthetic complications: PONV  NPO Solid Status: > 8 hours  NPO Liquid Status: > 8 hours           Airway   Mallampati: II  TM distance: >3 FB  Neck ROM: full  Possible difficult intubation  Dental - normal exam     Pulmonary - normal exam    breath sounds clear to auscultation  (-) not a smoker    ROS comment: snores  Cardiovascular - normal exam  Exercise tolerance: good (4-7 METS)    ECG reviewed  Rhythm: regular  Rate: normal    (+) hyperlipidemia,   (-) angina    ROS comment: Normal sinus rhythm  Normal ECG  When compared with ECG of 18-FEB-2016 12:42,  premature ventricular complexes are no longer present  Confirmed by JOE    Neuro/Psych  (+) headaches (occ),     GI/Hepatic/Renal/Endo    (+)  GERD,      Musculoskeletal     (+) neck pain,       ROS comment: Right clavicle fracture  Abdominal    Substance History   (+) alcohol use (socially),      OB/GYN negative ob/gyn ROS         Other        (-) history of cancer                    Anesthesia Plan    ASA 2     MAC   total IV anesthesia  intravenous induction     Anesthetic plan, all risks, benefits, and alternatives have been provided, discussed and informed consent has been obtained with: patient.

## 2021-12-21 NOTE — ANESTHESIA POSTPROCEDURE EVALUATION
Patient: Belen Larios    Procedure Summary     Date: 12/21/21 Room / Location: [facility] ENDOSCOPY 1 / [facility] ENDOSCOPY    Anesthesia Start: 1529 Anesthesia Stop: 1544    Procedure: ESOPHAGOGASTRODUODENOSCOPY possible dilation (N/A ) Diagnosis:       Gastroesophageal reflux disease with esophagitis without hemorrhage      Epigastric pain      (Gastroesophageal reflux disease with esophagitis without hemorrhage [K21.00])      (Epigastric pain [R10.13])    Surgeons: Contreras Page MD Provider: Elian Bernard CRNA    Anesthesia Type: MAC ASA Status: 2          Anesthesia Type: MAC    Vitals  No vitals data found for the desired time range.          Post Anesthesia Care and Evaluation    Patient location during evaluation: PACU  Level of consciousness: sleepy but conscious  Pain score: 0  Pain management: adequate  Airway patency: patent  Anesthetic complications: No anesthetic complications  PONV Status: none  Cardiovascular status: acceptable and hemodynamically stable  Respiratory status: acceptable and spontaneous ventilation  Hydration status: acceptable

## 2021-12-27 LAB
LAB AP CASE REPORT: NORMAL
PATH REPORT.FINAL DX SPEC: NORMAL

## 2022-01-10 ENCOUNTER — LAB (OUTPATIENT)
Dept: LAB | Facility: HOSPITAL | Age: 62
End: 2022-01-10

## 2022-01-10 ENCOUNTER — OFFICE VISIT (OUTPATIENT)
Dept: GASTROENTEROLOGY | Facility: CLINIC | Age: 62
End: 2022-01-10

## 2022-01-10 VITALS
HEART RATE: 65 BPM | HEIGHT: 64 IN | SYSTOLIC BLOOD PRESSURE: 162 MMHG | WEIGHT: 155 LBS | BODY MASS INDEX: 26.46 KG/M2 | DIASTOLIC BLOOD PRESSURE: 96 MMHG

## 2022-01-10 DIAGNOSIS — K21.00 GASTROESOPHAGEAL REFLUX DISEASE WITH ESOPHAGITIS WITHOUT HEMORRHAGE: ICD-10-CM

## 2022-01-10 DIAGNOSIS — K21.00 GASTROESOPHAGEAL REFLUX DISEASE WITH ESOPHAGITIS WITHOUT HEMORRHAGE: Primary | ICD-10-CM

## 2022-01-10 PROCEDURE — 86003 ALLG SPEC IGE CRUDE XTRC EA: CPT

## 2022-01-10 PROCEDURE — 86364 TISS TRNSGLTMNASE EA IG CLAS: CPT

## 2022-01-10 PROCEDURE — 99213 OFFICE O/P EST LOW 20 MIN: CPT | Performed by: NURSE PRACTITIONER

## 2022-01-10 PROCEDURE — 36415 COLL VENOUS BLD VENIPUNCTURE: CPT | Performed by: NURSE PRACTITIONER

## 2022-01-10 PROCEDURE — 86677 HELICOBACTER PYLORI ANTIBODY: CPT | Performed by: NURSE PRACTITIONER

## 2022-01-10 PROCEDURE — 86258 DGP ANTIBODY EACH IG CLASS: CPT

## 2022-01-10 NOTE — PROGRESS NOTES
Chief Complaint   Patient presents with   • Heartburn       Subjective    Belen Larios is a 61 y.o. female. she is here today for follow-up.    History of Present Illness  61-year-old female presents for recheck after EGD done due to reflux.  States she still has reflux symptoms and abdominal pain but symptoms have improved since starting Carafate.  She is still taking her omeprazole.  Describes pain as a dull ache with intermittent sharp pain.  She had ultrasound which showed normal gallbladder  EGD noted severe esophagitis gastritis duodenum.  Antrum biopsy noted reactive gastropathy.  Duodenal biopsy noted no significant logic abnormality.  SAF-Gel biopsy noted reactive gastric and squamous mucosa negative for specialized Jensen's mucosa.       The following portions of the patient's history were reviewed and updated as appropriate:   Past Medical History:   Diagnosis Date   • Acute upper respiratory infection    • Allergic rhinitis    • Backache     Backache - possible IT band      • Cough    • Encounter for general adult medical examination without abnormal findings    • Fibrocystic breast    • Health maintenance examination     Adult health examination      • History of screening mammography     Screening mammography      • Hyperlipidemia    • Hyperthyroidism     Hyperthyroidism - hx of same     • Pharyngitis    • Rhinitis    • Sacroiliitis (HCC)     Sacroiliitis, not elsewhere classified      • Skin lesion     Skin lesion - lesion right cheek      • Upper respiratory infection    • Visit for gynecologic examination      Past Surgical History:   Procedure Laterality Date   • CERVICAL SPINE SURGERY  10/28/2010    Cervical spine disk surgery (1)   • CLAVICLE OPEN REDUCTION INTERNAL FIXATION Right 10/19/2018    Procedure: CLAVICLE OPEN REDUCTION INTERNAL FIXATION with iliac crest bone graft right side.     (C-ARM#3);  Surgeon: Phillip Grant MD;  Location: [facility];  Service: Orthopedics   •  COLONOSCOPY  10/27/2011    Colonoscopy, diagnostic (screening) 66507 (1)      • ENDOSCOPY  2008    EGD w/ tube 85274 (1)    Hypopharynx, esophagus,NRL. 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 Multiple cold biop was obtained from the antrum. A single cold biop was obtained & placed on PyloriTek strip. Pylorus, duodenum NRL.    • ENDOSCOPY N/A 2021    Procedure: ESOPHAGOGASTRODUODENOSCOPY possible dilation;  Surgeon: Contreras Page MD;  Location: [facility] ENDOSCOPY;  Service: Gastroenterology;  Laterality: N/A;   • INJECTION OF MEDICATION  2015    Kenalog (4)      • INJECTION OF MEDICATION  2015    Rocephin (2)      • INJECTION OF MEDICATION  2015    Solu-Medrol (1)     • PAP SMEAR  2015    WNL, CARD SENT, DR. LUCIO'S OFFICE   • PROCEDURE GENERIC CONVERTED  2016    Consult, Dermatology (1)        Family History   Problem Relation Age of Onset   • Diabetes Mother    • Lung disease Father    • Esophageal cancer Paternal Uncle    • Cancer Other    • Hypertension Other    • Other Other    • Breast cancer Paternal Aunt      OB History        2    Para   2    Term   2            AB        Living           SAB        IAB        Ectopic        Molar        Multiple        Live Births                  Prior to Admission medications    Medication Sig Start Date End Date Taking? Authorizing Provider   Calcium Carb-Cholecalciferol (CALCIUM CARBONATE-VITAMIN D3) 600-400 MG-UNIT tablet Take 600 mg by mouth daily.   Yes Provider, MD Ronda   lovastatin (MEVACOR) 40 MG tablet Take 1 tablet by mouth Every Night. 21  Yes Cathleen Lucio MD   omeprazole (priLOSEC) 40 MG capsule Take 1 capsule by mouth Daily. 21  Yes Cathleen Lucio MD   sucralfate (Carafate) 1 g tablet Take 1 tablet by mouth 4 (Four) Times a Day. 21  Yes Cathleen Lucio MD   traZODone (DESYREL) 50 MG tablet Take 1 tablet by mouth every night. 21  Cathleen Lucio MD     Allergies  "  Allergen Reactions   • Sulfa Antibiotics Rash     Social History     Socioeconomic History   • Marital status:    Tobacco Use   • Smoking status: Never Smoker   • Smokeless tobacco: Never Used   Vaping Use   • Vaping Use: Never used   Substance and Sexual Activity   • Alcohol use: Yes     Comment: occasionally   • Drug use: No   • Sexual activity: Defer       Review of Systems  Review of Systems   Constitutional: Positive for fatigue. Negative for activity change, appetite change, chills, diaphoresis, fever and unexpected weight change.   HENT: Negative for sore throat and trouble swallowing.    Respiratory: Negative for shortness of breath.    Gastrointestinal: Positive for abdominal pain (occ ). Negative for abdominal distention, anal bleeding, blood in stool, constipation, diarrhea, nausea, rectal pain and vomiting.   Musculoskeletal: Negative for arthralgias.   Skin: Negative for pallor.   Neurological: Negative for light-headedness.        /96 (BP Location: Left arm)   Pulse 65   Ht 162.6 cm (64\")   Wt 70.3 kg (155 lb)   LMP  (LMP Unknown)   BMI 26.61 kg/m²     Objective    Physical Exam  Constitutional:       General: She is not in acute distress.     Appearance: Normal appearance. She is normal weight. She is not ill-appearing.   HENT:      Head: Normocephalic and atraumatic.   Pulmonary:      Effort: Pulmonary effort is normal.   Abdominal:      General: Bowel sounds are normal. There is no distension.      Palpations: Abdomen is soft. There is no mass.      Tenderness: There is abdominal tenderness (mild ).   Neurological:      Mental Status: She is alert.       Admission on 12/21/2021, Discharged on 12/21/2021   Component Date Value Ref Range Status   • Case Report 12/21/2021    Final                    Value:Surgical Pathology Report                         Case: [id number]                                  Authorizing Provider:  Contreras Page MD        Collected:           " 12/21/2021 03:42 PM          Ordering Location:     [facility]   Received:            12/22/2021 07:01 AM                                 [facility]                                                     Pathologist:           Mohit Pike MD                                                           Specimens:   1) - Small Intestine, Duodenum                                                                      2) - Gastric, Antrum                                                                                3) - Esophagus, Distal                                                                    • Final Diagnosis 12/21/2021    Final                    Value:This result contains rich text formatting which cannot be displayed here.     Assessment/Plan      1. Gastroesophageal reflux disease with esophagitis without hemorrhage    .   Continue PPI daily and Carafate for full 3-month course.  We will obtain lab work to rule out food allergy or H. pylori.  Persist or worsen we will plan HIDA scan.  Follow-up in 8 weeks return office sooner if needed we will contact patient with lab results when available    Orders placed during this encounter include:  Orders Placed This Encounter   Procedures   • Recurrent Gastrointestinal Distress     Standing Status:   Future     Standing Expiration Date:   1/10/2023     Order Specific Question:   Release to patient     Answer:   Immediate   • Helicobacter Pylori, IgA IgG IgM     Order Specific Question:   Release to patient     Answer:   Immediate       * Surgery not found *    Review and/or summary of lab tests, radiology, procedures, medications. Review and summary of old records and obtaining of history. The risks and benefits of my recommendations, as well as other treatment options were discussed with the patient today. Questions were answered.    No orders of the defined types were placed in this encounter.      Follow-up: Return in about 8 weeks  (around 3/7/2022).          This document has been electronically signed by SHARYN Dorantes on January 10, 2022 10:19 CST           I spent 18 minutes caring for Belen on this date of service. This time includes time spent by me in the following activities:preparing for the visit, reviewing tests, obtaining and/or reviewing a separately obtained history, performing a medically appropriate examination and/or evaluation , counseling and educating the patient/family/caregiver, ordering medications, tests, or procedures, referring and communicating with other health care professionals , documenting information in the medical record and care coordination    Results for orders placed or performed during the hospital encounter of 12/21/21   Tissue Pathology Exam    Specimen: A: Small Intestine, Duodenum; Tissue    B: Gastric, Antrum; Tissue    C: Esophagus, Distal; Tissue   Result Value Ref Range    Case Report       Surgical Pathology Report                         Case: [id number]                                  Authorizing Provider:  Contreras Page MD        Collected:           12/21/2021 03:42 PM          Ordering Location:     [facility]   Received:            12/22/2021 07:01 AM                                 [place] [facility]                                                     Pathologist:           Mohit Pike MD                                                           Specimens:   1) - Small Intestine, Duodenum                                                                      2) - Gastric, Antrum                                                                                3) - Esophagus, Distal                                                                     Final Diagnosis       SEE SCANNED REPORT       Results for orders placed or performed in visit on 12/20/21   COVID-JOANN Tam IN-HOUSE, NP SWAB IN TRANSPORT MEDIA 8-10 HR TAT - Swab, Nasopharynx    Specimen:  Nasopharynx; Swab   Result Value Ref Range    COVID19 Not Detected Not Detected - Ref. Range   Results for orders placed or performed in visit on 11/22/21   Lipase    Specimen: Blood   Result Value Ref Range    Lipase 31 13 - 60 U/L   Lipid Panel    Specimen: Blood   Result Value Ref Range    Total Cholesterol 182 0 - 200 mg/dL    Triglycerides 117 0 - 150 mg/dL    HDL Cholesterol 54 40 - 60 mg/dL    LDL Cholesterol  107 (H) 0 - 100 mg/dL    VLDL Cholesterol 21 5 - 40 mg/dL    LDL/HDL Ratio 1.94    Results for orders placed or performed in visit on 11/22/21   Amylase    Specimen: Blood   Result Value Ref Range    Amylase 42 28 - 100 U/L   Comprehensive Metabolic Panel    Specimen: Blood   Result Value Ref Range    Glucose 129 (H) 65 - 99 mg/dL    BUN 21 8 - 23 mg/dL    Creatinine 0.88 0.57 - 1.00 mg/dL    Sodium 138 136 - 145 mmol/L    Potassium 5.0 3.5 - 5.2 mmol/L    Chloride 104 98 - 107 mmol/L    CO2 28.0 22.0 - 29.0 mmol/L    Calcium 10.1 8.6 - 10.5 mg/dL    Total Protein 7.7 6.0 - 8.5 g/dL    Albumin 5.00 3.50 - 5.20 g/dL    ALT (SGPT) 17 1 - 33 U/L    AST (SGOT) 19 1 - 32 U/L    Alkaline Phosphatase 59 39 - 117 U/L    Total Bilirubin 0.4 0.0 - 1.2 mg/dL    eGFR Non African Amer 65 >60 mL/min/1.73    Globulin 2.7 gm/dL    A/G Ratio 1.9 g/dL    BUN/Creatinine Ratio 23.9 7.0 - 25.0    Anion Gap 6.0 5.0 - 15.0 mmol/L   Results for orders placed or performed in visit on 09/20/21   Liquid-based Pap Smear, Screening    Specimen: Cervix; ThinPrep Vial   Result Value Ref Range    Case Report       Gynecologic Cytology Report                       Case: [id number]                                  Authorizing Provider:  Cathleen Lucio MD      Collected:           09/20/2021 10:00 AM          [facility]   Received:            09/20/2021 02:14 PM                                 [facility]                                                                          -  [place]                                                               First Screen:          Ranjeet Fernando                                                              Specimen:    Sendout to [employer], Cervix                                                                     Interpretation See attached report    Results for orders placed or performed in visit on 09/14/21   Urinalysis, Microscopic Only - Urine, Clean Catch    Specimen: Urine, Clean Catch   Result Value Ref Range    RBC, UA 0-2 None Seen, 0-2 /HPF    WBC, UA 3-5 (A) None Seen, 0-2 /HPF    Bacteria, UA 3+ (A) None Seen /HPF    Squamous Epithelial Cells, UA 3-6 (A) None Seen, 0-2 /HPF    Hyaline Casts, UA None Seen None Seen /LPF    Methodology Automated Microscopy    Urinalysis without microscopic (no culture) - Urine, Clean Catch    Specimen: Urine, Clean Catch   Result Value Ref Range    Color, UA Yellow Yellow, Straw    Appearance, UA Clear Clear    pH, UA 6.0 5.0 - 8.0    Specific Gravity, UA 1.023 1.005 - 1.030    Glucose, UA Negative Negative    Ketones, UA Trace (A) Negative    Bilirubin, UA Negative Negative    Blood, UA Negative Negative    Protein, UA Negative Negative    Leuk Esterase, UA Small (1+) (A) Negative    Nitrite, UA Negative Negative    Urobilinogen, UA 0.2 E.U./dL 0.2 - 1.0 E.U./dL   Lipid Panel    Specimen: Blood   Result Value Ref Range    Total Cholesterol 179 0 - 200 mg/dL    Triglycerides 90 0 - 150 mg/dL    HDL Cholesterol 47 40 - 60 mg/dL    LDL Cholesterol  115 (H) 0 - 100 mg/dL    VLDL Cholesterol 17 5 - 40 mg/dL    LDL/HDL Ratio 2.43    Comprehensive Metabolic Panel    Specimen: Blood   Result Value Ref Range    Glucose 101 (H) 65 - 99 mg/dL    BUN 16 8 - 23 mg/dL    Creatinine 0.79 0.57 - 1.00 mg/dL    Sodium 138 136 - 145 mmol/L    Potassium 4.6 3.5 - 5.2 mmol/L    Chloride 103 98 - 107 mmol/L    CO2 23.7 22.0 - 29.0 mmol/L    Calcium 9.6 8.6 - 10.5 mg/dL    Total Protein 7.2 6.0 - 8.5 g/dL    Albumin 4.90 3.50 -  5.20 g/dL    ALT (SGPT) 14 1 - 33 U/L    AST (SGOT) 14 1 - 32 U/L    Alkaline Phosphatase 47 39 - 117 U/L    Total Bilirubin 0.6 0.0 - 1.2 mg/dL    eGFR Non African Amer 74 >60 mL/min/1.73    Globulin 2.3 gm/dL    A/G Ratio 2.1 g/dL    BUN/Creatinine Ratio 20.3 7.0 - 25.0    Anion Gap 11.3 5.0 - 15.0 mmol/L   Results for orders placed or performed in visit on 09/10/20   Urinalysis without microscopic (no culture) - Urine, Clean Catch    Specimen: Urine, Clean Catch   Result Value Ref Range    Color, UA Yellow Yellow, Straw    Appearance, UA Clear Clear    pH, UA 6.5 5.0 - 8.0    Specific Gravity, UA 1.007 1.005 - 1.030    Glucose, UA Negative Negative    Ketones, UA Negative Negative    Bilirubin, UA Negative Negative    Blood, UA Trace (A) Negative    Protein, UA Negative Negative    Leuk Esterase, UA Moderate (2+) (A) Negative    Nitrite, UA Negative Negative    Urobilinogen, UA 0.2 E.U./dL 0.2 - 1.0 E.U./dL   Lipid Panel    Specimen: Blood   Result Value Ref Range    Total Cholesterol 219 (H) 0 - 200 mg/dL    Triglycerides 82 0 - 150 mg/dL    HDL Cholesterol 55 40 - 60 mg/dL    LDL Cholesterol  148 (H) 0 - 100 mg/dL    VLDL Cholesterol 16.4 5 - 40 mg/dL    LDL/HDL Ratio 2.68    Comprehensive Metabolic Panel    Specimen: Blood   Result Value Ref Range    Glucose 104 (H) 65 - 99 mg/dL    BUN 11 6 - 20 mg/dL    Creatinine 0.72 0.57 - 1.00 mg/dL    Sodium 138 136 - 145 mmol/L    Potassium 4.1 3.5 - 5.2 mmol/L    Chloride 101 98 - 107 mmol/L    CO2 23.4 22.0 - 29.0 mmol/L    Calcium 9.9 8.6 - 10.5 mg/dL    Total Protein 7.8 6.0 - 8.5 g/dL    Albumin 5.30 (H) 3.50 - 5.20 g/dL    ALT (SGPT) 18 1 - 33 U/L    AST (SGOT) 19 1 - 32 U/L    Alkaline Phosphatase 61 39 - 117 U/L    Total Bilirubin 0.6 0.0 - 1.2 mg/dL    eGFR Non African Amer 83 >60 mL/min/1.73    Globulin 2.5 gm/dL    A/G Ratio 2.1 g/dL    BUN/Creatinine Ratio 15.3 7.0 - 25.0    Anion Gap 13.6 5.0 - 15.0 mmol/L     *Note: Due to a large number of results and/or  encounters for the requested time period, some results have not been displayed. A complete set of results can be found in Results Review.

## 2022-01-11 ENCOUNTER — OFFICE VISIT (OUTPATIENT)
Dept: OTOLARYNGOLOGY | Facility: CLINIC | Age: 62
End: 2022-01-11

## 2022-01-11 VITALS — HEIGHT: 64 IN | WEIGHT: 153.8 LBS | OXYGEN SATURATION: 99 % | BODY MASS INDEX: 26.26 KG/M2

## 2022-01-11 DIAGNOSIS — H60.62 CHRONIC NON-INFECTIVE OTITIS EXTERNA OF LEFT EAR, UNSPECIFIED TYPE: Primary | ICD-10-CM

## 2022-01-11 LAB
GLIADIN PEPTIDE IGA SER-ACNC: 4 UNITS (ref 0–19)
GLIADIN PEPTIDE IGG SER-ACNC: 2 UNITS (ref 0–19)
H PYLORI IGA SER-ACNC: <9 UNITS (ref 0–8.9)
H PYLORI IGG SER IA-ACNC: 0.38 INDEX VALUE (ref 0–0.79)
H PYLORI IGM SER-ACNC: <9 UNITS (ref 0–8.9)
TTG IGA SER-ACNC: <2 U/ML (ref 0–3)
TTG IGG SER-ACNC: <2 U/ML (ref 0–5)

## 2022-01-11 PROCEDURE — 99204 OFFICE O/P NEW MOD 45 MIN: CPT | Performed by: OTOLARYNGOLOGY

## 2022-01-11 RX ORDER — CLOTRIMAZOLE AND BETAMETHASONE DIPROPIONATE 10; .64 MG/G; MG/G
CREAM TOPICAL
Qty: 45 G | Refills: 2 | OUTPATIENT
Start: 2022-01-11 | End: 2023-02-21

## 2022-01-11 RX ORDER — CLOTRIMAZOLE AND BETAMETHASONE DIPROPIONATE 10; .5 MG/ML; MG/ML
LOTION TOPICAL
Qty: 30 ML | Refills: 2 | Status: SHIPPED | OUTPATIENT
Start: 2022-01-11 | End: 2022-01-11

## 2022-01-12 LAB
CODFISH IGE QN: <0.1 KU/L
CONV CLASS DESCRIPTION: NORMAL
COW MILK IGE QN: <0.1 KU/L
EGG WHITE IGE QN: <0.1 KU/L
GLUTEN IGE QN: <0.1 KU/L
HAZELNUT IGE QN: <0.1 KU/L
PEANUT IGE QN: <0.1 KU/L
SCALLOP IGE QN: <0.1 KU/L
SESAME SEED IGE QN: <0.1 KU/L
SHRIMP IGE QN: <0.1 KU/L
SOYBEAN IGE QN: <0.1 KU/L
WALNUT IGE QN: <0.1 KU/L
WHEAT IGE QN: <0.1 KU/L

## 2022-01-14 NOTE — PROGRESS NOTES
Subjective   Beeln Larios is a 61 y.o. female.       History of Present Illness     Patient states her left ear has been itching and crusting for the last 4 to 5 months.  She uses a Q-tip.  No previous otologic surgery.    The following portions of the patient's history were reviewed and updated as appropriate: allergies, current medications, past family history, past medical history, past social history, past surgical history and problem list.     reports that she has never smoked. She has never used smokeless tobacco. She reports current alcohol use. She reports that she does not use drugs.   Patient is not a tobacco user and has not been counseled for use of tobacco products      Review of Systems        Objective   Physical Exam  Left ear canal shows erythema scaling and squamous debris of the external os and the lateral ear canal that is cleaned under the microscope using instrumentation.  Tympanic membrane is intact no infection or effusion.  Right ear no discharge.  Tympanic membrane intact clear and mobile.      Assessment/Plan   Diagnoses and all orders for this visit:    1. Chronic non-infective otitis externa of left ear, unspecified type (Primary)    Other orders  -     Discontinue: clotrimazole-betamethasone (LOTRISONE) 1-0.05 % lotion; Apply to left ear twice a day for 7 to 10 days as needed for itching.  Dispense: 30 mL; Refill: 2        Plan: Ear cleaned as described above.  Prescribe Lotrisone to be used twice a day for 7 to 10 days then as needed.  Avoid Q-tip manipulate.  May follow-up with me as needed.  Originally prescribed Lotrisone lotion but for formulary reasons this was changed to Lotrisone cream.

## 2022-02-18 DIAGNOSIS — K21.9 GERD WITHOUT ESOPHAGITIS: ICD-10-CM

## 2022-02-18 DIAGNOSIS — R10.13 EPIGASTRIC PAIN: ICD-10-CM

## 2022-02-18 RX ORDER — SUCRALFATE 1 G/1
1 TABLET ORAL 4 TIMES DAILY
Qty: 120 TABLET | Refills: 2 | Status: SHIPPED | OUTPATIENT
Start: 2022-02-18 | End: 2022-03-16

## 2022-03-15 DIAGNOSIS — K21.9 GERD WITHOUT ESOPHAGITIS: ICD-10-CM

## 2022-03-15 RX ORDER — OMEPRAZOLE 40 MG/1
40 CAPSULE, DELAYED RELEASE ORAL DAILY
Qty: 90 CAPSULE | Refills: 1 | Status: SHIPPED | OUTPATIENT
Start: 2022-03-15 | End: 2022-09-13 | Stop reason: SDUPTHER

## 2022-03-29 DIAGNOSIS — Z12.31 ENCOUNTER FOR SCREENING MAMMOGRAM FOR MALIGNANT NEOPLASM OF BREAST: Primary | ICD-10-CM

## 2022-03-29 DIAGNOSIS — Z13.820 ENCOUNTER FOR SCREENING FOR OSTEOPOROSIS: ICD-10-CM

## 2022-07-13 PROCEDURE — 87086 URINE CULTURE/COLONY COUNT: CPT | Performed by: FAMILY MEDICINE

## 2022-07-21 ENCOUNTER — OFFICE VISIT (OUTPATIENT)
Dept: FAMILY MEDICINE CLINIC | Facility: CLINIC | Age: 62
End: 2022-07-21

## 2022-07-21 VITALS
WEIGHT: 151 LBS | HEIGHT: 64 IN | HEART RATE: 75 BPM | BODY MASS INDEX: 25.78 KG/M2 | OXYGEN SATURATION: 100 % | DIASTOLIC BLOOD PRESSURE: 90 MMHG | SYSTOLIC BLOOD PRESSURE: 158 MMHG

## 2022-07-21 DIAGNOSIS — R35.0 URINARY FREQUENCY: Primary | ICD-10-CM

## 2022-07-21 LAB
BILIRUB BLD-MCNC: NEGATIVE MG/DL
CLARITY, POC: CLEAR
COLOR UR: YELLOW
GLUCOSE UR STRIP-MCNC: NEGATIVE MG/DL
KETONES UR QL: NEGATIVE
LEUKOCYTE EST, POC: NEGATIVE
NITRITE UR-MCNC: NEGATIVE MG/ML
PH UR: 6 [PH] (ref 5–8)
PROT UR STRIP-MCNC: NEGATIVE MG/DL
RBC # UR STRIP: NEGATIVE /UL
SP GR UR: 1.01 (ref 1–1.03)
UROBILINOGEN UR QL: NORMAL

## 2022-07-21 PROCEDURE — 81002 URINALYSIS NONAUTO W/O SCOPE: CPT | Performed by: GENERAL PRACTICE

## 2022-07-21 PROCEDURE — 99213 OFFICE O/P EST LOW 20 MIN: CPT | Performed by: GENERAL PRACTICE

## 2022-07-21 NOTE — PROGRESS NOTES
"Subjective   Belen Larios is a 61 y.o. female.   Chief Complaint   Patient presents with   • Urinary Tract Infection     F/u, has some improvement, no longer having pain but still having fullness feeling after urinating       Urinary Tract Infection   This is a new problem. The problem has been resolved. The quality of the pain is described as burning. There has been no fever. Associated symptoms include frequency and urgency. She has tried antibiotics and increased fluids for the symptoms. The treatment provided significant relief.   Still has a full feeling in the pelvis.     The following portions of the patient's history were reviewed and updated as appropriate: allergies, current medications, past social history and problem list.    Outpatient Medications Prior to Visit   Medication Sig Dispense Refill   • Calcium Carb-Cholecalciferol (CALCIUM CARBONATE-VITAMIN D3) 600-400 MG-UNIT tablet Take 600 mg by mouth daily.     • cetirizine (zyrTEC) 10 MG tablet Take 1 tablet by mouth daily. 30 tablet 0   • lovastatin (MEVACOR) 40 MG tablet Take 1 tablet by mouth Every Night. 90 tablet 3   • omeprazole (priLOSEC) 40 MG capsule Take 1 capsule by mouth Daily. 90 capsule 1     No facility-administered medications prior to visit.       Review of Systems   Genitourinary: Positive for frequency and urgency.     I have reviewed 12 systems with patient. Findings were negative except what is noted below and/or in history of present illness.     Objective   Visit Vitals  /90 (BP Location: Left arm, Patient Position: Sitting)   Pulse 75   Ht 162.6 cm (64\")   Wt 68.5 kg (151 lb)   LMP  (LMP Unknown)   SpO2 100%   BMI 25.92 kg/m²     Physical Exam  Vitals and nursing note reviewed.   Constitutional:       General: She is not in acute distress.     Appearance: She is well-developed.   HENT:      Head: Normocephalic and atraumatic.      Nose: Nose normal.   Eyes:      General:         Right eye: No discharge.         Left " eye: No discharge.      Conjunctiva/sclera: Conjunctivae normal.      Pupils: Pupils are equal, round, and reactive to light.   Neck:      Thyroid: No thyromegaly.   Cardiovascular:      Rate and Rhythm: Normal rate and regular rhythm.      Heart sounds: Normal heart sounds.   Pulmonary:      Effort: Pulmonary effort is normal.      Breath sounds: Normal breath sounds.   Genitourinary:     Vagina: Normal.      Uterus: Normal.       Adnexa: Right adnexa normal and left adnexa normal.   Lymphadenopathy:      Cervical: No cervical adenopathy.   Skin:     General: Skin is warm and dry.   Neurological:      Mental Status: She is alert and oriented to person, place, and time.         Notes brought forward are reviewed and updated if indicated.     Assessment & Plan   Problems Addressed this Visit    None     Visit Diagnoses     Urinary frequency    -  Primary    Relevant Orders    POC Urinalysis Dipstick (Completed)      Diagnoses       Codes Comments    Urinary frequency    -  Primary ICD-10-CM: R35.0  ICD-9-CM: 788.41           Reassured.  Continue to push fluids.  Recheck if not improving.    No orders of the defined types were placed in this encounter.    No follow-ups on file.        This document has been electronically signed by Cathleen Lucio MD on July 21, 2022 10:23 CDT

## 2022-09-13 DIAGNOSIS — K21.9 GERD WITHOUT ESOPHAGITIS: ICD-10-CM

## 2022-09-13 RX ORDER — OMEPRAZOLE 40 MG/1
40 CAPSULE, DELAYED RELEASE ORAL DAILY
Qty: 90 CAPSULE | Refills: 1 | Status: SHIPPED | OUTPATIENT
Start: 2022-09-13 | End: 2023-03-13 | Stop reason: SDUPTHER

## 2022-09-13 RX ORDER — LOVASTATIN 40 MG/1
40 TABLET ORAL NIGHTLY
Qty: 90 TABLET | Refills: 3 | Status: SHIPPED | OUTPATIENT
Start: 2022-09-13

## 2022-09-21 DIAGNOSIS — E78.00 HYPERCHOLESTEROLEMIA: Primary | ICD-10-CM

## 2022-09-21 DIAGNOSIS — R20.0 NUMBNESS AND TINGLING: ICD-10-CM

## 2022-09-21 DIAGNOSIS — K21.00 GASTROESOPHAGEAL REFLUX DISEASE WITH ESOPHAGITIS WITHOUT HEMORRHAGE: ICD-10-CM

## 2022-09-21 DIAGNOSIS — R20.2 NUMBNESS AND TINGLING: ICD-10-CM

## 2022-09-22 ENCOUNTER — LAB (OUTPATIENT)
Dept: LAB | Facility: HOSPITAL | Age: 62
End: 2022-09-22

## 2022-09-22 LAB
25(OH)D3 SERPL-MCNC: 54.9 NG/ML (ref 30–100)
ALBUMIN SERPL-MCNC: 4.8 G/DL (ref 3.5–5.2)
ALBUMIN/GLOB SERPL: 2.2 G/DL
ALP SERPL-CCNC: 51 U/L (ref 39–117)
ALT SERPL W P-5'-P-CCNC: 19 U/L (ref 1–33)
ANION GAP SERPL CALCULATED.3IONS-SCNC: 15 MMOL/L (ref 5–15)
AST SERPL-CCNC: 23 U/L (ref 1–32)
BASOPHILS # BLD AUTO: 0.04 10*3/MM3 (ref 0–0.2)
BASOPHILS NFR BLD AUTO: 1 % (ref 0–1.5)
BILIRUB SERPL-MCNC: 0.5 MG/DL (ref 0–1.2)
BUN SERPL-MCNC: 14 MG/DL (ref 8–23)
BUN/CREAT SERPL: 17.1 (ref 7–25)
CALCIUM SPEC-SCNC: 9.9 MG/DL (ref 8.6–10.5)
CHLORIDE SERPL-SCNC: 104 MMOL/L (ref 98–107)
CHOLEST SERPL-MCNC: 178 MG/DL (ref 0–200)
CO2 SERPL-SCNC: 22 MMOL/L (ref 22–29)
CREAT SERPL-MCNC: 0.82 MG/DL (ref 0.57–1)
DEPRECATED RDW RBC AUTO: 39.3 FL (ref 37–54)
EGFRCR SERPLBLD CKD-EPI 2021: 81 ML/MIN/1.73
EOSINOPHIL # BLD AUTO: 0.08 10*3/MM3 (ref 0–0.4)
EOSINOPHIL NFR BLD AUTO: 2 % (ref 0.3–6.2)
ERYTHROCYTE [DISTWIDTH] IN BLOOD BY AUTOMATED COUNT: 12.5 % (ref 12.3–15.4)
GLOBULIN UR ELPH-MCNC: 2.2 GM/DL
GLUCOSE SERPL-MCNC: 103 MG/DL (ref 65–99)
HBA1C MFR BLD: 5.6 % (ref 4.8–5.6)
HCT VFR BLD AUTO: 43.1 % (ref 34–46.6)
HDLC SERPL-MCNC: 47 MG/DL (ref 40–60)
HGB BLD-MCNC: 15 G/DL (ref 12–15.9)
IMM GRANULOCYTES # BLD AUTO: 0.01 10*3/MM3 (ref 0–0.05)
IMM GRANULOCYTES NFR BLD AUTO: 0.3 % (ref 0–0.5)
IRON 24H UR-MRATE: 78 MCG/DL (ref 37–145)
LDLC SERPL CALC-MCNC: 117 MG/DL (ref 0–100)
LDLC/HDLC SERPL: 2.47 {RATIO}
LYMPHOCYTES # BLD AUTO: 1.23 10*3/MM3 (ref 0.7–3.1)
LYMPHOCYTES NFR BLD AUTO: 30.9 % (ref 19.6–45.3)
MAGNESIUM SERPL-MCNC: 2.3 MG/DL (ref 1.6–2.4)
MCH RBC QN AUTO: 30.1 PG (ref 26.6–33)
MCHC RBC AUTO-ENTMCNC: 34.8 G/DL (ref 31.5–35.7)
MCV RBC AUTO: 86.5 FL (ref 79–97)
MONOCYTES # BLD AUTO: 0.3 10*3/MM3 (ref 0.1–0.9)
MONOCYTES NFR BLD AUTO: 7.5 % (ref 5–12)
NEUTROPHILS NFR BLD AUTO: 2.32 10*3/MM3 (ref 1.7–7)
NEUTROPHILS NFR BLD AUTO: 58.3 % (ref 42.7–76)
NRBC BLD AUTO-RTO: 0 /100 WBC (ref 0–0.2)
PLATELET # BLD AUTO: 277 10*3/MM3 (ref 140–450)
PMV BLD AUTO: 10.2 FL (ref 6–12)
POTASSIUM SERPL-SCNC: 4.6 MMOL/L (ref 3.5–5.2)
PROT SERPL-MCNC: 7 G/DL (ref 6–8.5)
RBC # BLD AUTO: 4.98 10*6/MM3 (ref 3.77–5.28)
SODIUM SERPL-SCNC: 141 MMOL/L (ref 136–145)
TRIGL SERPL-MCNC: 75 MG/DL (ref 0–150)
TSH SERPL DL<=0.05 MIU/L-ACNC: 2.6 UIU/ML (ref 0.27–4.2)
VIT B12 BLD-MCNC: 632 PG/ML (ref 211–946)
VLDLC SERPL-MCNC: 14 MG/DL (ref 5–40)
WBC NRBC COR # BLD: 3.98 10*3/MM3 (ref 3.4–10.8)

## 2022-09-22 PROCEDURE — 82306 VITAMIN D 25 HYDROXY: CPT | Performed by: NURSE PRACTITIONER

## 2022-09-22 PROCEDURE — 80050 GENERAL HEALTH PANEL: CPT | Performed by: NURSE PRACTITIONER

## 2022-09-22 PROCEDURE — 83036 HEMOGLOBIN GLYCOSYLATED A1C: CPT | Performed by: NURSE PRACTITIONER

## 2022-09-22 PROCEDURE — 80061 LIPID PANEL: CPT | Performed by: NURSE PRACTITIONER

## 2022-09-22 PROCEDURE — 82607 VITAMIN B-12: CPT | Performed by: NURSE PRACTITIONER

## 2022-09-22 PROCEDURE — 83540 ASSAY OF IRON: CPT | Performed by: NURSE PRACTITIONER

## 2022-09-22 PROCEDURE — 83735 ASSAY OF MAGNESIUM: CPT | Performed by: NURSE PRACTITIONER

## 2022-09-22 PROCEDURE — 36415 COLL VENOUS BLD VENIPUNCTURE: CPT | Performed by: NURSE PRACTITIONER

## 2022-09-27 ENCOUNTER — OFFICE VISIT (OUTPATIENT)
Dept: FAMILY MEDICINE CLINIC | Facility: CLINIC | Age: 62
End: 2022-09-27

## 2022-09-27 VITALS
HEART RATE: 68 BPM | BODY MASS INDEX: 25.47 KG/M2 | SYSTOLIC BLOOD PRESSURE: 128 MMHG | OXYGEN SATURATION: 99 % | DIASTOLIC BLOOD PRESSURE: 78 MMHG | HEIGHT: 64 IN | RESPIRATION RATE: 18 BRPM | WEIGHT: 149.2 LBS

## 2022-09-27 DIAGNOSIS — R73.9 HYPERGLYCEMIA: ICD-10-CM

## 2022-09-27 DIAGNOSIS — B37.9 YEAST INFECTION: ICD-10-CM

## 2022-09-27 DIAGNOSIS — Z00.00 ANNUAL PHYSICAL EXAM: Primary | ICD-10-CM

## 2022-09-27 DIAGNOSIS — J32.9 SINUSITIS, UNSPECIFIED CHRONICITY, UNSPECIFIED LOCATION: ICD-10-CM

## 2022-09-27 DIAGNOSIS — E78.00 HYPERCHOLESTEROLEMIA: ICD-10-CM

## 2022-09-27 PROCEDURE — 99396 PREV VISIT EST AGE 40-64: CPT | Performed by: GENERAL PRACTICE

## 2022-09-27 RX ORDER — CEFPROZIL 500 MG/1
500 TABLET, FILM COATED ORAL 2 TIMES DAILY
Qty: 14 TABLET | Refills: 0 | OUTPATIENT
Start: 2022-09-27 | End: 2022-11-10

## 2022-09-27 RX ORDER — FLUCONAZOLE 150 MG/1
150 TABLET ORAL ONCE
Qty: 2 TABLET | Refills: 0 | Status: SHIPPED | OUTPATIENT
Start: 2022-09-27 | End: 2022-12-07 | Stop reason: SDUPTHER

## 2022-09-27 NOTE — PROGRESS NOTES
"Subjective   Belen Larios is a 62 y.o. female.     Chief Complaint   Patient presents with   • Annual Exam     M & D today        History of Present Illness     For annual wellness exam. Records reviewed. Recent labs, xrays reviewed and medications reconciled. Due for mammogram and bone density. Has a sweet taste in mouth, wondering if it is from sinus infection. Would like to see a dietician to discuss healthy diet.     The following portions of the patient's history were reviewed and updated as appropriate: allergies, current medications, past family and social history and problem list.    Outpatient Medications Prior to Visit   Medication Sig Dispense Refill   • Calcium Carb-Cholecalciferol (CALCIUM CARBONATE-VITAMIN D3) 600-400 MG-UNIT tablet Take 600 mg by mouth daily.     • cetirizine (zyrTEC) 10 MG tablet Take 1 tablet by mouth daily. 30 tablet 0   • lovastatin (MEVACOR) 40 MG tablet Take 1 tablet by mouth Every Night. 90 tablet 3   • omeprazole (priLOSEC) 40 MG capsule Take 1 capsule by mouth Daily. 90 capsule 1     No facility-administered medications prior to visit.       Review of Systems  I have reviewed 12 systems with patient. Findings were negative except what is noted below and/or in history of present illness.    Objective     Visit Vitals  /78   Pulse 68   Resp 18   Ht 162.6 cm (64\")   Wt 67.7 kg (149 lb 3.2 oz)   LMP  (LMP Unknown)   SpO2 99%   BMI 25.61 kg/m²     Physical Exam  Vitals and nursing note reviewed.   Constitutional:       General: She is not in acute distress.     Appearance: She is well-developed.   HENT:      Head: Normocephalic and atraumatic.      Nose: Nose normal.   Eyes:      General:         Right eye: No discharge.         Left eye: No discharge.      Conjunctiva/sclera: Conjunctivae normal.      Pupils: Pupils are equal, round, and reactive to light.   Neck:      Thyroid: No thyromegaly.      Trachea: No tracheal deviation.   Cardiovascular:      Rate and " Rhythm: Normal rate and regular rhythm.      Heart sounds: Normal heart sounds. No murmur heard.  Pulmonary:      Effort: Pulmonary effort is normal. No respiratory distress.      Breath sounds: Normal breath sounds. No wheezing or rales.   Chest:      Chest wall: No tenderness.   Breasts:      Right: No inverted nipple, mass, nipple discharge, skin change or tenderness.      Left: No inverted nipple, mass, nipple discharge, skin change or tenderness.       Abdominal:      General: Bowel sounds are normal. There is no distension.      Palpations: Abdomen is soft. There is no mass.      Tenderness: There is no abdominal tenderness.      Hernia: No hernia is present.   Musculoskeletal:         General: No deformity. Normal range of motion.   Lymphadenopathy:      Cervical: No cervical adenopathy.   Skin:     General: Skin is warm and dry.   Neurological:      Mental Status: She is alert and oriented to person, place, and time.      Deep Tendon Reflexes: Reflexes are normal and symmetric.   Psychiatric:         Behavior: Behavior normal.         Thought Content: Thought content normal.         Judgment: Judgment normal.       Results for orders placed or performed in visit on 09/21/22   Comprehensive Metabolic Panel    Specimen: Blood   Result Value Ref Range    Glucose 103 (H) 65 - 99 mg/dL    BUN 14 8 - 23 mg/dL    Creatinine 0.82 0.57 - 1.00 mg/dL    Sodium 141 136 - 145 mmol/L    Potassium 4.6 3.5 - 5.2 mmol/L    Chloride 104 98 - 107 mmol/L    CO2 22.0 22.0 - 29.0 mmol/L    Calcium 9.9 8.6 - 10.5 mg/dL    Total Protein 7.0 6.0 - 8.5 g/dL    Albumin 4.80 3.50 - 5.20 g/dL    ALT (SGPT) 19 1 - 33 U/L    AST (SGOT) 23 1 - 32 U/L    Alkaline Phosphatase 51 39 - 117 U/L    Total Bilirubin 0.5 0.0 - 1.2 mg/dL    Globulin 2.2 gm/dL    A/G Ratio 2.2 g/dL    BUN/Creatinine Ratio 17.1 7.0 - 25.0    Anion Gap 15.0 5.0 - 15.0 mmol/L    eGFR 81.0 >60.0 mL/min/1.73   Hemoglobin A1c    Specimen: Blood   Result Value Ref Range     Hemoglobin A1C 5.60 4.80 - 5.60 %   Lipid Panel    Specimen: Blood   Result Value Ref Range    Total Cholesterol 178 0 - 200 mg/dL    Triglycerides 75 0 - 150 mg/dL    HDL Cholesterol 47 40 - 60 mg/dL    LDL Cholesterol  117 (H) 0 - 100 mg/dL    VLDL Cholesterol 14 5 - 40 mg/dL    LDL/HDL Ratio 2.47    Iron    Specimen: Blood   Result Value Ref Range    Iron 78 37 - 145 mcg/dL   Vitamin D 25 Hydroxy    Specimen: Blood   Result Value Ref Range    25 Hydroxy, Vitamin D 54.9 30.0 - 100.0 ng/ml   Vitamin B12    Specimen: Blood   Result Value Ref Range    Vitamin B-12 632 211 - 946 pg/mL   TSH    Specimen: Blood   Result Value Ref Range    TSH 2.600 0.270 - 4.200 uIU/mL   Magnesium    Specimen: Blood   Result Value Ref Range    Magnesium 2.3 1.6 - 2.4 mg/dL   CBC Auto Differential    Specimen: Blood   Result Value Ref Range    WBC 3.98 3.40 - 10.80 10*3/mm3    RBC 4.98 3.77 - 5.28 10*6/mm3    Hemoglobin 15.0 12.0 - 15.9 g/dL    Hematocrit 43.1 34.0 - 46.6 %    MCV 86.5 79.0 - 97.0 fL    MCH 30.1 26.6 - 33.0 pg    MCHC 34.8 31.5 - 35.7 g/dL    RDW 12.5 12.3 - 15.4 %    RDW-SD 39.3 37.0 - 54.0 fl    MPV 10.2 6.0 - 12.0 fL    Platelets 277 140 - 450 10*3/mm3    Neutrophil % 58.3 42.7 - 76.0 %    Lymphocyte % 30.9 19.6 - 45.3 %    Monocyte % 7.5 5.0 - 12.0 %    Eosinophil % 2.0 0.3 - 6.2 %    Basophil % 1.0 0.0 - 1.5 %    Immature Grans % 0.3 0.0 - 0.5 %    Neutrophils, Absolute 2.32 1.70 - 7.00 10*3/mm3    Lymphocytes, Absolute 1.23 0.70 - 3.10 10*3/mm3    Monocytes, Absolute 0.30 0.10 - 0.90 10*3/mm3    Eosinophils, Absolute 0.08 0.00 - 0.40 10*3/mm3    Basophils, Absolute 0.04 0.00 - 0.20 10*3/mm3    Immature Grans, Absolute 0.01 0.00 - 0.05 10*3/mm3    nRBC 0.0 0.0 - 0.2 /100 WBC      Notes brought forward are reviewed and updated if indicated.     Assessment & Plan   Problems Addressed this Visit        Cardiac and Vasculature    Hypercholesterolemia    Relevant Orders    Ambulatory Referral to Nutrition Services       Other Visit Diagnoses     Annual physical exam    -  Primary    Relevant Orders    CBC & Differential    Comprehensive Metabolic Panel    Lipid Panel    Hemoglobin A1c    Urinalysis With Culture If Indicated - Urine, Clean Catch    Hyperglycemia        Relevant Orders    Ambulatory Referral to Nutrition Services    Yeast infection        Relevant Medications    fluconazole (Diflucan) 150 MG tablet    Sinusitis, unspecified chronicity, unspecified location        Relevant Medications    cefprozil (CEFZIL) 500 MG tablet      Diagnoses       Codes Comments    Annual physical exam    -  Primary ICD-10-CM: Z00.00  ICD-9-CM: V70.0     Hyperglycemia     ICD-10-CM: R73.9  ICD-9-CM: 790.29     Hypercholesterolemia     ICD-10-CM: E78.00  ICD-9-CM: 272.0     Yeast infection     ICD-10-CM: B37.9  ICD-9-CM: 112.9     Sinusitis, unspecified chronicity, unspecified location     ICD-10-CM: J32.9  ICD-9-CM: 473.9           Will notify regarding results. Continue current medications. Recheck if not improving.    Age-appropriate counseling is provided.     New Medications Ordered This Visit   Medications   • fluconazole (Diflucan) 150 MG tablet     Sig: Take 1 tablet by mouth 1 (One) Time for 1 dose. May repeat in 1 week if needed.     Dispense:  2 tablet     Refill:  0   • cefprozil (CEFZIL) 500 MG tablet     Sig: Take 1 tablet by mouth 2 (Two) Times a Day.     Dispense:  14 tablet     Refill:  0     Return in about 1 year (around 9/27/2023) for Annual physical.        This document has been electronically signed by Cathleen Lucio MD on September 27, 2022 10:07 CDT

## 2022-09-29 ENCOUNTER — TELEPHONE (OUTPATIENT)
Dept: FAMILY MEDICINE CLINIC | Facility: CLINIC | Age: 62
End: 2022-09-29

## 2022-09-29 DIAGNOSIS — Z12.31 ENCOUNTER FOR SCREENING MAMMOGRAM FOR MALIGNANT NEOPLASM OF BREAST: Primary | ICD-10-CM

## 2022-09-29 NOTE — TELEPHONE ENCOUNTER
Per Dr. Lucio, Ms. Larios has been called with recent DEXA Bone Density Scan results & recommendations.  Continue current medications and follow-up as planned or sooner if any problems.       ----- Message from Cathleen Lucio MD sent at 9/27/2022  6:47 PM CDT -----  Call and tell bone density shows some bone loss but not osteoporosis, make sure she is taking Ca + D 600mg daily and exercising regularly

## 2022-09-29 NOTE — TELEPHONE ENCOUNTER
Per Dr. Lucio,  has been called with recent Screening Mammogram results & recommendations.  Continue current medications and follow-up as planned or sooner if any problems.       ----- Message from Cathleen Lucio MD sent at 9/29/2022 10:02 AM CDT -----  Card normal

## 2022-10-28 ENCOUNTER — OFFICE VISIT (OUTPATIENT)
Dept: FAMILY MEDICINE CLINIC | Facility: CLINIC | Age: 62
End: 2022-10-28

## 2022-10-28 VITALS
BODY MASS INDEX: 25.64 KG/M2 | WEIGHT: 150.2 LBS | HEIGHT: 64 IN | RESPIRATION RATE: 18 BRPM | DIASTOLIC BLOOD PRESSURE: 82 MMHG | SYSTOLIC BLOOD PRESSURE: 130 MMHG | OXYGEN SATURATION: 99 % | HEART RATE: 68 BPM

## 2022-10-28 DIAGNOSIS — Z23 NEED FOR IMMUNIZATION AGAINST INFLUENZA: ICD-10-CM

## 2022-10-28 DIAGNOSIS — N89.8 VAGINAL ITCHING: Primary | ICD-10-CM

## 2022-10-28 LAB
CANDIDA ALBICANS: NEGATIVE
GARDNERELLA VAGINALIS: POSITIVE
T VAGINALIS DNA VAG QL PROBE+SIG AMP: NEGATIVE

## 2022-10-28 PROCEDURE — 90686 IIV4 VACC NO PRSV 0.5 ML IM: CPT | Performed by: GENERAL PRACTICE

## 2022-10-28 PROCEDURE — 99213 OFFICE O/P EST LOW 20 MIN: CPT | Performed by: GENERAL PRACTICE

## 2022-10-28 PROCEDURE — 90471 IMMUNIZATION ADMIN: CPT | Performed by: GENERAL PRACTICE

## 2022-10-28 PROCEDURE — 87480 CANDIDA DNA DIR PROBE: CPT | Performed by: GENERAL PRACTICE

## 2022-10-28 PROCEDURE — 87660 TRICHOMONAS VAGIN DIR PROBE: CPT | Performed by: GENERAL PRACTICE

## 2022-10-28 PROCEDURE — 87510 GARDNER VAG DNA DIR PROBE: CPT | Performed by: GENERAL PRACTICE

## 2022-10-28 RX ORDER — FLUTICASONE PROPIONATE 50 MCG
2 SPRAY, SUSPENSION (ML) NASAL DAILY
Qty: 16 G | Refills: 5 | Status: SHIPPED | OUTPATIENT
Start: 2022-10-28

## 2022-10-28 RX ORDER — METRONIDAZOLE 500 MG/1
500 TABLET ORAL 2 TIMES DAILY
Qty: 14 TABLET | Refills: 0 | OUTPATIENT
Start: 2022-10-28 | End: 2022-11-10

## 2022-10-28 RX ORDER — NYSTATIN 100000 U/G
1 CREAM TOPICAL 2 TIMES DAILY
Qty: 30 G | Refills: 0 | Status: SHIPPED | OUTPATIENT
Start: 2022-10-28

## 2022-10-28 NOTE — PROGRESS NOTES
"Subjective   Belen Larios is a 62 y.o. female.   Chief Complaint   Patient presents with   • Vaginal Itching       Vaginal Itching  The patient's primary symptoms include genital itching. The patient's pertinent negatives include no vaginal discharge. This is a chronic problem. The current episode started more than 1 month ago. The problem occurs constantly. The problem has been unchanged. The patient is experiencing no pain. Treatments tried: Diflucan. The treatment provided no relief. She is postmenopausal.      The following portions of the patient's history were reviewed and updated as appropriate: allergies, current medications, past social history and problem list.    Outpatient Medications Prior to Visit   Medication Sig Dispense Refill   • Calcium Carb-Cholecalciferol (CALCIUM CARBONATE-VITAMIN D3) 600-400 MG-UNIT tablet Take 600 mg by mouth daily.     • cetirizine (zyrTEC) 10 MG tablet Take 1 tablet by mouth daily. 30 tablet 0   • lovastatin (MEVACOR) 40 MG tablet Take 1 tablet by mouth Every Night. 90 tablet 3   • omeprazole (priLOSEC) 40 MG capsule Take 1 capsule by mouth Daily. 90 capsule 1   • cefprozil (CEFZIL) 500 MG tablet Take 1 tablet by mouth 2 (Two) Times a Day. 14 tablet 0     No facility-administered medications prior to visit.       Review of Systems   Genitourinary: Negative for vaginal discharge.     I have reviewed 12 systems with patient. Findings were negative except what is noted below and/or in history of present illness.     Objective   Visit Vitals  /82   Pulse 68   Resp 18   Ht 162.6 cm (64\")   Wt 68.1 kg (150 lb 3.2 oz)   LMP  (LMP Unknown)   SpO2 99%   BMI 25.78 kg/m²     Physical Exam  Vitals reviewed.   Constitutional:       Appearance: She is well-developed.   HENT:      Head: Normocephalic and atraumatic.   Eyes:      Conjunctiva/sclera: Conjunctivae normal.      Pupils: Pupils are equal, round, and reactive to light.   Pulmonary:      Effort: Pulmonary effort is " normal.   Genitourinary:     Labia:         Right: Rash (irritation) present.         Left: Rash (irritation) present.    Neurological:      Mental Status: She is alert and oriented to person, place, and time.       Notes brought forward are reviewed and updated if indicated.     Assessment & Plan   Problems Addressed this Visit    None  Visit Diagnoses     Vaginal itching    -  Primary    Relevant Medications    nystatin (MYCOSTATIN) 597567 UNIT/GM cream    Other Relevant Orders    Gardnerella vaginalis, Trichomonas vaginalis, Candida albicans, DNA - Swab, Vagina      Diagnoses       Codes Comments    Vaginal itching    -  Primary ICD-10-CM: N89.8  ICD-9-CM: 698.1           Will notify regarding results. Try nystatin cream topically. Swab came back positive for bact vag., started on flagyl.  \    New Medications Ordered This Visit   Medications   • nystatin (MYCOSTATIN) 964069 UNIT/GM cream     Sig: Apply 1 application topically to the appropriate area as directed 2 (Two) Times a Day.     Dispense:  30 g     Refill:  0   • fluticasone (Flonase) 50 MCG/ACT nasal spray     Si sprays into the nostril(s) as directed by provider Daily.     Dispense:  18.2 mL     Refill:  5     No follow-ups on file.        This document has been electronically signed by Cathleen Lucio MD on 2022 10:08 CDT

## 2022-11-07 ENCOUNTER — HOSPITAL ENCOUNTER (OUTPATIENT)
Dept: NUTRITION | Facility: HOSPITAL | Age: 62
Discharge: HOME | End: 2022-11-07
Admitting: DIETITIAN, REGISTERED

## 2022-11-07 PROCEDURE — 97802 MEDICAL NUTRITION INDIV IN: CPT | Performed by: DIETITIAN, REGISTERED

## 2022-11-07 NOTE — PROGRESS NOTES
Adult Outpatient Nutrition  Assessment/PES    Patient Name:  Belen Larios  YOB: 1960  MRN: [id number]    Assessment Date:  11/7/2022    Comments:  Pt was referred by Dr. Itzel Lucio for hyperglycemia and hypercholesterolemia. Pt had many good questions and took notes to use at home. We discussed carb counting and llimiting carbs to 30-45 grams per meal to prevent  An increase in her A1C levels. She said she has a strong family history of diabetes and she wants to avoid the diagnosis of diabetes. The patient's questions were answered to her satisfaction and pt was appreciative of the discussion today. Pt will call with questions or if another appt is needed in  the future. She will have repeat blood work in one year. This RDN will follow as needed.     General Info     Row Name 11/07/22 1232       Today's Session    Person(s) attending today's session Patient       General Information    People in Home spouse               Physical Findings     Row Name 11/07/22 1232          Physical Findings    Overall Physical Appearance bmi within normal limits                Retired Nutritional Info/Activity     Row Name 11/07/22 1233          Eating Environment    Eating environment Family        Physical Activity    Are you currently involved in an activity/exercise program?  Yes     Describe physical activity walks for exercise 2 miles per day                Home Nutrition Report     Row Name 11/07/22 1233          Home Nutrition Report    Typical Intake (Food/Fluid/EN/PN) 3 meals  per day, snacks                Estimated/Assessed Needs - Anthropometrics     Row Name 11/07/22 1238          Estimated/Assessed Needs    Additional Documentation Estimated Calorie Needs (Group)        Estimated Calorie Needs    Estimated Calorie Requirement (kcal/day) 1500 for weight maintenance.                Labs/Tests/Procedures/Meds     Row Name 11/07/22 1239          Labs/Procedures/Meds    Lab Results Reviewed  reviewed, pertinent        Medications    Pertinent Medications Reviewed reviewed, pertinent                   Problem/Interventions:   Problem 1     Row Name 11/07/22 1240          Nutrition Diagnoses Problem 1    Problem 1 Knowledge Deficit     Etiology (related to) Medical Diagnosis     Endocrine Hyperglycemia     Signs/Symptoms (evidenced by) Demonstrated Information Deficit                        Intervention Goal     Row Name 11/07/22 1240          Intervention Goal    General Provide information regarding MNT for treatment/condition                    Education/Evaluation     Row Name 11/07/22 1240          Education    Education Provided education regarding;Education topics     Education Topics Basic nutrition;CHO counting;Diabetes                 Electronically signed by:  April Mcgee RD  11/07/22 12:41 CST

## 2022-11-10 PROCEDURE — 87480 CANDIDA DNA DIR PROBE: CPT | Performed by: NURSE PRACTITIONER

## 2022-11-10 PROCEDURE — 87510 GARDNER VAG DNA DIR PROBE: CPT | Performed by: NURSE PRACTITIONER

## 2022-11-10 PROCEDURE — 87660 TRICHOMONAS VAGIN DIR PROBE: CPT | Performed by: NURSE PRACTITIONER

## 2022-11-11 DIAGNOSIS — J32.8 OTHER CHRONIC SINUSITIS: Primary | ICD-10-CM

## 2022-11-11 DIAGNOSIS — R43.9 DYSOSMIA: ICD-10-CM

## 2022-12-07 ENCOUNTER — OFFICE VISIT (OUTPATIENT)
Dept: FAMILY MEDICINE CLINIC | Facility: CLINIC | Age: 62
End: 2022-12-07

## 2022-12-07 VITALS
DIASTOLIC BLOOD PRESSURE: 84 MMHG | BODY MASS INDEX: 26.4 KG/M2 | RESPIRATION RATE: 18 BRPM | SYSTOLIC BLOOD PRESSURE: 132 MMHG | OXYGEN SATURATION: 99 % | HEIGHT: 64 IN | HEART RATE: 72 BPM

## 2022-12-07 DIAGNOSIS — J32.0 CHRONIC MAXILLARY SINUSITIS: Primary | ICD-10-CM

## 2022-12-07 DIAGNOSIS — R43.9 DYSOSMIA: ICD-10-CM

## 2022-12-07 DIAGNOSIS — R43.8 BAD TASTE IN MOUTH: ICD-10-CM

## 2022-12-07 DIAGNOSIS — B37.9 YEAST INFECTION: ICD-10-CM

## 2022-12-07 PROCEDURE — 99213 OFFICE O/P EST LOW 20 MIN: CPT | Performed by: GENERAL PRACTICE

## 2022-12-07 RX ORDER — FLUCONAZOLE 150 MG/1
150 TABLET ORAL ONCE
Qty: 2 TABLET | Refills: 0 | Status: SHIPPED | OUTPATIENT
Start: 2022-12-07 | End: 2022-12-08

## 2022-12-07 NOTE — PROGRESS NOTES
Subjective   Belen Larios is a 62 y.o. female.   Chief Complaint   Patient presents with   • Sinusitis     Has been having trouble with sinuses for the last 3 months or so.  Has been having a lot of sinus congestion and drainage which is causing a bad taste in her mouth.  She is also not able to smell things normally, they seem to have a bad smell.  She has had some x-rays discomfort.  Has been on 3 different antibiotics which have not been helpful.  She also uses Flonase on a daily basis antihistamine.  Sinusitis  This is a chronic problem. The current episode started more than 1 month ago. The problem has been gradually worsening since onset. There has been no fever. Her pain is at a severity of 5/10. The pain is moderate. Associated symptoms include congestion, coughing, headaches and sinus pressure. Pertinent negatives include no chills. (Bad taste in mouth) Past treatments include antibiotics (flonase, ). The treatment provided mild relief.      The following portions of the patient's history were reviewed and updated as appropriate: allergies, current medications, past social history and problem list.    Outpatient Medications Prior to Visit   Medication Sig Dispense Refill   • Calcium Carb-Cholecalciferol (CALCIUM CARBONATE-VITAMIN D3) 600-400 MG-UNIT tablet Take 600 mg by mouth daily.     • cetirizine (zyrTEC) 10 MG tablet Take 1 tablet by mouth daily. 30 tablet 0   • fluticasone (Flonase) 50 MCG/ACT nasal spray Instill 2 sprays into the nostril(s) as directed by provider Daily. 16 g 5   • lovastatin (MEVACOR) 40 MG tablet Take 1 tablet by mouth Every Night. 90 tablet 3   • nystatin (MYCOSTATIN) 556153 UNIT/GM cream Apply 1 application topically to the appropriate area as directed 2 (Two) Times a Day. 30 g 0   • omeprazole (priLOSEC) 40 MG capsule Take 1 capsule by mouth Daily. 90 capsule 1     No facility-administered medications prior to visit.       Review of Systems   Constitutional: Negative for  "chills.   HENT: Positive for congestion and sinus pressure.    Respiratory: Positive for cough.    Neurological: Positive for headaches.     I have reviewed 12 systems with patient. Findings were negative except what is noted below and/or in history of present illness.     Objective   Visit Vitals  /84   Pulse 72   Resp 18   Ht 162.6 cm (64\")   LMP  (LMP Unknown)   SpO2 99%   BMI 26.40 kg/m²     Physical Exam  Vitals and nursing note reviewed.   Constitutional:       General: She is not in acute distress.     Appearance: She is well-developed.   HENT:      Head: Normocephalic and atraumatic.      Nose: Mucosal edema and congestion present.      Right Sinus: Maxillary sinus tenderness present.      Left Sinus: Maxillary sinus tenderness present.   Eyes:      General:         Right eye: No discharge.         Left eye: No discharge.      Conjunctiva/sclera: Conjunctivae normal.      Pupils: Pupils are equal, round, and reactive to light.   Neck:      Thyroid: No thyromegaly.   Cardiovascular:      Rate and Rhythm: Normal rate and regular rhythm.      Heart sounds: Normal heart sounds.   Pulmonary:      Effort: Pulmonary effort is normal.      Breath sounds: Normal breath sounds.   Lymphadenopathy:      Cervical: No cervical adenopathy.   Skin:     General: Skin is warm and dry.   Neurological:      Mental Status: She is alert and oriented to person, place, and time.       Notes brought forward are reviewed and updated if indicated.     Assessment & Plan   Problems Addressed this Visit    None  Visit Diagnoses     Chronic maxillary sinusitis    -  Primary    Relevant Orders    CT Sinus Without Contrast    Yeast infection        Relevant Medications    fluconazole (Diflucan) 150 MG tablet    Dysosmia        Relevant Orders    CT Sinus Without Contrast    Bad taste in mouth        Relevant Orders    CT Sinus Without Contrast      Diagnoses       Codes Comments    Chronic maxillary sinusitis    -  Primary ICD-10-CM: " J32.0  ICD-9-CM: 473.0     Yeast infection     ICD-10-CM: B37.9  ICD-9-CM: 112.9     Dysosmia     ICD-10-CM: R43.9  ICD-9-CM: 781.1     Bad taste in mouth     ICD-10-CM: R43.8  ICD-9-CM: 781.1          More antibiotics are not indicated.  Need to do CT scan to rule out chronic sinus pathology.  Continue Flonase and antihistamine for now.  Also, prescription for Diflucan sent.    New Medications Ordered This Visit   Medications   • fluconazole (Diflucan) 150 MG tablet     Sig: Take 1 tablet by mouth 1 (One) Time for 1 dose. May repeat in 1 week if needed.     Dispense:  2 tablet     Refill:  0     No follow-ups on file.        This document has been electronically signed by Cathleen Lucio MD on December 7, 2022 09:31 CST

## 2022-12-12 ENCOUNTER — HOSPITAL ENCOUNTER (OUTPATIENT)
Dept: CT IMAGING | Facility: HOSPITAL | Age: 62
Discharge: HOME | End: 2022-12-12
Admitting: GENERAL PRACTICE

## 2022-12-12 DIAGNOSIS — R43.9 DYSOSMIA: ICD-10-CM

## 2022-12-12 DIAGNOSIS — J32.0 CHRONIC MAXILLARY SINUSITIS: ICD-10-CM

## 2022-12-12 DIAGNOSIS — R43.8 BAD TASTE IN MOUTH: ICD-10-CM

## 2022-12-12 PROCEDURE — 70486 CT MAXILLOFACIAL W/O DYE: CPT

## 2023-01-20 ENCOUNTER — OFFICE VISIT (OUTPATIENT)
Dept: FAMILY MEDICINE CLINIC | Facility: CLINIC | Age: 63
End: 2023-01-20
Payer: COMMERCIAL

## 2023-01-20 VITALS
SYSTOLIC BLOOD PRESSURE: 128 MMHG | DIASTOLIC BLOOD PRESSURE: 74 MMHG | OXYGEN SATURATION: 99 % | RESPIRATION RATE: 18 BRPM | HEART RATE: 73 BPM | BODY MASS INDEX: 26.7 KG/M2 | HEIGHT: 64 IN | WEIGHT: 156.4 LBS

## 2023-01-20 DIAGNOSIS — F32.9 REACTIVE DEPRESSION: Primary | ICD-10-CM

## 2023-01-20 PROCEDURE — 99213 OFFICE O/P EST LOW 20 MIN: CPT | Performed by: GENERAL PRACTICE

## 2023-01-20 RX ORDER — ESCITALOPRAM OXALATE 10 MG/1
10 TABLET ORAL DAILY
Qty: 30 TABLET | Refills: 0 | Status: SHIPPED | OUTPATIENT
Start: 2023-01-20 | End: 2023-02-21 | Stop reason: SDUPTHER

## 2023-01-20 NOTE — PROGRESS NOTES
Subjective   Belen Larios is a 62 y.o. female.   Chief Complaint   Patient presents with   • Anxiety   • Depression     History of Present Illness     Mother passed away 8 months ago.  Is not getting approval for this.  She cries all the time and goes to her mom's house, sits in her chair and smells are close.  Her mother lives next door and they were very close.  Holidays were difficult.  She just does not feel that she is getting over the loss.    The following portions of the patient's history were reviewed and updated as appropriate: allergies, current medications, past social history and problem list.    Outpatient Medications Prior to Visit   Medication Sig Dispense Refill   • Calcium Carb-Cholecalciferol (CALCIUM CARBONATE-VITAMIN D3) 600-400 MG-UNIT tablet Take 600 mg by mouth daily.     • clotrimazole-betamethasone (Lotrisone) 1-0.05 % cream Apply to left ear twice a day for 7 to 10 days as needed for itching. (Patient taking differently: Apply to left ear twice a day for 7 to 10 days as needed for itching.) 45 g 2   • fluticasone (Flonase) 50 MCG/ACT nasal spray Instill 2 sprays into the nostril(s) as directed by provider Daily. 16 g 5   • lovastatin (MEVACOR) 40 MG tablet Take 1 tablet by mouth Every Night. 90 tablet 3   • nystatin (MYCOSTATIN) 591330 UNIT/GM cream Apply 1 application topically to the appropriate area as directed 2 (Two) Times a Day. 30 g 0   • omeprazole (priLOSEC) 40 MG capsule Take 1 capsule by mouth Daily. 90 capsule 1   • cetirizine (zyrTEC) 10 MG tablet Take 1 tablet by mouth daily. 30 tablet 0     No facility-administered medications prior to visit.   Answers for HPI/ROS submitted by the patient on 1/18/2023  Please describe your symptoms.: Coping with my mother’s recent passing  Have you had these symptoms before?: No  How long have you been having these symptoms?: Greater than 2 weeks  What is the primary reason for your visit?: Other        Review of Systems  I have  "reviewed 12 systems with patient. Findings were negative except what is noted below and/or in history of present illness.     Objective   Visit Vitals  /74   Pulse 73   Resp 18   Ht 162.6 cm (64\")   Wt 70.9 kg (156 lb 6.4 oz)   LMP  (LMP Unknown)   SpO2 99%   BMI 26.85 kg/m²     Physical Exam  Vitals reviewed.   Constitutional:       Appearance: She is well-developed.   HENT:      Head: Normocephalic and atraumatic.   Eyes:      Conjunctiva/sclera: Conjunctivae normal.      Pupils: Pupils are equal, round, and reactive to light.   Pulmonary:      Effort: Pulmonary effort is normal.   Neurological:      Mental Status: She is alert and oriented to person, place, and time.   Psychiatric:         Mood and Affect: Mood is depressed. Affect is tearful.         Notes brought forward are reviewed and updated if indicated.     Assessment & Plan   Problems Addressed this Visit    None  Visit Diagnoses     Reactive depression    -  Primary    Relevant Medications    escitalopram (Lexapro) 10 MG tablet      Diagnoses       Codes Comments    Reactive depression    -  Primary ICD-10-CM: F32.9  ICD-9-CM: 300.4          Start escitalopram.Instructions given regarding proper use and potential risks and side effects. Advised to recheck if is having any issues with this medication.  Encouraged her to consider counseling but she would like to wait and see if the medication is helpful.    New Medications Ordered This Visit   Medications   • escitalopram (Lexapro) 10 MG tablet     Sig: Take 1 tablet by mouth Daily.     Dispense:  30 tablet     Refill:  0     Return in about 4 weeks (around 2/17/2023) for Recheck.        This document has been electronically signed by Cathleen Lucio MD on January 20, 2023 08:48 CST    "

## 2023-02-21 ENCOUNTER — OFFICE VISIT (OUTPATIENT)
Dept: FAMILY MEDICINE CLINIC | Facility: CLINIC | Age: 63
End: 2023-02-21
Payer: COMMERCIAL

## 2023-02-21 VITALS
HEART RATE: 63 BPM | BODY MASS INDEX: 27.26 KG/M2 | OXYGEN SATURATION: 98 % | DIASTOLIC BLOOD PRESSURE: 70 MMHG | SYSTOLIC BLOOD PRESSURE: 130 MMHG | WEIGHT: 159.7 LBS | HEIGHT: 64 IN

## 2023-02-21 DIAGNOSIS — F32.9 REACTIVE DEPRESSION: ICD-10-CM

## 2023-02-21 PROCEDURE — 99213 OFFICE O/P EST LOW 20 MIN: CPT | Performed by: GENERAL PRACTICE

## 2023-02-21 RX ORDER — ESCITALOPRAM OXALATE 10 MG/1
10 TABLET ORAL DAILY
Qty: 90 TABLET | Refills: 1 | Status: SHIPPED | OUTPATIENT
Start: 2023-02-21

## 2023-02-21 NOTE — PROGRESS NOTES
"Subjective   Belen Larios is a 62 y.o. female.   Chief Complaint   Patient presents with   • Depression     History of Present Illness     For review and evaluation of management of chronic medical problems. Records reviewed. Any recent labs, xrays reviewed and medications reconciled. Mother passed away 9 months ago. She was crying all the time and going to her mom's house, sitting in her chair and smelling are close.  Her mother lived next door and they were very close.  Holidays were difficult.  Start on escitalopram and is feeling better.     The following portions of the patient's history were reviewed and updated as appropriate: allergies, current medications, past social history and problem list.    Outpatient Medications Prior to Visit   Medication Sig Dispense Refill   • Calcium Carb-Cholecalciferol (CALCIUM CARBONATE-VITAMIN D3) 600-400 MG-UNIT tablet Take 600 mg by mouth daily.     • fluticasone (FLONASE) 50 MCG/ACT nasal spray Instill 2 sprays into the nostril(s) as directed by provider Daily. 16 g 5   • lovastatin (MEVACOR) 40 MG tablet Take 1 tablet by mouth Every Night. 90 tablet 3   • nystatin (MYCOSTATIN) 350926 UNIT/GM cream Apply 1 application topically to the appropriate area as directed 2 (Two) Times a Day. 30 g 0   • omeprazole (priLOSEC) 40 MG capsule Take 1 capsule by mouth Daily. 90 capsule 1   • escitalopram (Lexapro) 10 MG tablet Take 1 tablet by mouth Daily. 30 tablet 0   • clotrimazole-betamethasone (Lotrisone) 1-0.05 % cream Apply to left ear twice a day for 7 to 10 days as needed for itching. (Patient taking differently: Apply to left ear twice a day for 7 to 10 days as needed for itching.) 45 g 2     No facility-administered medications prior to visit.       Review of Systems  I have reviewed 12 systems with patient. Findings were negative except what is noted below and/or in history of present illness.     Objective   Visit Vitals  /70   Pulse 63   Ht 162.6 cm (64\")   Wt " 72.4 kg (159 lb 11.2 oz)   LMP  (LMP Unknown)   SpO2 98%   BMI 27.41 kg/m²     Physical Exam  Vitals reviewed.   Constitutional:       Appearance: She is well-developed.   HENT:      Head: Normocephalic and atraumatic.   Eyes:      Conjunctiva/sclera: Conjunctivae normal.      Pupils: Pupils are equal, round, and reactive to light.   Pulmonary:      Effort: Pulmonary effort is normal.   Neurological:      Mental Status: She is alert and oriented to person, place, and time.       Notes brought forward are reviewed and updated if indicated.     Assessment & Plan   Problems Addressed this Visit    None  Visit Diagnoses     Reactive depression        Relevant Medications    escitalopram (Lexapro) 10 MG tablet      Diagnoses       Codes Comments    Reactive depression     ICD-10-CM: F32.9  ICD-9-CM: 300.4           Continue current medications.     New Medications Ordered This Visit   Medications   • escitalopram (Lexapro) 10 MG tablet     Sig: Take 1 tablet by mouth Daily.     Dispense:  90 tablet     Refill:  1     No follow-ups on file.        This document has been electronically signed by Cathleen Lucio MD on February 21, 2023 10:39 CST    Answers for HPI/ROS submitted by the patient on 2/20/2023  Please describe your symptoms.: F/U appt from 1 month ago  Have you had these symptoms before?: Yes  How long have you been having these symptoms?: Greater than 2 weeks  Please list any medications you are currently taking for this condition.: Lexapro  What is the primary reason for your visit?: Other

## 2023-03-13 DIAGNOSIS — K21.9 GERD WITHOUT ESOPHAGITIS: ICD-10-CM

## 2023-03-13 RX ORDER — OMEPRAZOLE 40 MG/1
40 CAPSULE, DELAYED RELEASE ORAL DAILY
Qty: 90 CAPSULE | Refills: 1 | Status: SHIPPED | OUTPATIENT
Start: 2023-03-13

## 2023-08-13 DIAGNOSIS — F32.9 REACTIVE DEPRESSION: ICD-10-CM

## 2023-08-14 RX ORDER — ESCITALOPRAM OXALATE 10 MG/1
10 TABLET ORAL DAILY
Qty: 90 TABLET | Refills: 1 | Status: SHIPPED | OUTPATIENT
Start: 2023-08-14

## 2023-08-14 NOTE — TELEPHONE ENCOUNTER
UPCOMING APPTS  With Family Medicine (Cathleen Lucio MD)  10/03/2023 at 9:00 AM  LAST OFFICE VISIT - THIS DEPT  2/21/2023 Cathleen Lucio MD

## 2023-08-30 ENCOUNTER — OFFICE VISIT (OUTPATIENT)
Dept: FAMILY MEDICINE CLINIC | Facility: CLINIC | Age: 63
End: 2023-08-30
Payer: COMMERCIAL

## 2023-08-30 VITALS
OXYGEN SATURATION: 98 % | HEIGHT: 64 IN | DIASTOLIC BLOOD PRESSURE: 88 MMHG | BODY MASS INDEX: 26.79 KG/M2 | WEIGHT: 156.9 LBS | RESPIRATION RATE: 16 BRPM | HEART RATE: 69 BPM | SYSTOLIC BLOOD PRESSURE: 132 MMHG

## 2023-08-30 DIAGNOSIS — M72.2 PLANTAR FASCIITIS, BILATERAL: Primary | ICD-10-CM

## 2023-08-30 PROCEDURE — 99213 OFFICE O/P EST LOW 20 MIN: CPT | Performed by: GENERAL PRACTICE

## 2023-08-30 NOTE — PROGRESS NOTES
"Subjective   Belen Larios is a 62 y.o. female.   Chief Complaint   Patient presents with    plantar fascitis     History of Present Illness     Is having pain in both heels right > left. Pain is worse when first gets out of bed, improves a bit but flares up whenever she rests. Has started wearing better shoes and using ice and nsaids.     The following portions of the patient's history were reviewed and updated as appropriate: allergies, current medications, past social history and problem list.    Outpatient Medications Prior to Visit   Medication Sig Dispense Refill    Calcium Carb-Cholecalciferol (CALCIUM CARBONATE-VITAMIN D3) 600-400 MG-UNIT tablet Take 600 mg by mouth daily.      escitalopram (Lexapro) 10 MG tablet Take 1 tablet by mouth Daily. 90 tablet 1    lovastatin (MEVACOR) 40 MG tablet Take 1 tablet by mouth Every Night. 90 tablet 3    omeprazole (priLOSEC) 40 MG capsule Take 1 capsule by mouth Daily. 90 capsule 1    fluticasone (FLONASE) 50 MCG/ACT nasal spray Instill 2 sprays into the nostril(s) as directed by provider Daily. (Patient not taking: Reported on 8/30/2023) 16 g 5    nystatin (MYCOSTATIN) 672091 UNIT/GM cream Apply 1 application topically to the appropriate area as directed 2 (Two) Times a Day. (Patient not taking: Reported on 8/30/2023) 30 g 0     No facility-administered medications prior to visit.       Review of Systems  I have reviewed 12 systems with patient. Findings were negative except what is noted below and/or in history of present illness.     Objective   Visit Vitals  /88 (BP Location: Left arm, Patient Position: Sitting, Cuff Size: Adult)   Pulse 69   Resp 16   Ht 162.6 cm (64\")   Wt 71.2 kg (156 lb 14.4 oz)   LMP  (LMP Unknown)   SpO2 98%   BMI 26.93 kg/mý     Physical Exam  Vitals reviewed.   Constitutional:       Appearance: She is well-developed.   HENT:      Head: Normocephalic and atraumatic.   Eyes:      Conjunctiva/sclera: Conjunctivae normal.      Pupils: " Pupils are equal, round, and reactive to light.   Pulmonary:      Effort: Pulmonary effort is normal.   Musculoskeletal:        Feet:    Neurological:      Mental Status: She is alert and oriented to person, place, and time.       Notes brought forward are reviewed and updated if indicated.     Assessment & Plan   Problems Addressed this Visit    None  Visit Diagnoses       Plantar fasciitis, bilateral    -  Primary          Diagnoses         Codes Comments    Plantar fasciitis, bilateral    -  Primary ICD-10-CM: M72.2  ICD-9-CM: 728.71            Rest, ice, NSAIDs.  Rehab exercises given. Recheck if not improving.   May benefit from actual physical therapy, custom insole or referral to podiatry.    No orders of the defined types were placed in this encounter.    Return if symptoms worsen or fail to improve, for Next scheduled follow up.        This document has been electronically signed by Cathleen Lucio MD on August 30, 2023 10:26 CDT  Answers submitted by the patient for this visit:  Other (Submitted on 8/29/2023)  Please describe your symptoms.: Plantar fasciitis  Have you had these symptoms before?: No  How long have you been having these symptoms?: Greater than 2 weeks  Please list any medications you are currently taking for this condition.: Nsaids  Primary Reason for Visit (Submitted on 8/29/2023)  What is the primary reason for your visit?: Other

## 2023-09-04 DIAGNOSIS — K21.9 GERD WITHOUT ESOPHAGITIS: ICD-10-CM

## 2023-09-05 RX ORDER — OMEPRAZOLE 40 MG/1
40 CAPSULE, DELAYED RELEASE ORAL DAILY
Qty: 90 CAPSULE | Refills: 1 | Status: SHIPPED | OUTPATIENT
Start: 2023-09-05

## 2023-09-05 RX ORDER — LOVASTATIN 40 MG/1
40 TABLET ORAL NIGHTLY
Qty: 90 TABLET | Refills: 3 | Status: SHIPPED | OUTPATIENT
Start: 2023-09-05

## 2023-09-27 ENCOUNTER — LAB (OUTPATIENT)
Dept: LAB | Facility: HOSPITAL | Age: 63
End: 2023-09-27
Payer: COMMERCIAL

## 2023-09-27 DIAGNOSIS — Z00.00 ANNUAL PHYSICAL EXAM: ICD-10-CM

## 2023-09-27 LAB
BILIRUB UR QL STRIP: NEGATIVE
CLARITY UR: CLEAR
COLOR UR: YELLOW
GLUCOSE UR STRIP-MCNC: NEGATIVE MG/DL
HGB UR QL STRIP.AUTO: NEGATIVE
KETONES UR QL STRIP: NEGATIVE
LEUKOCYTE ESTERASE UR QL STRIP.AUTO: NEGATIVE
NITRITE UR QL STRIP: NEGATIVE
PH UR STRIP.AUTO: 5.5 [PH] (ref 5–9)
PROT UR QL STRIP: NEGATIVE
SP GR UR STRIP: 1.02 (ref 1–1.03)
UROBILINOGEN UR QL STRIP: NORMAL

## 2023-09-27 PROCEDURE — 36415 COLL VENOUS BLD VENIPUNCTURE: CPT

## 2023-09-27 PROCEDURE — 83036 HEMOGLOBIN GLYCOSYLATED A1C: CPT

## 2023-09-27 PROCEDURE — 80053 COMPREHEN METABOLIC PANEL: CPT

## 2023-09-27 PROCEDURE — 85025 COMPLETE CBC W/AUTO DIFF WBC: CPT

## 2023-09-27 PROCEDURE — 81003 URINALYSIS AUTO W/O SCOPE: CPT

## 2023-09-27 PROCEDURE — 80061 LIPID PANEL: CPT

## 2023-09-28 LAB
ALBUMIN SERPL-MCNC: 5.2 G/DL (ref 3.5–5.2)
ALBUMIN/GLOB SERPL: 2.5 G/DL
ALP SERPL-CCNC: 46 U/L (ref 39–117)
ALT SERPL W P-5'-P-CCNC: 17 U/L (ref 1–33)
ANION GAP SERPL CALCULATED.3IONS-SCNC: 11 MMOL/L (ref 5–15)
AST SERPL-CCNC: 21 U/L (ref 1–32)
BASOPHILS # BLD AUTO: 0.03 10*3/MM3 (ref 0–0.2)
BASOPHILS NFR BLD AUTO: 0.8 % (ref 0–1.5)
BILIRUB SERPL-MCNC: 0.6 MG/DL (ref 0–1.2)
BUN SERPL-MCNC: 18 MG/DL (ref 8–23)
BUN/CREAT SERPL: 19.8 (ref 7–25)
CALCIUM SPEC-SCNC: 10 MG/DL (ref 8.6–10.5)
CHLORIDE SERPL-SCNC: 105 MMOL/L (ref 98–107)
CHOLEST SERPL-MCNC: 168 MG/DL (ref 0–200)
CO2 SERPL-SCNC: 23 MMOL/L (ref 22–29)
CREAT SERPL-MCNC: 0.91 MG/DL (ref 0.57–1)
DEPRECATED RDW RBC AUTO: 41.4 FL (ref 37–54)
EGFRCR SERPLBLD CKD-EPI 2021: 71 ML/MIN/1.73
EOSINOPHIL # BLD AUTO: 0.07 10*3/MM3 (ref 0–0.4)
EOSINOPHIL NFR BLD AUTO: 2 % (ref 0.3–6.2)
ERYTHROCYTE [DISTWIDTH] IN BLOOD BY AUTOMATED COUNT: 12.8 % (ref 12.3–15.4)
GLOBULIN UR ELPH-MCNC: 2.1 GM/DL
GLUCOSE SERPL-MCNC: 102 MG/DL (ref 65–99)
HBA1C MFR BLD: 6.1 % (ref 4.8–5.6)
HCT VFR BLD AUTO: 42.2 % (ref 34–46.6)
HDLC SERPL-MCNC: 45 MG/DL (ref 40–60)
HGB BLD-MCNC: 14 G/DL (ref 12–15.9)
IMM GRANULOCYTES # BLD AUTO: 0.01 10*3/MM3 (ref 0–0.05)
IMM GRANULOCYTES NFR BLD AUTO: 0.3 % (ref 0–0.5)
LDLC SERPL CALC-MCNC: 106 MG/DL (ref 0–100)
LDLC/HDLC SERPL: 2.34 {RATIO}
LYMPHOCYTES # BLD AUTO: 1.29 10*3/MM3 (ref 0.7–3.1)
LYMPHOCYTES NFR BLD AUTO: 36 % (ref 19.6–45.3)
MCH RBC QN AUTO: 29.5 PG (ref 26.6–33)
MCHC RBC AUTO-ENTMCNC: 33.2 G/DL (ref 31.5–35.7)
MCV RBC AUTO: 89 FL (ref 79–97)
MONOCYTES # BLD AUTO: 0.27 10*3/MM3 (ref 0.1–0.9)
MONOCYTES NFR BLD AUTO: 7.5 % (ref 5–12)
NEUTROPHILS NFR BLD AUTO: 1.91 10*3/MM3 (ref 1.7–7)
NEUTROPHILS NFR BLD AUTO: 53.4 % (ref 42.7–76)
NRBC BLD AUTO-RTO: 0.3 /100 WBC (ref 0–0.2)
PLATELET # BLD AUTO: 262 10*3/MM3 (ref 140–450)
PMV BLD AUTO: 10.4 FL (ref 6–12)
POTASSIUM SERPL-SCNC: 5 MMOL/L (ref 3.5–5.2)
PROT SERPL-MCNC: 7.3 G/DL (ref 6–8.5)
RBC # BLD AUTO: 4.74 10*6/MM3 (ref 3.77–5.28)
SODIUM SERPL-SCNC: 139 MMOL/L (ref 136–145)
TRIGL SERPL-MCNC: 89 MG/DL (ref 0–150)
VLDLC SERPL-MCNC: 17 MG/DL (ref 5–40)
WBC NRBC COR # BLD: 3.58 10*3/MM3 (ref 3.4–10.8)

## (undated) DEVICE — STERILE POLYISOPRENE POWDER-FREE SURGICAL GLOVES WITH EMOLLIENT COATING: Brand: PROTEXIS

## (undated) DEVICE — STCKNT IMPERV 12IN STRL

## (undated) DEVICE — 3M™ IOBAN™ 2 ANTIMICROBIAL INCISE DRAPE 6651EZ: Brand: IOBAN™ 2

## (undated) DEVICE — CVR SURG EQUIP BND RECTG 36X28

## (undated) DEVICE — TP SXN YANKR BLB TIP W/TBG 10F LF STRL

## (undated) DEVICE — SPNG LAP 18X18IN LF STRL PK/5

## (undated) DEVICE — BNDG ELAS CO-FLEX SLF ADHR 4IN5YD LF STRL

## (undated) DEVICE — Device

## (undated) DEVICE — GOWN,PREVENTION PLUS,XLONG/XLARGE,STRL: Brand: MEDLINE

## (undated) DEVICE — TP MEFIX 4IN 11YD

## (undated) DEVICE — 2.8MM X 5" QUICK RELEASE DRILL: Brand: ACUMED

## (undated) DEVICE — TRY IRR

## (undated) DEVICE — GOWN,AURORA,NOREINF,RAGLAN,XL,STERILE: Brand: MEDLINE

## (undated) DEVICE — SINGLE-USE BIOPSY FORCEPS: Brand: RADIAL JAW 4

## (undated) DEVICE — 2.0MM X 5" QUICK RELEASE DRILL: Brand: ACUMED

## (undated) DEVICE — BITEBLOCK ENDO W/STRAP 60F A/ LF DISP

## (undated) DEVICE — GAUZE,SPONGE,4"X4",16PLY,XRAY,STRL,LF: Brand: MEDLINE

## (undated) DEVICE — CONTAINER,SPECIMEN,OR STERILE,4OZ: Brand: MEDLINE

## (undated) DEVICE — CANN SMPL SOFTECH BIFLO ETCO2 A/M 7FT

## (undated) DEVICE — INTENDED FOR TISSUE SEPARATION, AND OTHER PROCEDURES THAT REQUIRE A SHARP SURGICAL BLADE TO PUNCTURE OR CUT.: Brand: BARD-PARKER ® CARBON RIB-BACK BLADES

## (undated) DEVICE — GLV SURG TRIUMPH LT PF LTX 6 STRL

## (undated) DEVICE — GLV SURG TRIUMPH LT PF LTX 8 STRL

## (undated) DEVICE — PENCL E/S HNDSWCH PUSHBTN HOLSTR 10FT

## (undated) DEVICE — DBD-PACK,SHOULDER III: Brand: MEDLINE

## (undated) DEVICE — SPNG GZ WOVN 4X4IN 12PLY 10/BX STRL

## (undated) DEVICE — GLV SURG SENSICARE POLYISPRN W/ALOE PF LF 6 GRN STRL

## (undated) DEVICE — GLV SURG TRIUMPH LT PF LTX 7 STRL

## (undated) DEVICE — LIGHT HANDLE: Brand: DEVON

## (undated) DEVICE — PRECISION THIN (9.0 X 0.38 X 31.0MM)

## (undated) DEVICE — SUT VICRYL 2-0  X-1 27IN ETVCP459H PLS

## (undated) DEVICE — NDL HYPO PRECISIONGLIDE/REG 18G 1IN PNK

## (undated) DEVICE — BNDG ELAS ELITE V/CLOSE 4IN 5YD LF STRL

## (undated) DEVICE — NDL HYPO SFTY GLD 22G 1 1/2IN

## (undated) DEVICE — STPLR SKIN VISISTAT WD 35CT

## (undated) DEVICE — DRSNG GZ CURAD XEROFORM NONADHS 5X9IN STRL

## (undated) DEVICE — TOWEL,OR,DSP,ST,BLUE,DLX,8/PK,10PK/CS: Brand: MEDLINE

## (undated) DEVICE — UNDYED BRAIDED (POLYGLACTIN 910), SYNTHETIC ABSORBABLE SUTURE: Brand: COATED VICRYL

## (undated) DEVICE — 1314 FOAM STRIP NEEDLE COUNTER: Brand: DEVON

## (undated) DEVICE — GLV SURG SENSICARE PI LF PF 7.5 GRN STRL